# Patient Record
Sex: FEMALE | Race: OTHER | Employment: UNEMPLOYED | URBAN - METROPOLITAN AREA
[De-identification: names, ages, dates, MRNs, and addresses within clinical notes are randomized per-mention and may not be internally consistent; named-entity substitution may affect disease eponyms.]

---

## 2017-05-13 ENCOUNTER — HOSPITAL ENCOUNTER (EMERGENCY)
Age: 57
Discharge: HOME | End: 2017-05-13
Attending: STUDENT IN AN ORGANIZED HEALTH CARE EDUCATION/TRAINING PROGRAM
Payer: COMMERCIAL

## 2017-05-13 ENCOUNTER — APPOINTMENT (OUTPATIENT)
Dept: GENERAL RADIOLOGY | Age: 57
End: 2017-05-13
Payer: COMMERCIAL

## 2017-05-13 VITALS
RESPIRATION RATE: 20 BRPM | DIASTOLIC BLOOD PRESSURE: 75 MMHG | HEIGHT: 64 IN | WEIGHT: 125 LBS | TEMPERATURE: 98.4 F | HEART RATE: 87 BPM | BODY MASS INDEX: 21.34 KG/M2 | OXYGEN SATURATION: 98 % | SYSTOLIC BLOOD PRESSURE: 141 MMHG

## 2017-05-13 DIAGNOSIS — L50.8 CHRONIC URTICARIA: Primary | ICD-10-CM

## 2017-05-13 DIAGNOSIS — M25.461 KNEE EFFUSION, RIGHT: ICD-10-CM

## 2017-05-13 LAB
ALBUMIN SERPL-MCNC: 4.2 G/DL (ref 3.9–4.9)
ALP BLD-CCNC: 108 U/L (ref 40–130)
ALT SERPL-CCNC: 10 U/L (ref 0–33)
ANION GAP SERPL CALCULATED.3IONS-SCNC: 11 MEQ/L (ref 7–13)
AST SERPL-CCNC: 12 U/L (ref 0–35)
BASOPHILS ABSOLUTE: 0.1 K/UL (ref 0–0.2)
BASOPHILS RELATIVE PERCENT: 0.9 %
BILIRUB SERPL-MCNC: 0.1 MG/DL (ref 0–1.2)
BILIRUBIN URINE: NEGATIVE
BLOOD, URINE: NEGATIVE
BUN BLDV-MCNC: 14 MG/DL (ref 6–20)
C-REACTIVE PROTEIN, HIGH SENSITIVITY: 5.1 MG/L (ref 0–5)
CALCIUM SERPL-MCNC: 9.1 MG/DL (ref 8.6–10.2)
CHLORIDE BLD-SCNC: 102 MEQ/L (ref 98–107)
CLARITY: CLEAR
CO2: 25 MEQ/L (ref 22–29)
COLOR: YELLOW
CREAT SERPL-MCNC: 0.57 MG/DL (ref 0.5–0.9)
EOSINOPHILS ABSOLUTE: 0.1 K/UL (ref 0–0.7)
EOSINOPHILS RELATIVE PERCENT: 1.5 %
GFR AFRICAN AMERICAN: >60
GFR NON-AFRICAN AMERICAN: >60
GLOBULIN: 2.4 G/DL (ref 2.3–3.5)
GLUCOSE BLD-MCNC: 98 MG/DL (ref 74–109)
GLUCOSE URINE: NEGATIVE MG/DL
HCT VFR BLD CALC: 41.7 % (ref 37–47)
HEMOGLOBIN: 14.1 G/DL (ref 12–16)
KETONES, URINE: NEGATIVE MG/DL
LACTIC ACID: 0.6 MMOL/L (ref 0.5–2.2)
LEUKOCYTE ESTERASE, URINE: NEGATIVE
LYMPHOCYTES ABSOLUTE: 2.3 K/UL (ref 1–4.8)
LYMPHOCYTES RELATIVE PERCENT: 26.7 %
MCH RBC QN AUTO: 30.2 PG (ref 27–31.3)
MCHC RBC AUTO-ENTMCNC: 33.9 % (ref 33–37)
MCV RBC AUTO: 89.2 FL (ref 82–100)
MONOCYTES ABSOLUTE: 0.4 K/UL (ref 0.2–0.8)
MONOCYTES RELATIVE PERCENT: 4 %
NEUTROPHILS ABSOLUTE: 5.8 K/UL (ref 1.4–6.5)
NEUTROPHILS RELATIVE PERCENT: 66.9 %
NITRITE, URINE: NEGATIVE
PDW BLD-RTO: 13.2 % (ref 11.5–14.5)
PH UA: 7 (ref 5–9)
PLATELET # BLD: 303 K/UL (ref 130–400)
POTASSIUM SERPL-SCNC: 3.7 MEQ/L (ref 3.5–5.1)
PROTEIN UA: NEGATIVE MG/DL
RBC # BLD: 4.67 M/UL (ref 4.2–5.4)
SEDIMENTATION RATE, ERYTHROCYTE: 12 MM (ref 0–30)
SODIUM BLD-SCNC: 138 MEQ/L (ref 132–144)
SPECIFIC GRAVITY UA: 1.01 (ref 1–1.03)
TOTAL PROTEIN: 6.6 G/DL (ref 6.4–8.1)
URINE REFLEX TO CULTURE: NORMAL
UROBILINOGEN, URINE: 0.2 E.U./DL
WBC # BLD: 8.7 K/UL (ref 4.8–10.8)

## 2017-05-13 PROCEDURE — 81003 URINALYSIS AUTO W/O SCOPE: CPT

## 2017-05-13 PROCEDURE — 96375 TX/PRO/DX INJ NEW DRUG ADDON: CPT

## 2017-05-13 PROCEDURE — 83605 ASSAY OF LACTIC ACID: CPT

## 2017-05-13 PROCEDURE — 71020 XR CHEST STANDARD TWO VW: CPT

## 2017-05-13 PROCEDURE — 2500000003 HC RX 250 WO HCPCS: Performed by: STUDENT IN AN ORGANIZED HEALTH CARE EDUCATION/TRAINING PROGRAM

## 2017-05-13 PROCEDURE — 6370000000 HC RX 637 (ALT 250 FOR IP): Performed by: STUDENT IN AN ORGANIZED HEALTH CARE EDUCATION/TRAINING PROGRAM

## 2017-05-13 PROCEDURE — 73562 X-RAY EXAM OF KNEE 3: CPT

## 2017-05-13 PROCEDURE — 6360000002 HC RX W HCPCS: Performed by: STUDENT IN AN ORGANIZED HEALTH CARE EDUCATION/TRAINING PROGRAM

## 2017-05-13 PROCEDURE — 86141 C-REACTIVE PROTEIN HS: CPT

## 2017-05-13 PROCEDURE — S0028 INJECTION, FAMOTIDINE, 20 MG: HCPCS | Performed by: STUDENT IN AN ORGANIZED HEALTH CARE EDUCATION/TRAINING PROGRAM

## 2017-05-13 PROCEDURE — 80053 COMPREHEN METABOLIC PANEL: CPT

## 2017-05-13 PROCEDURE — 85652 RBC SED RATE AUTOMATED: CPT

## 2017-05-13 PROCEDURE — 99283 EMERGENCY DEPT VISIT LOW MDM: CPT

## 2017-05-13 PROCEDURE — 96374 THER/PROPH/DIAG INJ IV PUSH: CPT

## 2017-05-13 PROCEDURE — 36415 COLL VENOUS BLD VENIPUNCTURE: CPT

## 2017-05-13 PROCEDURE — 85025 COMPLETE CBC W/AUTO DIFF WBC: CPT

## 2017-05-13 RX ORDER — PREDNISONE 10 MG/1
50 TABLET ORAL DAILY
Qty: 25 TABLET | Refills: 0 | Status: SHIPPED | OUTPATIENT
Start: 2017-05-13 | End: 2017-05-18

## 2017-05-13 RX ORDER — METHYLPREDNISOLONE SODIUM SUCCINATE 125 MG/2ML
125 INJECTION, POWDER, LYOPHILIZED, FOR SOLUTION INTRAMUSCULAR; INTRAVENOUS ONCE
Status: COMPLETED | OUTPATIENT
Start: 2017-05-13 | End: 2017-05-13

## 2017-05-13 RX ORDER — OXYCODONE HYDROCHLORIDE AND ACETAMINOPHEN 5; 325 MG/1; MG/1
1 TABLET ORAL EVERY 6 HOURS PRN
Qty: 20 TABLET | Refills: 0 | Status: SHIPPED | OUTPATIENT
Start: 2017-05-13 | End: 2017-12-07 | Stop reason: ALTCHOICE

## 2017-05-13 RX ORDER — HYDROCODONE BITARTRATE AND ACETAMINOPHEN 5; 325 MG/1; MG/1
1 TABLET ORAL ONCE
Status: COMPLETED | OUTPATIENT
Start: 2017-05-13 | End: 2017-05-13

## 2017-05-13 RX ORDER — FAMOTIDINE 20 MG/1
20 TABLET, FILM COATED ORAL 2 TIMES DAILY
Qty: 20 TABLET | Refills: 0 | Status: SHIPPED | OUTPATIENT
Start: 2017-05-13 | End: 2021-03-02 | Stop reason: ALTCHOICE

## 2017-05-13 RX ADMIN — HYDROCODONE BITARTRATE AND ACETAMINOPHEN 1 TABLET: 5; 325 TABLET ORAL at 19:49

## 2017-05-13 RX ADMIN — METHYLPREDNISOLONE SODIUM SUCCINATE 125 MG: 125 INJECTION, POWDER, FOR SOLUTION INTRAMUSCULAR; INTRAVENOUS at 19:49

## 2017-05-13 RX ADMIN — FAMOTIDINE 20 MG: 10 INJECTION INTRAVENOUS at 19:49

## 2017-05-13 ASSESSMENT — PAIN SCALES - GENERAL
PAINLEVEL_OUTOF10: 8

## 2017-05-13 ASSESSMENT — ENCOUNTER SYMPTOMS
COUGH: 0
SINUS PRESSURE: 0
CHEST TIGHTNESS: 0
TROUBLE SWALLOWING: 0
DIARRHEA: 0
VOMITING: 0
BACK PAIN: 0
SHORTNESS OF BREATH: 0
ABDOMINAL PAIN: 0

## 2017-05-13 ASSESSMENT — PAIN DESCRIPTION - DESCRIPTORS: DESCRIPTORS: ACHING

## 2017-05-13 ASSESSMENT — PAIN DESCRIPTION - ORIENTATION
ORIENTATION: RIGHT
ORIENTATION: RIGHT;LEFT

## 2017-05-13 ASSESSMENT — PAIN DESCRIPTION - LOCATION
LOCATION: KNEE
LOCATION: KNEE

## 2017-05-13 ASSESSMENT — PAIN DESCRIPTION - PAIN TYPE: TYPE: ACUTE PAIN

## 2017-06-08 ENCOUNTER — HOSPITAL ENCOUNTER (EMERGENCY)
Age: 57
Discharge: HOME | End: 2017-06-08
Payer: COMMERCIAL

## 2017-06-08 VITALS
HEIGHT: 63 IN | TEMPERATURE: 98.3 F | BODY MASS INDEX: 22.32 KG/M2 | RESPIRATION RATE: 20 BRPM | DIASTOLIC BLOOD PRESSURE: 78 MMHG | WEIGHT: 126 LBS | HEART RATE: 98 BPM | OXYGEN SATURATION: 96 % | SYSTOLIC BLOOD PRESSURE: 152 MMHG

## 2017-06-08 DIAGNOSIS — L50.9 FULL BODY HIVES: Primary | ICD-10-CM

## 2017-06-08 PROCEDURE — 99282 EMERGENCY DEPT VISIT SF MDM: CPT

## 2017-06-08 RX ORDER — HYDROCODONE BITARTRATE AND ACETAMINOPHEN 5; 325 MG/1; MG/1
1-2 TABLET ORAL EVERY 6 HOURS PRN
Qty: 14 TABLET | Refills: 0 | Status: SHIPPED | OUTPATIENT
Start: 2017-06-08 | End: 2017-06-15

## 2017-06-08 RX ORDER — METHYLPREDNISOLONE 4 MG/1
TABLET ORAL
Qty: 1 KIT | Refills: 0 | Status: SHIPPED | OUTPATIENT
Start: 2017-06-08 | End: 2017-06-14

## 2017-06-08 RX ORDER — LORAZEPAM 0.5 MG/1
0.5 TABLET ORAL EVERY 6 HOURS PRN
Qty: 16 TABLET | Refills: 0 | Status: SHIPPED | OUTPATIENT
Start: 2017-06-08 | End: 2021-03-02 | Stop reason: ALTCHOICE

## 2017-06-08 ASSESSMENT — ENCOUNTER SYMPTOMS
VOMITING: 0
COUGH: 0
DIARRHEA: 0
ABDOMINAL PAIN: 0
NAUSEA: 0
SHORTNESS OF BREATH: 0

## 2017-06-08 ASSESSMENT — PAIN SCALES - GENERAL: PAINLEVEL_OUTOF10: 6

## 2017-06-08 ASSESSMENT — PAIN DESCRIPTION - LOCATION: LOCATION: GENERALIZED

## 2017-12-07 ENCOUNTER — APPOINTMENT (OUTPATIENT)
Dept: CT IMAGING | Age: 57
End: 2017-12-07
Payer: COMMERCIAL

## 2017-12-07 ENCOUNTER — HOSPITAL ENCOUNTER (EMERGENCY)
Age: 57
Discharge: HOME | End: 2017-12-07
Attending: EMERGENCY MEDICINE
Payer: COMMERCIAL

## 2017-12-07 VITALS
HEIGHT: 63 IN | DIASTOLIC BLOOD PRESSURE: 77 MMHG | OXYGEN SATURATION: 95 % | TEMPERATURE: 97.5 F | RESPIRATION RATE: 20 BRPM | BODY MASS INDEX: 22.5 KG/M2 | WEIGHT: 127 LBS | HEART RATE: 72 BPM | SYSTOLIC BLOOD PRESSURE: 124 MMHG

## 2017-12-07 DIAGNOSIS — M54.31 SCIATICA OF RIGHT SIDE: Primary | ICD-10-CM

## 2017-12-07 PROCEDURE — 6360000002 HC RX W HCPCS: Performed by: EMERGENCY MEDICINE

## 2017-12-07 PROCEDURE — 6370000000 HC RX 637 (ALT 250 FOR IP): Performed by: EMERGENCY MEDICINE

## 2017-12-07 PROCEDURE — 96374 THER/PROPH/DIAG INJ IV PUSH: CPT

## 2017-12-07 PROCEDURE — 99283 EMERGENCY DEPT VISIT LOW MDM: CPT

## 2017-12-07 PROCEDURE — 72131 CT LUMBAR SPINE W/O DYE: CPT

## 2017-12-07 RX ORDER — ACETAMINOPHEN 500 MG
1000 TABLET ORAL ONCE
Status: COMPLETED | OUTPATIENT
Start: 2017-12-07 | End: 2017-12-07

## 2017-12-07 RX ORDER — DIAZEPAM 5 MG/1
5 TABLET ORAL ONCE
Status: DISCONTINUED | OUTPATIENT
Start: 2017-12-07 | End: 2017-12-07

## 2017-12-07 RX ORDER — ONDANSETRON 4 MG/1
4 TABLET, ORALLY DISINTEGRATING ORAL ONCE
Status: DISCONTINUED | OUTPATIENT
Start: 2017-12-07 | End: 2017-12-07

## 2017-12-07 RX ORDER — KETOROLAC TROMETHAMINE 30 MG/ML
30 INJECTION, SOLUTION INTRAMUSCULAR; INTRAVENOUS ONCE
Status: COMPLETED | OUTPATIENT
Start: 2017-12-07 | End: 2017-12-07

## 2017-12-07 RX ORDER — OXYCODONE HYDROCHLORIDE AND ACETAMINOPHEN 5; 325 MG/1; MG/1
1 TABLET ORAL EVERY 4 HOURS PRN
Qty: 15 TABLET | Refills: 0 | Status: SHIPPED | OUTPATIENT
Start: 2017-12-07 | End: 2017-12-10

## 2017-12-07 RX ORDER — HYDROMORPHONE HCL 110MG/55ML
1 PATIENT CONTROLLED ANALGESIA SYRINGE INTRAVENOUS ONCE
Status: DISCONTINUED | OUTPATIENT
Start: 2017-12-07 | End: 2017-12-07

## 2017-12-07 RX ORDER — CYCLOBENZAPRINE HCL 10 MG
10 TABLET ORAL 3 TIMES DAILY PRN
Qty: 15 TABLET | Refills: 0 | Status: SHIPPED | OUTPATIENT
Start: 2017-12-07 | End: 2017-12-10

## 2017-12-07 RX ADMIN — KETOROLAC TROMETHAMINE 30 MG: 30 INJECTION, SOLUTION INTRAMUSCULAR at 15:22

## 2017-12-07 RX ADMIN — ACETAMINOPHEN 1000 MG: 500 TABLET ORAL at 15:23

## 2017-12-07 ASSESSMENT — ENCOUNTER SYMPTOMS
NAUSEA: 0
VOMITING: 0
SHORTNESS OF BREATH: 0
DIARRHEA: 0
COUGH: 0
SORE THROAT: 0
ABDOMINAL PAIN: 0
BACK PAIN: 1

## 2017-12-07 ASSESSMENT — PAIN SCALES - GENERAL
PAINLEVEL_OUTOF10: 6
PAINLEVEL_OUTOF10: 10
PAINLEVEL_OUTOF10: 7

## 2017-12-07 ASSESSMENT — PAIN DESCRIPTION - LOCATION: LOCATION: BACK;LEG

## 2017-12-07 ASSESSMENT — PAIN DESCRIPTION - ORIENTATION: ORIENTATION: RIGHT

## 2017-12-07 NOTE — ED PROVIDER NOTES
[address] [facility] ED  eMERGENCY dEPARTMENT eNCOUnter      Pt Name: Pérez Campa  MRN: [id number]  [place] 1960  Date of evaluation: 12/7/2017  Provider: Constance Vega MD    CHIEF COMPLAINT           HPI  Pérez Campa is a 61 y.o. female per chart review has a h/o chronic low back pain, hpl presents to the ED with back pain. Pt notes severe, constant, throbbing right lower back pain radiating down to right knee x 2 days. No incontinence, IV drug abuse. Pt has had sciatica on the L side and had spine surgery 2 years ago. ROS  Review of Systems   Constitutional: Negative for activity change, chills and fever. HENT: Negative for ear pain and sore throat. Eyes: Negative for visual disturbance. Respiratory: Negative for cough and shortness of breath. Cardiovascular: Negative for chest pain, palpitations and leg swelling. Gastrointestinal: Negative for abdominal pain, diarrhea, nausea and vomiting. Genitourinary: Negative for dysuria. Musculoskeletal: Positive for back pain. Skin: Negative for rash. Neurological: Negative for dizziness and weakness. Except as noted above the remainder of the review of systems was reviewed and negative. PAST MEDICAL HISTORY     Past Medical History:   Diagnosis Date    Cystocele     Diverticulosis     Hypercholesteremia     Rotator cuff tear     Urinary incontinence     Uterine prolapse          SURGICAL HISTORY       Past Surgical History:   Procedure Laterality Date    DILATION AND CURETTAGE OF UTERUS      HYSTEROSCOPY           CURRENT MEDICATIONS       Previous Medications    CETIRIZINE (ZYRTEC) 10 MG TABLET    Take 10 mg by mouth daily. CETIRIZINE (ZYRTEC) 10 MG TABLET    Take 1 tablet by mouth    DARIFENACIN (ENABLEX) 15 MG SR TABLET    Take 15 mg by mouth daily.       FAMOTIDINE (PEPCID) 20 MG TABLET    Take 1 tablet by mouth 2 times daily    HYDROXYZINE (ATARAX) 25 MG TABLET    Take 25 mg by mouth    HYDROXYZINE (ATARAX) 25 MG TABLET        IBUPROFEN (ADVIL;MOTRIN) 600 MG TABLET        LORAZEPAM (ATIVAN) 0.5 MG TABLET    Take 1 tablet by mouth every 6 hours as needed for Anxiety    PREGABALIN (LYRICA) 75 MG CAPSULE    Take 1 capsule by mouth 3 times daily    SIMVASTATIN (ZOCOR) 10 MG TABLET    Take 10 mg by mouth nightly. ALLERGIES     Ciprofloxacin; Lipitor; and Penicillins    FAMILY HISTORY       Family History   Problem Relation Age of Onset    Stroke Mother           SOCIAL HISTORY       Social History     Social History    Marital status: Single     Spouse name: N/A    Number of children: N/A    Years of education: N/A     Social History Main Topics    Smoking status: Current Every Day Smoker     Packs/day: 0.50     Types: Cigarettes    Smokeless tobacco: Never Used    Alcohol use No    Drug use: Unknown    Sexual activity: Not Currently     Other Topics Concern    None     Social History Narrative    None         PHYSICAL EXAM       ED Triage Vitals [12/07/17 1446]   BP Temp Temp Source Pulse Resp SpO2 Height Weight   124/77 97.5 °F (36.4 °C) Oral 72 20 95 % 5' 3\" (1.6 m) 127 lb (57.6 kg)       Physical Exam   Constitutional: She is oriented to person, place, and time. She appears well-developed. HENT:   Head: Normocephalic. Right Ear: External ear normal.   Left Ear: External ear normal.   Mouth/Throat: Oropharynx is clear and moist.   Eyes: Conjunctivae are normal. Pupils are equal, round, and reactive to light. Neck: Normal range of motion. Neck supple. Cardiovascular: Normal rate, regular rhythm and normal heart sounds. Pulmonary/Chest: Effort normal and breath sounds normal.   Abdominal: Soft. Bowel sounds are normal. She exhibits no distension. There is no tenderness. Musculoskeletal: Normal range of motion. Neurological: She is alert and oriented to person, place, and time.   + R Straight leg raise   Skin: Skin is warm and dry. Psychiatric: She has a normal mood and affect. Nursing note and vitals reviewed. MDM  60 yo female presents to the ED with acute on chronic back pain. Pt given IM toradol, PO tylenol in the ED. Pt requesting CT scan of the back. CT lumbar shows DJD. Pt reassessed and feels better. No signs of cord compression. Pt given prescription for percocet, flexeril and will f/u with pcp on Monday. Pt is scheduled for an MRI. Pt understands plan. FINAL IMPRESSION      1. Sciatica of right side          DISPOSITION/PLAN   DISPOSITION Decision to Discharge      DISCHARGE MEDICATIONS:  New Prescriptions    CYCLOBENZAPRINE (FLEXERIL) 10 MG TABLET    Take 1 tablet by mouth 3 times daily as needed for Muscle spasms    OXYCODONE-ACETAMINOPHEN (PERCOCET) 5-325 MG PER TABLET    Take 1 tablet by mouth every 4 hours as needed for Pain .             Esther Macdnoald MD (electronically signed)  Attending Emergency Physician           Esther Macdonald MD  12/07/17 7879

## 2017-12-07 NOTE — ED TRIAGE NOTES
Pt c/o chronic back pain that has increased since the end of last month denies any new trauma, pt is a&ox3, calm, ambulatory. Skin wnl, sensation and movement intact, pulses palpable.

## 2018-07-30 ENCOUNTER — APPOINTMENT (OUTPATIENT)
Dept: CT IMAGING | Age: 58
End: 2018-07-30
Payer: COMMERCIAL

## 2018-07-30 ENCOUNTER — HOSPITAL ENCOUNTER (EMERGENCY)
Age: 58
Discharge: HOME | End: 2018-07-30
Payer: COMMERCIAL

## 2018-07-30 VITALS
WEIGHT: 130 LBS | BODY MASS INDEX: 23.04 KG/M2 | OXYGEN SATURATION: 97 % | RESPIRATION RATE: 18 BRPM | SYSTOLIC BLOOD PRESSURE: 100 MMHG | HEART RATE: 74 BPM | TEMPERATURE: 97.9 F | HEIGHT: 63 IN | DIASTOLIC BLOOD PRESSURE: 61 MMHG

## 2018-07-30 DIAGNOSIS — K57.32 DIVERTICULITIS OF COLON: Primary | ICD-10-CM

## 2018-07-30 LAB
ALBUMIN SERPL-MCNC: 4.2 G/DL (ref 3.9–4.9)
ALP BLD-CCNC: 96 U/L (ref 40–130)
ALT SERPL-CCNC: 14 U/L (ref 0–33)
ANION GAP SERPL CALCULATED.3IONS-SCNC: 12 MEQ/L (ref 7–13)
AST SERPL-CCNC: 15 U/L (ref 0–35)
BASOPHILS ABSOLUTE: 0.1 K/UL (ref 0–0.2)
BASOPHILS RELATIVE PERCENT: 1 %
BILIRUB SERPL-MCNC: 0.4 MG/DL (ref 0–1.2)
BILIRUBIN URINE: NEGATIVE
BLOOD, URINE: NEGATIVE
BUN BLDV-MCNC: 6 MG/DL (ref 6–20)
CALCIUM SERPL-MCNC: 9.2 MG/DL (ref 8.6–10.2)
CHLORIDE BLD-SCNC: 107 MEQ/L (ref 98–107)
CLARITY: CLEAR
CO2: 22 MEQ/L (ref 22–29)
COLOR: YELLOW
CREAT SERPL-MCNC: 0.36 MG/DL (ref 0.5–0.9)
EOSINOPHILS ABSOLUTE: 0.1 K/UL (ref 0–0.7)
EOSINOPHILS RELATIVE PERCENT: 1 %
GFR AFRICAN AMERICAN: >60
GFR NON-AFRICAN AMERICAN: >60
GLOBULIN: 3 G/DL (ref 2.3–3.5)
GLUCOSE BLD-MCNC: 83 MG/DL (ref 74–109)
GLUCOSE URINE: NEGATIVE MG/DL
HCT VFR BLD CALC: 40.2 % (ref 37–47)
HEMOGLOBIN: 13.9 G/DL (ref 12–16)
KETONES, URINE: NEGATIVE MG/DL
LEUKOCYTE ESTERASE, URINE: NEGATIVE
LYMPHOCYTES ABSOLUTE: 1.6 K/UL (ref 1–4.8)
LYMPHOCYTES RELATIVE PERCENT: 22.6 %
MCH RBC QN AUTO: 31.7 PG (ref 27–31.3)
MCHC RBC AUTO-ENTMCNC: 34.7 % (ref 33–37)
MCV RBC AUTO: 91.5 FL (ref 82–100)
MONOCYTES ABSOLUTE: 0.5 K/UL (ref 0.2–0.8)
MONOCYTES RELATIVE PERCENT: 7.5 %
NEUTROPHILS ABSOLUTE: 4.8 K/UL (ref 1.4–6.5)
NEUTROPHILS RELATIVE PERCENT: 67.9 %
NITRITE, URINE: NEGATIVE
PDW BLD-RTO: 13.2 % (ref 11.5–14.5)
PH UA: 6.5 (ref 5–9)
PLATELET # BLD: 208 K/UL (ref 130–400)
POTASSIUM SERPL-SCNC: 3.6 MEQ/L (ref 3.5–5.1)
PROTEIN UA: NEGATIVE MG/DL
RBC # BLD: 4.4 M/UL (ref 4.2–5.4)
SODIUM BLD-SCNC: 141 MEQ/L (ref 132–144)
SPECIFIC GRAVITY UA: 1 (ref 1–1.03)
TOTAL PROTEIN: 7.2 G/DL (ref 6.4–8.1)
URINE REFLEX TO CULTURE: NORMAL
UROBILINOGEN, URINE: 0.2 E.U./DL
WBC # BLD: 7.1 K/UL (ref 4.8–10.8)

## 2018-07-30 PROCEDURE — 74177 CT ABD & PELVIS W/CONTRAST: CPT

## 2018-07-30 PROCEDURE — 6370000000 HC RX 637 (ALT 250 FOR IP): Performed by: NURSE PRACTITIONER

## 2018-07-30 PROCEDURE — 2580000003 HC RX 258: Performed by: NURSE PRACTITIONER

## 2018-07-30 PROCEDURE — 36415 COLL VENOUS BLD VENIPUNCTURE: CPT

## 2018-07-30 PROCEDURE — 80053 COMPREHEN METABOLIC PANEL: CPT

## 2018-07-30 PROCEDURE — 6360000002 HC RX W HCPCS: Performed by: NURSE PRACTITIONER

## 2018-07-30 PROCEDURE — 81003 URINALYSIS AUTO W/O SCOPE: CPT

## 2018-07-30 PROCEDURE — 6360000004 HC RX CONTRAST MEDICATION: Performed by: NURSE PRACTITIONER

## 2018-07-30 PROCEDURE — 96374 THER/PROPH/DIAG INJ IV PUSH: CPT

## 2018-07-30 PROCEDURE — 85025 COMPLETE CBC W/AUTO DIFF WBC: CPT

## 2018-07-30 PROCEDURE — 99284 EMERGENCY DEPT VISIT MOD MDM: CPT

## 2018-07-30 RX ORDER — KETOROLAC TROMETHAMINE 30 MG/ML
30 INJECTION, SOLUTION INTRAMUSCULAR; INTRAVENOUS ONCE
Status: DISCONTINUED | OUTPATIENT
Start: 2018-07-30 | End: 2018-07-30

## 2018-07-30 RX ORDER — SULFAMETHOXAZOLE AND TRIMETHOPRIM 800; 160 MG/1; MG/1
1 TABLET ORAL 2 TIMES DAILY
Qty: 28 TABLET | Refills: 0 | Status: SHIPPED | OUTPATIENT
Start: 2018-07-30 | End: 2018-08-13

## 2018-07-30 RX ORDER — SULFAMETHOXAZOLE AND TRIMETHOPRIM 800; 160 MG/1; MG/1
1 TABLET ORAL ONCE
Status: COMPLETED | OUTPATIENT
Start: 2018-07-30 | End: 2018-07-30

## 2018-07-30 RX ORDER — METRONIDAZOLE 250 MG/1
500 TABLET ORAL ONCE
Status: COMPLETED | OUTPATIENT
Start: 2018-07-30 | End: 2018-07-30

## 2018-07-30 RX ORDER — ONDANSETRON 2 MG/ML
4 INJECTION INTRAMUSCULAR; INTRAVENOUS ONCE
Status: COMPLETED | OUTPATIENT
Start: 2018-07-30 | End: 2018-07-30

## 2018-07-30 RX ORDER — SODIUM CHLORIDE 0.9 % (FLUSH) 0.9 %
10 SYRINGE (ML) INJECTION ONCE
Status: COMPLETED | OUTPATIENT
Start: 2018-07-30 | End: 2018-07-30

## 2018-07-30 RX ORDER — 0.9 % SODIUM CHLORIDE 0.9 %
1000 INTRAVENOUS SOLUTION INTRAVENOUS ONCE
Status: COMPLETED | OUTPATIENT
Start: 2018-07-30 | End: 2018-07-30

## 2018-07-30 RX ORDER — DICYCLOMINE HYDROCHLORIDE 10 MG/1
10 CAPSULE ORAL
Qty: 30 CAPSULE | Refills: 0 | Status: SHIPPED | OUTPATIENT
Start: 2018-07-30 | End: 2021-03-02 | Stop reason: ALTCHOICE

## 2018-07-30 RX ORDER — DIAZEPAM 5 MG/1
5 TABLET ORAL EVERY 6 HOURS PRN
COMMUNITY
End: 2021-03-02 | Stop reason: ALTCHOICE

## 2018-07-30 RX ORDER — DICYCLOMINE HCL 20 MG
20 TABLET ORAL ONCE
Status: COMPLETED | OUTPATIENT
Start: 2018-07-30 | End: 2018-07-30

## 2018-07-30 RX ORDER — CYCLOBENZAPRINE HCL 10 MG
10 TABLET ORAL 3 TIMES DAILY PRN
COMMUNITY
End: 2021-04-07

## 2018-07-30 RX ORDER — METRONIDAZOLE 500 MG/1
500 TABLET ORAL 3 TIMES DAILY
Qty: 42 TABLET | Refills: 0 | Status: SHIPPED | OUTPATIENT
Start: 2018-07-30 | End: 2018-08-13

## 2018-07-30 RX ADMIN — ONDANSETRON HYDROCHLORIDE 4 MG: 2 INJECTION, SOLUTION INTRAMUSCULAR; INTRAVENOUS at 15:49

## 2018-07-30 RX ADMIN — SULFAMETHOXAZOLE AND TRIMETHOPRIM 1 TABLET: 800; 160 TABLET ORAL at 18:13

## 2018-07-30 RX ADMIN — SODIUM CHLORIDE 1000 ML: 9 INJECTION, SOLUTION INTRAVENOUS at 15:49

## 2018-07-30 RX ADMIN — IOPAMIDOL 100 ML: 755 INJECTION, SOLUTION INTRAVENOUS at 16:36

## 2018-07-30 RX ADMIN — Medication 10 ML: at 16:38

## 2018-07-30 RX ADMIN — METRONIDAZOLE 500 MG: 250 TABLET, FILM COATED ORAL at 18:13

## 2018-07-30 RX ADMIN — DICYCLOMINE HYDROCHLORIDE 20 MG: 20 TABLET ORAL at 18:18

## 2018-07-30 ASSESSMENT — PAIN DESCRIPTION - LOCATION: LOCATION: ABDOMEN

## 2018-07-30 ASSESSMENT — PAIN SCALES - GENERAL
PAINLEVEL_OUTOF10: 9
PAINLEVEL_OUTOF10: 9

## 2018-07-30 ASSESSMENT — ENCOUNTER SYMPTOMS
ABDOMINAL DISTENTION: 0
BLOOD IN STOOL: 0
ABDOMINAL PAIN: 1
EYE PAIN: 0
EYE ITCHING: 0
CONSTIPATION: 1
DIARRHEA: 1
VOMITING: 0
SHORTNESS OF BREATH: 0
WHEEZING: 0
VOICE CHANGE: 0
COUGH: 0
COLOR CHANGE: 0
EYE REDNESS: 0
NAUSEA: 1
TROUBLE SWALLOWING: 0
BACK PAIN: 0
SORE THROAT: 0
RHINORRHEA: 0

## 2018-07-30 ASSESSMENT — PAIN DESCRIPTION - ORIENTATION: ORIENTATION: MID;LOWER

## 2018-07-30 ASSESSMENT — PAIN DESCRIPTION - FREQUENCY: FREQUENCY: CONTINUOUS

## 2018-07-30 ASSESSMENT — PAIN DESCRIPTION - ONSET: ONSET: ON-GOING

## 2018-07-30 ASSESSMENT — PAIN DESCRIPTION - DESCRIPTORS: DESCRIPTORS: CRAMPING;DULL

## 2018-07-30 ASSESSMENT — PAIN DESCRIPTION - PROGRESSION: CLINICAL_PROGRESSION: GRADUALLY IMPROVING

## 2018-07-30 ASSESSMENT — PAIN DESCRIPTION - PAIN TYPE: TYPE: ACUTE PAIN

## 2018-07-30 NOTE — ED PROVIDER NOTES
and vaginal discharge. Musculoskeletal: Negative for back pain. Skin: Negative for color change and rash. Neurological: Negative for dizziness, seizures, light-headedness, numbness and headaches. All other systems reviewed and are negative. Except as noted above the remainder of the review of systems was reviewed and negative. PAST MEDICAL HISTORY     Past Medical History:   Diagnosis Date    Cystocele     Diverticulosis     Hypercholesteremia     Rotator cuff tear     Urinary incontinence     Uterine prolapse      Past Surgical History:   Procedure Laterality Date    DILATION AND CURETTAGE OF UTERUS      HYSTERECTOMY      HYSTEROSCOPY       Social History     Social History    Marital status: Single     Spouse name: N/A    Number of children: N/A    Years of education: N/A     Social History Main Topics    Smoking status: Current Every Day Smoker     Packs/day: 0.50     Types: Cigarettes    Smokeless tobacco: Never Used    Alcohol use No    Drug use: No    Sexual activity: Not Currently     Other Topics Concern    None     Social History Narrative    None       SCREENINGS             PHYSICAL EXAM    (up to 7 for level 4, 8 or more for level 5)     ED Triage Vitals [07/30/18 1430]   BP Temp Temp Source Pulse Resp SpO2 Height Weight   131/73 97.9 °F (36.6 °C) Oral 72 16 99 % 5' 3\" (1.6 m) 130 lb (59 kg)       Physical Exam   Constitutional: She is oriented to person, place, and time. She appears well-developed and well-nourished. No distress. HENT:   Head: Normocephalic and atraumatic. Eyes: Conjunctivae and EOM are normal. Pupils are equal, round, and reactive to light. Neck: Normal range of motion. Neck supple. Cardiovascular: Normal rate, regular rhythm, normal heart sounds and intact distal pulses. Pulmonary/Chest: Effort normal and breath sounds normal. No respiratory distress. Abdominal: Soft. Bowel sounds are normal. She exhibits no distension.  There is no hepatosplenomegaly. There is tenderness. There is no rebound, no guarding and no CVA tenderness. LLQ>RLQ   Musculoskeletal: Normal range of motion. Neurological: She is alert and oriented to person, place, and time. Skin: Skin is warm and dry. She is not diaphoretic. No pallor. Psychiatric: She has a normal mood and affect. Her behavior is normal.   Nursing note and vitals reviewed. DIAGNOSTIC RESULTS     EKG: All EKG's are interpreted by the Emergency Department Physician who either signs or Co-signs this chart in the absence of a cardiologist.      RADIOLOGY:   Non-plain film images such as CT, Ultrasound and MRI are read by the radiologist. Plain radiographic images are visualized and preliminarily interpreted by the emergency physician with the below findings:        Interpretation per the Radiologist below, if available at the time of this note:    CT ABDOMEN PELVIS W IV CONTRAST Additional Contrast? None   Final Result      Acute sigmoid diverticulitis without evidence of colonic perforation or abscess formation. All CT scans at this facility use dose modulation, iterative reconstruction, and/or weight based dosing when appropriate to reduce radiation dose to as low as reasonably achievable. ED BEDSIDE ULTRASOUND:   Performed by ED Physician - none    LABS:  Labs Reviewed   COMPREHENSIVE METABOLIC PANEL - Abnormal; Notable for the following:        Result Value    CREATININE 0.36 (*)     All other components within normal limits   CBC WITH AUTO DIFFERENTIAL - Abnormal; Notable for the following:     MCH 31.7 (*)     All other components within normal limits   URINE RT REFLEX TO CULTURE   LACTIC ACID, PLASMA   LACTIC ACID, PLASMA       All other labs were within normal range or not returned as of this dictation.     EMERGENCY DEPARTMENT COURSE and DIFFERENTIAL DIAGNOSIS/MDM:   Vitals:    Vitals:    07/30/18 1430 07/30/18 1553 07/30/18 1758   BP: 131/73 94/66 100/61   Pulse: 72 64 edit the dictations but occasionally words are mis-transcribed.)    MARKO Palomo CNP (electronically signed)  Attending Emergency Physician        MARKO Palomo CNP  07/30/18 1800

## 2018-07-30 NOTE — ED NOTES
PA at bedside pt is becoming upset ,she states she may have allergy to Flagyl although it is not listed as a allergy. Patient has taken Cipro and Flagyl together and had a reaction,but has taken Flagyl alone in the past with no reaction.      Dalton Ohara, MARLINEN  23/13/70 5135

## 2018-07-30 NOTE — ED NOTES
No allergic reaction noted patient is getting dressed to go home.      Kendra Sepulveda LPN  25/58/75 0972

## 2018-12-21 ENCOUNTER — APPOINTMENT (OUTPATIENT)
Dept: GENERAL RADIOLOGY | Age: 58
End: 2018-12-21
Payer: COMMERCIAL

## 2018-12-21 ENCOUNTER — HOSPITAL ENCOUNTER (EMERGENCY)
Age: 58
Discharge: HOME | End: 2018-12-21
Payer: COMMERCIAL

## 2018-12-21 VITALS
OXYGEN SATURATION: 99 % | BODY MASS INDEX: 21.26 KG/M2 | RESPIRATION RATE: 19 BRPM | TEMPERATURE: 97.5 F | DIASTOLIC BLOOD PRESSURE: 72 MMHG | HEART RATE: 56 BPM | SYSTOLIC BLOOD PRESSURE: 100 MMHG | HEIGHT: 63 IN | WEIGHT: 120 LBS

## 2018-12-21 DIAGNOSIS — J40 BRONCHITIS: Primary | ICD-10-CM

## 2018-12-21 LAB
ALBUMIN SERPL-MCNC: 4.6 G/DL (ref 3.9–4.9)
ALP BLD-CCNC: 87 U/L (ref 40–130)
ALT SERPL-CCNC: 20 U/L (ref 0–33)
ANION GAP SERPL CALCULATED.3IONS-SCNC: 12 MEQ/L (ref 7–13)
APTT: 23.4 SEC (ref 21.6–35.4)
AST SERPL-CCNC: 18 U/L (ref 0–35)
BASOPHILS ABSOLUTE: 0.1 K/UL (ref 0–0.2)
BASOPHILS RELATIVE PERCENT: 0.9 %
BILIRUB SERPL-MCNC: 0.4 MG/DL (ref 0–1.2)
BUN BLDV-MCNC: 12 MG/DL (ref 6–20)
CALCIUM SERPL-MCNC: 9.3 MG/DL (ref 8.6–10.2)
CHLORIDE BLD-SCNC: 103 MEQ/L (ref 98–107)
CK MB: 9.5 NG/ML (ref 0–3.8)
CO2: 25 MEQ/L (ref 22–29)
CREAT SERPL-MCNC: 0.4 MG/DL (ref 0.5–0.9)
CREATINE KINASE-MB INDEX: 2.6 % (ref 0–3.5)
D DIMER: 0.33 MG/L FEU (ref 0–0.5)
EKG ATRIAL RATE: 51 BPM
EKG P AXIS: 59 DEGREES
EKG P-R INTERVAL: 142 MS
EKG Q-T INTERVAL: 436 MS
EKG QRS DURATION: 68 MS
EKG QTC CALCULATION (BAZETT): 401 MS
EKG R AXIS: 18 DEGREES
EKG T AXIS: 29 DEGREES
EKG VENTRICULAR RATE: 51 BPM
EOSINOPHILS ABSOLUTE: 0.1 K/UL (ref 0–0.7)
EOSINOPHILS RELATIVE PERCENT: 2 %
GFR AFRICAN AMERICAN: >60
GFR NON-AFRICAN AMERICAN: >60
GLOBULIN: 2.9 G/DL (ref 2.3–3.5)
GLUCOSE BLD-MCNC: 92 MG/DL (ref 74–109)
HCT VFR BLD CALC: 39.2 % (ref 37–47)
HEMOGLOBIN: 13.4 G/DL (ref 12–16)
INR BLD: 1.1
LYMPHOCYTES ABSOLUTE: 1.5 K/UL (ref 1–4.8)
LYMPHOCYTES RELATIVE PERCENT: 21.8 %
MCH RBC QN AUTO: 30.9 PG (ref 27–31.3)
MCHC RBC AUTO-ENTMCNC: 34.2 % (ref 33–37)
MCV RBC AUTO: 90.4 FL (ref 82–100)
MONOCYTES ABSOLUTE: 0.6 K/UL (ref 0.2–0.8)
MONOCYTES RELATIVE PERCENT: 8.8 %
NEUTROPHILS ABSOLUTE: 4.7 K/UL (ref 1.4–6.5)
NEUTROPHILS RELATIVE PERCENT: 66.5 %
PDW BLD-RTO: 13.4 % (ref 11.5–14.5)
PLATELET # BLD: 247 K/UL (ref 130–400)
POTASSIUM SERPL-SCNC: 3.8 MEQ/L (ref 3.5–5.1)
PROTHROMBIN TIME: 10.8 SEC (ref 9–11.5)
RBC # BLD: 4.33 M/UL (ref 4.2–5.4)
SODIUM BLD-SCNC: 140 MEQ/L (ref 132–144)
TOTAL CK: 361 U/L (ref 0–170)
TOTAL PROTEIN: 7.5 G/DL (ref 6.4–8.1)
TROPONIN: <0.01 NG/ML (ref 0–0.01)
WBC # BLD: 7 K/UL (ref 4.8–10.8)

## 2018-12-21 PROCEDURE — 96374 THER/PROPH/DIAG INJ IV PUSH: CPT

## 2018-12-21 PROCEDURE — 36415 COLL VENOUS BLD VENIPUNCTURE: CPT

## 2018-12-21 PROCEDURE — 93005 ELECTROCARDIOGRAM TRACING: CPT

## 2018-12-21 PROCEDURE — 85610 PROTHROMBIN TIME: CPT

## 2018-12-21 PROCEDURE — 85379 FIBRIN DEGRADATION QUANT: CPT

## 2018-12-21 PROCEDURE — 71046 X-RAY EXAM CHEST 2 VIEWS: CPT

## 2018-12-21 PROCEDURE — 84484 ASSAY OF TROPONIN QUANT: CPT

## 2018-12-21 PROCEDURE — 80053 COMPREHEN METABOLIC PANEL: CPT

## 2018-12-21 PROCEDURE — 99285 EMERGENCY DEPT VISIT HI MDM: CPT

## 2018-12-21 PROCEDURE — 85025 COMPLETE CBC W/AUTO DIFF WBC: CPT

## 2018-12-21 PROCEDURE — 85730 THROMBOPLASTIN TIME PARTIAL: CPT

## 2018-12-21 PROCEDURE — 6360000002 HC RX W HCPCS: Performed by: PHYSICIAN ASSISTANT

## 2018-12-21 PROCEDURE — 82553 CREATINE MB FRACTION: CPT

## 2018-12-21 PROCEDURE — 82550 ASSAY OF CK (CPK): CPT

## 2018-12-21 RX ORDER — AZITHROMYCIN 250 MG/1
TABLET, FILM COATED ORAL
Qty: 1 PACKET | Refills: 0 | Status: SHIPPED | OUTPATIENT
Start: 2018-12-21 | End: 2018-12-22

## 2018-12-21 RX ORDER — TRAMADOL HYDROCHLORIDE 50 MG/1
50 TABLET ORAL EVERY 6 HOURS PRN
Qty: 8 TABLET | Refills: 0 | Status: SHIPPED | OUTPATIENT
Start: 2018-12-21 | End: 2018-12-23

## 2018-12-21 RX ORDER — KETOROLAC TROMETHAMINE 15 MG/ML
15 INJECTION, SOLUTION INTRAMUSCULAR; INTRAVENOUS ONCE
Status: COMPLETED | OUTPATIENT
Start: 2018-12-21 | End: 2018-12-21

## 2018-12-21 RX ORDER — BENZONATATE 100 MG/1
100 CAPSULE ORAL 3 TIMES DAILY PRN
Qty: 21 CAPSULE | Refills: 0 | Status: SHIPPED | OUTPATIENT
Start: 2018-12-21 | End: 2018-12-28

## 2018-12-21 RX ADMIN — KETOROLAC TROMETHAMINE 15 MG: 15 INJECTION INTRAMUSCULAR; INTRAVENOUS at 14:25

## 2018-12-21 ASSESSMENT — ENCOUNTER SYMPTOMS
RHINORRHEA: 0
COLOR CHANGE: 0
SHORTNESS OF BREATH: 0
SORE THROAT: 0
COUGH: 1
CONSTIPATION: 0
ABDOMINAL DISTENTION: 0
ABDOMINAL PAIN: 0
EYE DISCHARGE: 0

## 2018-12-21 ASSESSMENT — PAIN DESCRIPTION - FREQUENCY: FREQUENCY: INTERMITTENT

## 2018-12-21 ASSESSMENT — PAIN DESCRIPTION - PAIN TYPE: TYPE: ACUTE PAIN

## 2018-12-21 ASSESSMENT — PAIN DESCRIPTION - ONSET: ONSET: ON-GOING

## 2018-12-21 ASSESSMENT — PAIN DESCRIPTION - LOCATION: LOCATION: CHEST

## 2018-12-21 ASSESSMENT — PAIN DESCRIPTION - PROGRESSION: CLINICAL_PROGRESSION: NOT CHANGED

## 2018-12-21 ASSESSMENT — PAIN SCALES - GENERAL
PAINLEVEL_OUTOF10: 10
PAINLEVEL_OUTOF10: 10

## 2018-12-21 ASSESSMENT — PAIN DESCRIPTION - ORIENTATION: ORIENTATION: RIGHT

## 2018-12-21 ASSESSMENT — PAIN DESCRIPTION - DESCRIPTORS: DESCRIPTORS: SHOOTING;SHARP

## 2018-12-21 ASSESSMENT — PAIN SCALES - WONG BAKER: WONGBAKER_NUMERICALRESPONSE: 2

## 2018-12-21 NOTE — ED PROVIDER NOTES
Cystocele     Diverticulosis     Hypercholesteremia     Lumbar radicular pain     Osteoarthritis of lumbosacral spine     Rotator cuff tear     Urinary incontinence     Uterine prolapse          SURGICALHISTORY       Past Surgical History:   Procedure Laterality Date    DILATION AND CURETTAGE OF UTERUS      HYSTERECTOMY      HYSTEROSCOPY           CURRENT MEDICATIONS       Previous Medications    CETIRIZINE (ZYRTEC) 10 MG TABLET    Take 10 mg by mouth daily. CETIRIZINE (ZYRTEC) 10 MG TABLET    Take 1 tablet by mouth    CYCLOBENZAPRINE (FLEXERIL) 10 MG TABLET    Take 10 mg by mouth 3 times daily as needed for Muscle spasms    DARIFENACIN (ENABLEX) 15 MG SR TABLET    Take 15 mg by mouth daily. DIAZEPAM (VALIUM) 5 MG TABLET    Take 5 mg by mouth every 6 hours as needed for Anxiety. [place] DICYCLOMINE (BENTYL) 10 MG CAPSULE    Take 1 capsule by mouth 4 times daily (before meals and nightly)    FAMOTIDINE (PEPCID) 20 MG TABLET    Take 1 tablet by mouth 2 times daily    HYDROXYZINE (ATARAX) 25 MG TABLET    Take 25 mg by mouth    HYDROXYZINE (ATARAX) 25 MG TABLET        IBUPROFEN (ADVIL;MOTRIN) 600 MG TABLET        LORAZEPAM (ATIVAN) 0.5 MG TABLET    Take 1 tablet by mouth every 6 hours as needed for Anxiety    PREGABALIN (LYRICA) 75 MG CAPSULE    Take 1 capsule by mouth 3 times daily    SIMVASTATIN (ZOCOR) 10 MG TABLET    Take 10 mg by mouth nightly.          ALLERGIES     Ciprofloxacin; Lipitor; and Penicillins    FAMILY HISTORY       Family History   Problem Relation Age of Onset    Stroke Mother     Heart Disease Mother     Heart Disease Father     Colon Cancer Father           SOCIAL HISTORY       Social History     Social History    Marital status: Single     Spouse name: N/A    Number of children: N/A    Years of education: N/A     Social History Main Topics    Smoking status: Current Every Day Smoker     Packs/day: 0.50     Types: Cigarettes    Smokeless tobacco: Never Used    Alcohol use No for the following:        Result Value    CREATININE 0.40 (*)     All other components within normal limits   CK - Abnormal; Notable for the following: Total  (*)     All other components within normal limits   CKMB & RELATIVE PERCENT - Abnormal; Notable for the following:     CK-MB 9.5 (*)     All other components within normal limits   CBC WITH AUTO DIFFERENTIAL   PROTIME-INR   APTT   TROPONIN   D-DIMER, QUANTITATIVE       All other labs were within normal range or not returned as of this dictation. EMERGENCY DEPARTMENT COURSE and DIFFERENTIAL DIAGNOSIS/MDM:   Vitals:    Vitals:    12/21/18 1301 12/21/18 1415 12/21/18 1510   BP: 118/65 101/62 102/86   Pulse: 53 (!) 46 54   Resp: 20 16 20   Temp: 97.5 °F (36.4 °C)     TempSrc: Temporal     SpO2: 98% 100% 98%   Weight: 120 lb (54.4 kg)     Height: 5' 3\" (1.6 m)              MDM  Number of Diagnoses or Management Options  Bronchitis:   Diagnosis management comments: Patient with complaint of right-sided chest pain which she states ongoing for last 48 hours she has no shortness of breath no nausea vomiting or dizziness pain is reproducible by deep inspiration or by movement of right upper extremity. Chest x-ray shows no acute pulmonary process cardiac enzymes are negative and white count with normal range patient is discharged with diagnosis of bronchitis sent home with a prescription for Zithromax and Tessalon Perles and Ultram for pain control. Patient blood pressures been on the lower side of normal during her time. Heart rate is been in the 50s she is not symptomatic to this orthostatics are negative at this time. Patient was advised if she has any worsening symptoms chest pain shortness of breath dizziness to return to the ER immediately otherwise recommend patient to follow-up with her family doctor in the next 48 hours for reevaluation.       CRITICAL CARE TIME   Total Critical Care time was 0 minutes, excluding separately

## 2018-12-22 ENCOUNTER — APPOINTMENT (OUTPATIENT)
Dept: GENERAL RADIOLOGY | Age: 58
End: 2018-12-22
Payer: COMMERCIAL

## 2018-12-22 ENCOUNTER — HOSPITAL ENCOUNTER (EMERGENCY)
Age: 58
Discharge: HOME | End: 2018-12-22
Attending: STUDENT IN AN ORGANIZED HEALTH CARE EDUCATION/TRAINING PROGRAM
Payer: COMMERCIAL

## 2018-12-22 VITALS
TEMPERATURE: 97.5 F | HEIGHT: 63 IN | RESPIRATION RATE: 16 BRPM | BODY MASS INDEX: 22.68 KG/M2 | HEART RATE: 54 BPM | WEIGHT: 128 LBS | DIASTOLIC BLOOD PRESSURE: 68 MMHG | OXYGEN SATURATION: 99 % | SYSTOLIC BLOOD PRESSURE: 111 MMHG

## 2018-12-22 DIAGNOSIS — H81.13 BENIGN PAROXYSMAL POSITIONAL VERTIGO DUE TO BILATERAL VESTIBULAR DISORDER: Primary | ICD-10-CM

## 2018-12-22 DIAGNOSIS — R11.2 NAUSEA AND VOMITING, INTRACTABILITY OF VOMITING NOT SPECIFIED, UNSPECIFIED VOMITING TYPE: ICD-10-CM

## 2018-12-22 LAB
BILIRUBIN URINE: NEGATIVE
BLOOD, URINE: NEGATIVE
CLARITY: CLEAR
COLOR: YELLOW
EKG ATRIAL RATE: 53 BPM
EKG P AXIS: 68 DEGREES
EKG P-R INTERVAL: 144 MS
EKG Q-T INTERVAL: 438 MS
EKG QRS DURATION: 68 MS
EKG QTC CALCULATION (BAZETT): 410 MS
EKG R AXIS: 46 DEGREES
EKG T AXIS: 55 DEGREES
EKG VENTRICULAR RATE: 53 BPM
GLUCOSE URINE: NEGATIVE MG/DL
KETONES, URINE: NEGATIVE MG/DL
LEUKOCYTE ESTERASE, URINE: NEGATIVE
NITRITE, URINE: NEGATIVE
PH UA: 7.5 (ref 5–9)
PROTEIN UA: NEGATIVE MG/DL
RAPID INFLUENZA  B AGN: NEGATIVE
RAPID INFLUENZA A AGN: NEGATIVE
SPECIFIC GRAVITY UA: 1 (ref 1–1.03)
URINE REFLEX TO CULTURE: NORMAL
UROBILINOGEN, URINE: 0.2 E.U./DL

## 2018-12-22 PROCEDURE — 81003 URINALYSIS AUTO W/O SCOPE: CPT

## 2018-12-22 PROCEDURE — 71046 X-RAY EXAM CHEST 2 VIEWS: CPT

## 2018-12-22 PROCEDURE — 93005 ELECTROCARDIOGRAM TRACING: CPT

## 2018-12-22 PROCEDURE — 99285 EMERGENCY DEPT VISIT HI MDM: CPT

## 2018-12-22 PROCEDURE — 87804 INFLUENZA ASSAY W/OPTIC: CPT

## 2018-12-22 PROCEDURE — 6370000000 HC RX 637 (ALT 250 FOR IP): Performed by: STUDENT IN AN ORGANIZED HEALTH CARE EDUCATION/TRAINING PROGRAM

## 2018-12-22 RX ORDER — MECLIZINE HCL 12.5 MG/1
12.5 TABLET ORAL ONCE
Status: COMPLETED | OUTPATIENT
Start: 2018-12-22 | End: 2018-12-22

## 2018-12-22 RX ORDER — MECLIZINE HYDROCHLORIDE 25 MG/1
25 TABLET ORAL 3 TIMES DAILY PRN
Qty: 15 TABLET | Refills: 0 | Status: SHIPPED | OUTPATIENT
Start: 2018-12-22 | End: 2021-03-02 | Stop reason: ALTCHOICE

## 2018-12-22 RX ADMIN — MECLIZINE 12.5 MG: 12.5 TABLET ORAL at 17:44

## 2018-12-22 ASSESSMENT — ENCOUNTER SYMPTOMS
DIARRHEA: 0
TROUBLE SWALLOWING: 0
NAUSEA: 1
COUGH: 0
CHEST TIGHTNESS: 0
VOMITING: 1
SINUS PRESSURE: 0
BACK PAIN: 0
SHORTNESS OF BREATH: 0
ABDOMINAL PAIN: 0

## 2018-12-22 ASSESSMENT — PAIN SCALES - GENERAL: PAINLEVEL_OUTOF10: 6

## 2018-12-22 ASSESSMENT — PAIN DESCRIPTION - FREQUENCY: FREQUENCY: CONTINUOUS

## 2018-12-22 ASSESSMENT — PAIN DESCRIPTION - ORIENTATION: ORIENTATION: RIGHT

## 2018-12-22 ASSESSMENT — PAIN DESCRIPTION - DESCRIPTORS: DESCRIPTORS: SHARP

## 2018-12-22 ASSESSMENT — PAIN DESCRIPTION - LOCATION: LOCATION: CHEST

## 2018-12-22 ASSESSMENT — PAIN DESCRIPTION - PAIN TYPE: TYPE: ACUTE PAIN

## 2018-12-22 NOTE — ED PROVIDER NOTES
[address] [facility] ED  eMERGENCY dEPARTMENT eNCOUnter      Pt Name: Ray Way  MRN: [id number]  [place] 1960  Date of evaluation: 12/22/2018  Provider: Yaakov An, [address] [place] [address]       Chief Complaint   Patient presents with    Dizziness     pt c/o nausea, vomiting, and dizziness that started today, Pt states she was seen here yesterday and Dx with bronchitis         HISTORY OF PRESENT ILLNESS   (Location/Symptom, Timing/Onset,Context/Setting, Quality, Duration, Modifying Factors, Severity)  Note limiting factors. Ray Way is a 60 y.o. female who presents to the emergency department with compliant of dizziness that is worse with head movement. Patient states she feels nauseated when moving her eyes. Patient says she was seen yesterday and has pain to the right chest with touch and arm movment. Patient had cardiac workup including d-dimer and was d/c'd with Rx of zithromax for bronchitis and chest wall pain. Dose of dizziness started today. Patient admits to bending over and cleaning often recently. She does daughter is present. I the patient's permission to interview, examine her, discussed her care with her daughter present. The history is provided by the patient and a relative. NursingNotes were reviewed. REVIEW OF SYSTEMS    (2-9 systems for level 4, 10 or more for level 5)     Review of Systems   Constitutional: Negative for activity change, appetite change, chills, fever and unexpected weight change. HENT: Negative for drooling, ear pain, nosebleeds, sinus pressure and trouble swallowing. Respiratory: Negative for cough, chest tightness and shortness of breath. Cardiovascular: Positive for chest pain ( With arm movement and touch to the right chest). Negative for leg swelling. Gastrointestinal: Positive for nausea and vomiting. Negative for abdominal pain and diarrhea. Endocrine: Negative for polydipsia and polyphagia.

## 2018-12-22 NOTE — ED NOTES
Patient informed of need for urine sample. Pt states she just urinated before she came back to her room. Pt provided water to assist with urination.      Jabari Arciniega RN  12/22/18 5733

## 2018-12-24 PROCEDURE — 93010 ELECTROCARDIOGRAM REPORT: CPT | Performed by: INTERNAL MEDICINE

## 2021-03-02 ENCOUNTER — APPOINTMENT (OUTPATIENT)
Dept: GENERAL RADIOLOGY | Age: 61
End: 2021-03-02
Payer: COMMERCIAL

## 2021-03-02 ENCOUNTER — APPOINTMENT (OUTPATIENT)
Dept: CT IMAGING | Age: 61
End: 2021-03-02
Payer: COMMERCIAL

## 2021-03-02 ENCOUNTER — HOSPITAL ENCOUNTER (EMERGENCY)
Age: 61
Discharge: HOME | End: 2021-03-02
Payer: COMMERCIAL

## 2021-03-02 VITALS
HEIGHT: 63 IN | WEIGHT: 127 LBS | RESPIRATION RATE: 16 BRPM | HEART RATE: 67 BPM | BODY MASS INDEX: 22.5 KG/M2 | OXYGEN SATURATION: 99 % | DIASTOLIC BLOOD PRESSURE: 54 MMHG | SYSTOLIC BLOOD PRESSURE: 117 MMHG | TEMPERATURE: 98 F

## 2021-03-02 DIAGNOSIS — K57.32 DIVERTICULITIS OF COLON: Primary | ICD-10-CM

## 2021-03-02 LAB
ALBUMIN SERPL-MCNC: 4.2 G/DL (ref 3.5–4.6)
ALP BLD-CCNC: 87 U/L (ref 40–130)
ALT SERPL-CCNC: 21 U/L (ref 0–33)
ANION GAP SERPL CALCULATED.3IONS-SCNC: 13 MEQ/L (ref 9–15)
AST SERPL-CCNC: 21 U/L (ref 0–35)
BASOPHILS ABSOLUTE: 0.1 K/UL (ref 0–0.2)
BASOPHILS RELATIVE PERCENT: 1 %
BILIRUB SERPL-MCNC: 0.6 MG/DL (ref 0.2–0.7)
BILIRUBIN URINE: NEGATIVE
BLOOD, URINE: NEGATIVE
BUN BLDV-MCNC: 6 MG/DL (ref 8–23)
CALCIUM SERPL-MCNC: 9 MG/DL (ref 8.5–9.9)
CHLORIDE BLD-SCNC: 103 MEQ/L (ref 95–107)
CLARITY: CLEAR
CO2: 23 MEQ/L (ref 20–31)
COLOR: YELLOW
CREAT SERPL-MCNC: 0.39 MG/DL (ref 0.5–0.9)
EOSINOPHILS ABSOLUTE: 0.2 K/UL (ref 0–0.7)
EOSINOPHILS RELATIVE PERCENT: 1.7 %
GFR AFRICAN AMERICAN: >60
GFR NON-AFRICAN AMERICAN: >60
GLOBULIN: 3 G/DL (ref 2.3–3.5)
GLUCOSE BLD-MCNC: 94 MG/DL (ref 70–99)
GLUCOSE URINE: NEGATIVE MG/DL
HCT VFR BLD CALC: 38.7 % (ref 37–47)
HEMOGLOBIN: 13.5 G/DL (ref 12–16)
KETONES, URINE: 15 MG/DL
LEUKOCYTE ESTERASE, URINE: NEGATIVE
LIPASE: 16 U/L (ref 12–95)
LYMPHOCYTES ABSOLUTE: 1.9 K/UL (ref 1–4.8)
LYMPHOCYTES RELATIVE PERCENT: 20.8 %
MCH RBC QN AUTO: 31.9 PG (ref 27–31.3)
MCHC RBC AUTO-ENTMCNC: 34.8 % (ref 33–37)
MCV RBC AUTO: 91.6 FL (ref 82–100)
MONOCYTES ABSOLUTE: 0.9 K/UL (ref 0.2–0.8)
MONOCYTES RELATIVE PERCENT: 10.4 %
NEUTROPHILS ABSOLUTE: 5.9 K/UL (ref 1.4–6.5)
NEUTROPHILS RELATIVE PERCENT: 66.1 %
NITRITE, URINE: NEGATIVE
PDW BLD-RTO: 13.3 % (ref 11.5–14.5)
PH UA: 6 (ref 5–9)
PLATELET # BLD: 265 K/UL (ref 130–400)
POTASSIUM SERPL-SCNC: 3.4 MEQ/L (ref 3.4–4.9)
PROTEIN UA: NEGATIVE MG/DL
RBC # BLD: 4.22 M/UL (ref 4.2–5.4)
SODIUM BLD-SCNC: 139 MEQ/L (ref 135–144)
SPECIFIC GRAVITY UA: 1 (ref 1–1.03)
TOTAL PROTEIN: 7.2 G/DL (ref 6.3–8)
URINE REFLEX TO CULTURE: ABNORMAL
UROBILINOGEN, URINE: 0.2 E.U./DL
WBC # BLD: 9 K/UL (ref 4.8–10.8)

## 2021-03-02 PROCEDURE — 80053 COMPREHEN METABOLIC PANEL: CPT

## 2021-03-02 PROCEDURE — 36415 COLL VENOUS BLD VENIPUNCTURE: CPT

## 2021-03-02 PROCEDURE — 6360000004 HC RX CONTRAST MEDICATION: Performed by: PHYSICIAN ASSISTANT

## 2021-03-02 PROCEDURE — 85025 COMPLETE CBC W/AUTO DIFF WBC: CPT

## 2021-03-02 PROCEDURE — 99285 EMERGENCY DEPT VISIT HI MDM: CPT

## 2021-03-02 PROCEDURE — 83690 ASSAY OF LIPASE: CPT

## 2021-03-02 PROCEDURE — 71045 X-RAY EXAM CHEST 1 VIEW: CPT

## 2021-03-02 PROCEDURE — 6360000002 HC RX W HCPCS: Performed by: PHYSICIAN ASSISTANT

## 2021-03-02 PROCEDURE — 96374 THER/PROPH/DIAG INJ IV PUSH: CPT

## 2021-03-02 PROCEDURE — 74177 CT ABD & PELVIS W/CONTRAST: CPT

## 2021-03-02 PROCEDURE — 81003 URINALYSIS AUTO W/O SCOPE: CPT

## 2021-03-02 RX ORDER — SULFAMETHOXAZOLE AND TRIMETHOPRIM 800; 160 MG/1; MG/1
1 TABLET ORAL 2 TIMES DAILY
Qty: 20 TABLET | Refills: 0 | Status: SHIPPED | OUTPATIENT
Start: 2021-03-02 | End: 2021-03-12

## 2021-03-02 RX ORDER — SULFAMETHOXAZOLE AND TRIMETHOPRIM 800; 160 MG/1; MG/1
1 TABLET ORAL ONCE
Status: DISCONTINUED | OUTPATIENT
Start: 2021-03-02 | End: 2021-03-03 | Stop reason: HOSPADM

## 2021-03-02 RX ORDER — NAPROXEN 500 MG/1
500 TABLET ORAL 2 TIMES DAILY
Qty: 20 TABLET | Refills: 0 | Status: SHIPPED | OUTPATIENT
Start: 2021-03-02 | End: 2021-04-07

## 2021-03-02 RX ORDER — KETOROLAC TROMETHAMINE 30 MG/ML
30 INJECTION, SOLUTION INTRAMUSCULAR; INTRAVENOUS ONCE
Status: COMPLETED | OUTPATIENT
Start: 2021-03-02 | End: 2021-03-02

## 2021-03-02 RX ADMIN — IOPAMIDOL 100 ML: 612 INJECTION, SOLUTION INTRAVENOUS at 22:10

## 2021-03-02 RX ADMIN — KETOROLAC TROMETHAMINE 30 MG: 30 INJECTION, SOLUTION INTRAMUSCULAR; INTRAVENOUS at 21:05

## 2021-03-02 ASSESSMENT — ENCOUNTER SYMPTOMS
ABDOMINAL PAIN: 1
COUGH: 1
EYE DISCHARGE: 0
BACK PAIN: 0
ABDOMINAL DISTENTION: 0
ANAL BLEEDING: 0
NAUSEA: 0
VOMITING: 0
APNEA: 0
PHOTOPHOBIA: 0
VOICE CHANGE: 0

## 2021-03-02 ASSESSMENT — PAIN DESCRIPTION - PAIN TYPE: TYPE: ACUTE PAIN

## 2021-03-02 ASSESSMENT — PAIN DESCRIPTION - LOCATION: LOCATION: ABDOMEN

## 2021-03-02 ASSESSMENT — PAIN DESCRIPTION - FREQUENCY: FREQUENCY: CONTINUOUS

## 2021-03-02 ASSESSMENT — PAIN DESCRIPTION - ONSET: ONSET: ON-GOING

## 2021-03-02 ASSESSMENT — PAIN DESCRIPTION - DESCRIPTORS: DESCRIPTORS: DULL;ACHING

## 2021-03-02 ASSESSMENT — PAIN SCALES - GENERAL: PAINLEVEL_OUTOF10: 7

## 2021-03-03 NOTE — ED TRIAGE NOTES
Patient came to the ED for generalized abdominal pain that began sunday. Pt describes the pain as aching. Last BM was this morning and \"soft\". Respirations even and unlabored. A&Ox4.

## 2021-03-03 NOTE — ED PROVIDER NOTES
[address] [facility] ED  eMERGENCY dEPARTMENT eNCOUnter      Pt Name: Dee Dee Almonte  MRN: [id number]  Armsbilliegfmarleen 1960  Date of evaluation: 3/2/2021  Provider: Ha Rodriguez PA-C    CHIEF COMPLAINT       Chief Complaint   Patient presents with    Abdominal Pain     that began Sunday         HISTORY OF PRESENT ILLNESS   (Location/Symptom, Timing/Onset,Context/Setting, Quality, Duration, Modifying Factors, Severity)  Note limiting factors. Dee Dee Almonte is a 57 y.o. female who presents to the emergency department nominal pain that began Sunday she notes that his right upper quadrant which also has left lower quadrant tenderness and decreased appetite. Without vomiting. Is moderate severity worse with touch or motion nothing improves symptoms. HPI    NursingNotes were reviewed. REVIEW OF SYSTEMS    (2-9 systems for level 4, 10 or more for level 5)     Review of Systems   Constitutional: Positive for appetite change and chills. Negative for activity change, fever and unexpected weight change. HENT: Negative for ear discharge, nosebleeds and voice change. Eyes: Negative for photophobia and discharge. Respiratory: Positive for cough. Negative for apnea. Cardiovascular: Negative for chest pain. Gastrointestinal: Positive for abdominal pain. Negative for abdominal distention, anal bleeding, nausea and vomiting. Endocrine: Negative for cold intolerance, heat intolerance and polyphagia. Genitourinary: Negative for genital sores. Musculoskeletal: Negative for back pain and joint swelling. Skin: Negative for pallor. Allergic/Immunologic: Negative for immunocompromised state. Neurological: Negative for seizures and facial asymmetry. Hematological: Does not bruise/bleed easily. Psychiatric/Behavioral: Negative for behavioral problems, self-injury and sleep disturbance. All other systems reviewed and are negative. Except as noted above the remainder of the review of systems was reviewed and negative. PAST MEDICAL HISTORY     Past Medical History:   Diagnosis Date    Cystocele     Diverticulosis     Hypercholesteremia     Lumbar radicular pain     Osteoarthritis of lumbosacral spine     Rotator cuff tear     Urinary incontinence     Uterine prolapse          SURGICALHISTORY       Past Surgical History:   Procedure Laterality Date    DILATION AND CURETTAGE OF UTERUS      HYSTERECTOMY      HYSTEROSCOPY           CURRENT MEDICATIONS       Previous Medications    CETIRIZINE (ZYRTEC) 10 MG TABLET    Take 10 mg by mouth daily. CYCLOBENZAPRINE (FLEXERIL) 10 MG TABLET    Take 10 mg by mouth 3 times daily as needed for Muscle spasms    HYDROXYZINE (ATARAX) 25 MG TABLET    Take 25 mg by mouth    HYDROXYZINE (ATARAX) 25 MG TABLET        SIMVASTATIN (ZOCOR) 10 MG TABLET    Take 10 mg by mouth nightly.       VITAMIN D (CHOLECALCIFEROL) 1000 UNITS TABS TABLET    Take 2,000 Units by mouth daily       ALLERGIES     Ciprofloxacin, Lipitor, Metronidazole, Penicillins, and Atorvastatin calcium    FAMILY HISTORY       Family History   Problem Relation Age of Onset    Stroke Mother     Heart Disease Mother     Heart Disease Father     Colon Cancer Father           SOCIAL HISTORY       Social History     Socioeconomic History    Marital status: Single     Spouse name: None    Number of children: None    Years of education: None    Highest education level: None   Occupational History    None   Social Needs    Financial resource strain: None    Food insecurity     Worry: None     Inability: None    Transportation needs     Medical: None     Non-medical: None   Tobacco Use    Smoking status: Current Every Day Smoker     Packs/day: 0.50     Types: Cigarettes    Smokeless tobacco: Never Used   Substance and Sexual Activity    Alcohol use: No    Drug use: No    Sexual activity: Not Currently   Lifestyle  Physical activity     Days per week: None     Minutes per session: None    Stress: None   Relationships    Social connections     Talks on phone: None     Gets together: None     Attends Jainism service: None     Active member of club or organization: None     Attends meetings of clubs or organizations: None     Relationship status: None    Intimate partner violence     Fear of current or ex partner: None     Emotionally abused: None     Physically abused: None     Forced sexual activity: None   Other Topics Concern    None   Social History Narrative    None       SCREENINGS    Mariusz Coma Scale  Eye Opening: Spontaneous  Best Verbal Response: Oriented  Best Motor Response: Obeys commands  [place] Coma Scale Score: 15 @FLOW([id number])@      PHYSICAL EXAM    (up to 7 for level 4, 8 or more for level 5)     ED Triage Vitals [03/02/21 2018]   BP Temp Temp Source Pulse Resp SpO2 Height Weight   113/76 98 °F (36.7 °C) Oral 79 16 96 % 5' 3\" (1.6 m) 127 lb (57.6 kg)       Physical Exam  Vitals signs and nursing note reviewed. Constitutional:       General: She is not in acute distress. Appearance: She is well-developed. HENT:      Head: Normocephalic and atraumatic. Right Ear: External ear normal.      Left Ear: External ear normal.      Nose: Nose normal.      Mouth/Throat:      Mouth: Mucous membranes are moist.   Eyes:      General:         Right eye: No discharge. Left eye: No discharge. Extraocular Movements: Extraocular movements intact. Pupils: Pupils are equal, round, and reactive to light. Neck:      Musculoskeletal: Normal range of motion and neck supple. Cardiovascular:      Rate and Rhythm: Normal rate and regular rhythm. Pulses: Normal pulses. Heart sounds: Normal heart sounds. Pulmonary:      Effort: Pulmonary effort is normal. No respiratory distress. Breath sounds: Normal breath sounds. No stridor.    Abdominal: General: Bowel sounds are normal. There is no distension. Palpations: Abdomen is soft. Tenderness: There is abdominal tenderness in the right upper quadrant and left lower quadrant. There is no right CVA tenderness or left CVA tenderness. Musculoskeletal: Normal range of motion. Skin:     General: Skin is warm. Findings: No erythema. Neurological:      Mental Status: She is alert and oriented to person, place, and time. Gait: Gait normal.   Psychiatric:         Mood and Affect: Mood normal.         DIAGNOSTIC RESULTS     EKG: All EKG's are interpreted by the Emergency Department Physician who either signs or Co-signsthis chart in the absence of a cardiologist.         RADIOLOGY:   Tyler Shoe such as CT, Ultrasound and MRI are read by the radiologist. Plain radiographic images are visualized and preliminarily interpreted by the emergency physician with the below findings:    Preliminary CT report/ cxr neg    Interpretation per the Radiologist below, if available at the time ofthis note:    CT ABDOMEN PELVIS W IV CONTRAST Additional Contrast? None    (Results Pending)   XR CHEST PORTABLE    (Results Pending)         ED BEDSIDE ULTRASOUND:   Performed by ED Physician - none    LABS:  Labs Reviewed   COMPREHENSIVE METABOLIC PANEL - Abnormal; Notable for the following components:       Result Value    BUN 6 (*)     CREATININE 0.39 (*)     All other components within normal limits   CBC WITH AUTO DIFFERENTIAL - Abnormal; Notable for the following components:    MCH 31.9 (*)     Monocytes Absolute 0.9 (*)     All other components within normal limits   URINE RT REFLEX TO CULTURE - Abnormal; Notable for the following components:    Ketones, Urine 15 (*)     All other components within normal limits   LIPASE       All other labs were within normal range or not returned as of this dictation.     EMERGENCY DEPARTMENT COURSE and DIFFERENTIAL DIAGNOSIS/MDM:   Vitals:    Vitals: 03/02/21 2018 03/02/21 2107 03/02/21 2137 03/02/21 2219   BP: 113/76 96/62 (!) 99/54 (!) 117/54   Pulse: 79  63 67   Resp: 16  16 16   Temp: 98 °F (36.7 °C)      TempSrc: Oral      SpO2: 96% 99% 97% 99%   Weight: 127 lb (57.6 kg)      Height: 5' 3\" (1.6 m)               MDM  Number of Diagnoses or Management Options  Diverticulitis of colon  Diagnosis management comments: Has possible mild case of diverticulitis per CT report. Discussed with Marivel Peraza official pharmacy consult patient has allergies to Flagyl Cipro and penicillin all of which cause large hives. He recommends Bactrim as monotherapy patient follow-up with primary care physician return to if any symptoms worsen new symptoms well. States she had a reaction when she took Bactrim and Flagyl together but has an allergy to Flagyl. We discussed she will take the prescription for Bactrim follow-up with primary care physician and GI. Return to if any symptoms worsen understands well. Amount and/or Complexity of Data Reviewed  Clinical lab tests: reviewed and ordered  Tests in the radiology section of CPT®: reviewed and ordered          CONSULTS:  None    PROCEDURES:  Unless otherwise noted below, none     Procedures    FINAL IMPRESSION      1.  Diverticulitis of colon          DISPOSITION/PLAN   DISPOSITION Decision To Discharge 03/02/2021 10:52:02 PM      PATIENT REFERRED TO:  Fish Willett DO  [address]  [place] [address] [address]  [phone]    Call in 1 day      [facility]) ED  [address]  [address] [address]  [phone]    If symptoms worsen    Lul Carreon MD  [address] [facility]  [address] [facility] [address] [phone]    Call in 1 day        DISCHARGE MEDICATIONS:  New Prescriptions    NAPROXEN (NAPROSYN) 500 MG TABLET    Take 1 tablet by mouth 2 times daily for 20 doses    SULFAMETHOXAZOLE-TRIMETHOPRIM (BACTRIM DS) 800-160 MG PER TABLET    Take 1 tablet by mouth 2 times daily for 10 days (Please note that portions of this note were completed with a voice recognition program.  Efforts were made to edit the dictations but occasionally words are mis-transcribed.)    Martha Kelley PA-C (electronically signed)  Attending Emergency Physician       Martha Kelley PA-C  03/02/21 SALLIE Weston  03/02/21 [id number]

## 2021-04-07 ENCOUNTER — OFFICE VISIT (OUTPATIENT)
Dept: GASTROENTEROLOGY | Age: 61
End: 2021-04-07
Payer: COMMERCIAL

## 2021-04-07 VITALS — BODY MASS INDEX: 22.32 KG/M2 | WEIGHT: 126 LBS | HEART RATE: 65 BPM | OXYGEN SATURATION: 100 % | HEIGHT: 63 IN

## 2021-04-07 DIAGNOSIS — Z87.19 HISTORY OF DIVERTICULOSIS: ICD-10-CM

## 2021-04-07 DIAGNOSIS — R10.32 LLQ PAIN: Primary | ICD-10-CM

## 2021-04-07 PROCEDURE — 4004F PT TOBACCO SCREEN RCVD TLK: CPT | Performed by: INTERNAL MEDICINE

## 2021-04-07 PROCEDURE — G8427 DOCREV CUR MEDS BY ELIG CLIN: HCPCS | Performed by: INTERNAL MEDICINE

## 2021-04-07 PROCEDURE — 3017F COLORECTAL CA SCREEN DOC REV: CPT | Performed by: INTERNAL MEDICINE

## 2021-04-07 PROCEDURE — 99204 OFFICE O/P NEW MOD 45 MIN: CPT | Performed by: INTERNAL MEDICINE

## 2021-04-07 PROCEDURE — G8420 CALC BMI NORM PARAMETERS: HCPCS | Performed by: INTERNAL MEDICINE

## 2021-04-07 RX ORDER — ALUMINUM ZIRCONIUM OCTACHLOROHYDREX GLY 16 G/100G
GEL TOPICAL
Qty: 425 G | Refills: 2 | Status: SHIPPED | OUTPATIENT
Start: 2021-04-07

## 2021-04-07 RX ORDER — DICYCLOMINE HYDROCHLORIDE 10 MG/1
10 CAPSULE ORAL
Qty: 90 CAPSULE | Refills: 3 | Status: SHIPPED | OUTPATIENT
Start: 2021-04-07

## 2021-04-07 NOTE — PROGRESS NOTES
Gastroenterology Clinic Visit    Kavita Gill  [id number]  Chief Complaint   Patient presents with    New Patient     HPI: 57 y.o. female presents to the clinic with history of diverticulosis, presented to the emergency room in early March with severe abdominal pain. Patient was treated as diverticulitis despite CT scan showing no evidence for diverticulitis. Patient has allergies to multiple antibiotics and hence did not take the Bactrim as advised by the ER. Patient continues to talk about diverticulitis and severe pain despite informing her that she had no evidence for diverticulitis during the presentation to the ER. Patient is a poor historian  Review of previous charting appears to show episodes of diverticulitis in 2014 and 2018. Patient underwent a surveillance/screening colonoscopy at [facility] in October 2020. Report not available. Patient reports no findings of polyp. Patient reports having a good diet with fiber rich foods. Patient not on any fiber supplements or any antispasmodics. Patient reports daily bowel movements 2-3 times a day, anywhere from soft to hard. Denies any blood in the stool. Weight stable, appetite low    Previous GI work up/Endoscopic investigations:   Colonoscopy: October 2020 at [facility]: Report awaited    Review of Systems   All other systems reviewed and are negative.      Past Medical History:   Diagnosis Date    Cystocele     Diverticulosis     Hypercholesteremia     Lumbar radicular pain     Osteoarthritis of lumbosacral spine     Rotator cuff tear     Urinary incontinence     Uterine prolapse      Past Surgical History:   Procedure Laterality Date    DILATION AND CURETTAGE OF UTERUS      HYSTERECTOMY      HYSTEROSCOPY       Current Outpatient Medications on File Prior to Visit   Medication Sig Dispense Refill    BIOTIN PO Take 5,000 mcg by mouth daily      vitamin D (CHOLECALCIFEROL) 1000 units TABS tablet Take 2,000 Units by mouth daily      hydrOXYzine (ATARAX) 25 MG tablet Take 25 mg by mouth      cetirizine (ZYRTEC) 10 MG tablet Take 10 mg by mouth daily.  simvastatin (ZOCOR) 10 MG tablet Take 10 mg by mouth nightly. No current facility-administered medications on file prior to visit. Family History   Problem Relation Age of Onset    Stroke Mother     Heart Disease Mother     Heart Disease Father     Colon Cancer Father      Social History     Socioeconomic History    Marital status: Single     Spouse name: None    Number of children: None    Years of education: None    Highest education level: None   Occupational History    None   Social Needs    Financial resource strain: None    Food insecurity     Worry: None     Inability: None    Transportation needs     Medical: None     Non-medical: None   Tobacco Use    Smoking status: Current Every Day Smoker     Packs/day: 0.50     Types: Cigarettes    Smokeless tobacco: Never Used   Substance and Sexual Activity    Alcohol use: No    Drug use: No    Sexual activity: Not Currently   Lifestyle    Physical activity     Days per week: None     Minutes per session: None    Stress: None   Relationships    Social connections     Talks on phone: None     Gets together: None     Attends Jehovah's witness service: None     Active member of club or organization: None     Attends meetings of clubs or organizations: None     Relationship status: None    Intimate partner violence     Fear of current or ex partner: None     Emotionally abused: None     Physically abused: None     Forced sexual activity: None   Other Topics Concern    None   Social History Narrative    None     Pulse 65, height 5' 3\" (1.6 m), weight 126 lb (57.2 kg), SpO2 100 %. Physical Exam  Constitutional:       General: She is not in acute distress. Appearance: Normal appearance. She is well-developed. Eyes:      General: No scleral icterus.   Cardiovascular:      Rate and Rhythm: Normal rate and regular rhythm. Pulmonary:      Effort: Pulmonary effort is normal.      Breath sounds: Normal breath sounds. Abdominal:      General: Bowel sounds are normal. There is no distension. Palpations: Abdomen is soft. There is no mass. Tenderness: There is no abdominal tenderness. There is no guarding or rebound. Musculoskeletal: Normal range of motion. Lymphadenopathy:      Cervical: No cervical adenopathy. Neurological:      Mental Status: She is alert and oriented to person, place, and time. Psychiatric:         Behavior: Behavior normal.         Thought Content: Thought content normal.         Judgment: Judgment normal.       Laboratory, Pathology, Radiology reviewed indetail with relevant important investigations summarized below:  Lab Results   Component Value Date    WBC 9.0 03/02/2021    HGB 13.5 03/02/2021    HCT 38.7 03/02/2021    MCV 91.6 03/02/2021     03/02/2021     Lab Results   Component Value Date    ALT 21 03/02/2021    AST 21 03/02/2021    ALKPHOS 87 03/02/2021    BILITOT 0.6 03/02/2021     CT scan from 3/2/2021 reviewed: Diverticulosis    Assessment and Plan:  57 y.o. female with presents to the GI clinic with history of suspected diverticulitis in early March 2021. Patient presented to the emergency room with severe left lower quadrant abdominal pain. CT scan does not show any evidence for diverticulitis. Patient with previous episodes of diverticulitis in 2018 2014. Patient appears stable have limited understanding of diverticulosis/diverticulitis and her abdominal symptoms. Detailed discussion undertaken regarding etiology, presentation, prognosis and management plan for diverticulosis and diverticulitis.   -Recommend fiber supplementation with Metamucil 2 to 3 teaspoons with 8 to 10 ounces of water daily  -Recommend dicyclomine 10 mg twice daily/3 times daily on a regular basis to control symptoms  -Detailed discussion also undertaken regarding surgical options.  -We will obtain old colonoscopy records to evaluate for extent of- Recommend High-fiber diet, fiber supplementation with OTC fiber. Keep stools soft and regular, avoid constipation and straining, use Anusol/preparation H ointment/cream for hemorrhoids if/when symptomatic.  - Follow up with primary care physician    > 50% of 40 minutes was spent spent on counseling, coordinating care based on my plan and assessment as noted above. Return in about 6 weeks (around 5/19/2021). Rebel Lopez MD   Staff Gastroenterologist  [facility]    Please note this report has been partially produced using speech recognition software and may cause contain errors related to thatsystem including grammar, punctuation and spelling as well as words and phrases that may seem inappropriate. If there are questions or concerns please feel free to contact me to clarify.

## 2021-10-14 ENCOUNTER — HOSPITAL ENCOUNTER (EMERGENCY)
Age: 61
Discharge: LEFT BEFORE DISCHARGE | End: 2021-10-14
Payer: COMMERCIAL

## 2021-10-14 ENCOUNTER — APPOINTMENT (OUTPATIENT)
Dept: GENERAL RADIOLOGY | Age: 61
End: 2021-10-14
Payer: COMMERCIAL

## 2021-10-14 VITALS
TEMPERATURE: 97.9 F | BODY MASS INDEX: 22.32 KG/M2 | SYSTOLIC BLOOD PRESSURE: 116 MMHG | OXYGEN SATURATION: 97 % | RESPIRATION RATE: 18 BRPM | HEART RATE: 78 BPM | HEIGHT: 63 IN | WEIGHT: 126 LBS | DIASTOLIC BLOOD PRESSURE: 71 MMHG

## 2021-10-14 PROCEDURE — 73630 X-RAY EXAM OF FOOT: CPT

## 2021-10-14 PROCEDURE — 4500000002 HC ER NO CHARGE

## 2021-10-14 PROCEDURE — 73610 X-RAY EXAM OF ANKLE: CPT

## 2021-10-14 ASSESSMENT — PAIN DESCRIPTION - ORIENTATION: ORIENTATION: RIGHT

## 2021-10-14 ASSESSMENT — PAIN DESCRIPTION - LOCATION: LOCATION: ANKLE

## 2021-10-14 ASSESSMENT — PAIN SCALES - GENERAL: PAINLEVEL_OUTOF10: 8

## 2021-10-14 ASSESSMENT — PAIN DESCRIPTION - PAIN TYPE: TYPE: ACUTE PAIN

## 2021-10-15 ENCOUNTER — HOSPITAL ENCOUNTER (EMERGENCY)
Age: 61
Discharge: HOME | End: 2021-10-15
Payer: COMMERCIAL

## 2021-10-15 VITALS
RESPIRATION RATE: 20 BRPM | TEMPERATURE: 97.8 F | OXYGEN SATURATION: 98 % | BODY MASS INDEX: 22.5 KG/M2 | HEIGHT: 63 IN | DIASTOLIC BLOOD PRESSURE: 79 MMHG | HEART RATE: 72 BPM | WEIGHT: 127 LBS | SYSTOLIC BLOOD PRESSURE: 118 MMHG

## 2021-10-15 DIAGNOSIS — S93.401A MODERATE RIGHT ANKLE SPRAIN, INITIAL ENCOUNTER: Primary | ICD-10-CM

## 2021-10-15 PROCEDURE — 96372 THER/PROPH/DIAG INJ SC/IM: CPT

## 2021-10-15 PROCEDURE — 99283 EMERGENCY DEPT VISIT LOW MDM: CPT

## 2021-10-15 PROCEDURE — 6360000002 HC RX W HCPCS: Performed by: NURSE PRACTITIONER

## 2021-10-15 RX ORDER — IBUPROFEN 600 MG/1
600 TABLET ORAL EVERY 6 HOURS PRN
Qty: 28 TABLET | Refills: 0 | Status: SHIPPED | OUTPATIENT
Start: 2021-10-15 | End: 2022-04-18

## 2021-10-15 RX ORDER — KETOROLAC TROMETHAMINE 30 MG/ML
30 INJECTION, SOLUTION INTRAMUSCULAR; INTRAVENOUS ONCE
Status: COMPLETED | OUTPATIENT
Start: 2021-10-15 | End: 2021-10-15

## 2021-10-15 RX ORDER — HYDROCODONE BITARTRATE AND ACETAMINOPHEN 5; 325 MG/1; MG/1
1 TABLET ORAL EVERY 6 HOURS PRN
Qty: 12 TABLET | Refills: 0 | Status: SHIPPED | OUTPATIENT
Start: 2021-10-15 | End: 2021-10-18

## 2021-10-15 RX ADMIN — KETOROLAC TROMETHAMINE 30 MG: 30 INJECTION, SOLUTION INTRAMUSCULAR at 16:53

## 2021-10-15 ASSESSMENT — ENCOUNTER SYMPTOMS
COLOR CHANGE: 0
CHEST TIGHTNESS: 0
VOMITING: 0
SHORTNESS OF BREATH: 0
RHINORRHEA: 0
TROUBLE SWALLOWING: 0
ABDOMINAL PAIN: 0
BACK PAIN: 0
NAUSEA: 0
SINUS PAIN: 0
COUGH: 0
WHEEZING: 0
SORE THROAT: 0
DIARRHEA: 0
SINUS PRESSURE: 0

## 2021-10-15 ASSESSMENT — PAIN SCALES - GENERAL
PAINLEVEL_OUTOF10: 10
PAINLEVEL_OUTOF10: 10

## 2021-10-15 ASSESSMENT — PAIN DESCRIPTION - PAIN TYPE: TYPE: ACUTE PAIN

## 2021-10-15 ASSESSMENT — PAIN DESCRIPTION - LOCATION: LOCATION: ANKLE

## 2021-10-15 ASSESSMENT — PAIN DESCRIPTION - ORIENTATION: ORIENTATION: RIGHT

## 2021-10-15 NOTE — ED PROVIDER NOTES
[address] [facility] ED  EMERGENCY DEPARTMENT ENCOUNTER      Pt Name: Dean Price  MRN: [id number]  Destingfmarleen 1960  Date of evaluation: 10/15/2021  Provider: MARKO Paz [id number]       Chief Complaint   Patient presents with    Ankle Pain     and swelling, twisted 2 days ago         HISTORY OF PRESENT ILLNESS   (Location/Symptom, Timing/Onset,Context/Setting, Quality, Duration, Modifying Factors, Severity)  Note limiting factors. Dean Price is a 57 y.o. female who presents to the emergency department for complaint of ankle pain redness swelling. Patient states that 2 days ago she excellently twisted her right ankle walking denies injury to others her body. She did come to the ER yesterday but there was a long wait she did get the x-rays done but left afterward due to the prolonged delay. She states she is been resting and icing and elevating at home which was helping some but when she walks she has pain of 10 out of 10 sharp aching throbbing pain. She did have a previous fracture to that ankle without surgical repair and 93 and since then #1 small has some pain and discomfort of the ankle. She states that she is been try to keep off of it as much as possible today which did help but the pain and swelling returned as soon as she attempted to stand this afternoon. Denies any other open wounds or injuries. Nursing Notes were reviewed. REVIEW OF SYSTEMS    (2-9 systems for level 4, 10 or more for level 5)     Review of Systems   Constitutional: Negative for activity change, appetite change, chills, diaphoresis, fatigue and fever. HENT: Negative for congestion, ear pain, postnasal drip, rhinorrhea, sinus pressure, sinus pain, sore throat and trouble swallowing. Eyes: Negative for visual disturbance. Respiratory: Negative for cough, chest tightness, shortness of breath and wheezing. Cardiovascular: Negative for chest pain and palpitations. Gastrointestinal: Negative for abdominal pain, diarrhea, nausea and vomiting. Genitourinary: Negative for difficulty urinating, dysuria, flank pain and frequency. Musculoskeletal: Positive for arthralgias, joint swelling and myalgias. Negative for back pain, gait problem, neck pain and neck stiffness. Skin: Negative for color change, rash and wound. Neurological: Negative for dizziness, tremors, seizures, syncope, speech difficulty, weakness, light-headedness, numbness and headaches. Except as noted above the remainder of the review of systems was reviewed and negative. PAST MEDICAL HISTORY     Past Medical History:   Diagnosis Date    Cystocele     Diverticulosis     Hypercholesteremia     Lumbar radicular pain     Osteoarthritis of lumbosacral spine     Rotator cuff tear     Urinary incontinence     Uterine prolapse      Past Surgical History:   Procedure Laterality Date    DILATION AND CURETTAGE OF UTERUS      HYSTERECTOMY      HYSTEROSCOPY       Social History     Socioeconomic History    Marital status: Single     Spouse name: None    Number of children: None    Years of education: None    Highest education level: None   Occupational History    None   Tobacco Use    Smoking status: Current Every Day Smoker     Packs/day: 0.50     Types: Cigarettes    Smokeless tobacco: Never Used   Vaping Use    Vaping Use: Never used   Substance and Sexual Activity    Alcohol use: No    Drug use: No    Sexual activity: Not Currently   Other Topics Concern    None   Social History Narrative    None     Social Determinants of Health     Financial Resource Strain:     Difficulty of Paying Living Expenses:    Food Insecurity:     Worried About Running Out of Food in the Last Year:     Ran Out of Food in the Last Year:    Transportation Needs:     Lack of Transportation (Medical):      Lack of Transportation (Non-Medical):    Physical Activity:     Days of Exercise per Week:     Minutes of Exercise per Session:    Stress:     Feeling of Stress :    Social Connections:     Frequency of Communication with Friends and Family:     Frequency of Social Gatherings with Friends and Family:     Attends Adventist Services:     Active Member of Clubs or Organizations:     Attends Club or Organization Meetings:     Marital Status:    Intimate Partner Violence:     Fear of Current or Ex-Partner:     Emotionally Abused:     Physically Abused:     Sexually Abused:        SCREENINGS             PHYSICAL EXAM    (up to 7 for level 4, 8 or more for level 5)     ED Triage Vitals [10/15/21 1555]   BP Temp Temp Source Pulse Resp SpO2 Height Weight   118/79 97.8 °F (36.6 °C) Temporal 72 20 98 % 5' 3\" (1.6 m) 127 lb (57.6 kg)       Physical Exam  Constitutional:       General: She is not in acute distress. Appearance: Normal appearance. She is normal weight. She is not ill-appearing, toxic-appearing or diaphoretic. HENT:      Head: Normocephalic and atraumatic. Right Ear: External ear normal.      Left Ear: External ear normal.   Eyes:      General:         Right eye: No discharge. Left eye: No discharge. Conjunctiva/sclera: Conjunctivae normal.      Pupils: Pupils are equal, round, and reactive to light. Cardiovascular:      Rate and Rhythm: Normal rate and regular rhythm. Pulses: Normal pulses. Pulmonary:      Effort: Pulmonary effort is normal.   Musculoskeletal:         General: Swelling, tenderness and signs of injury present. No deformity. Cervical back: Normal range of motion and neck supple. No tenderness. Right knee: Normal.      Right ankle: Swelling present. No ecchymosis or lacerations. Tenderness present over the lateral malleolus and medial malleolus. Decreased range of motion. Right Achilles Tendon: Normal.      Right foot: Normal.        Feet:    Skin:     General: Skin is warm and dry.       Capillary Refill: Capillary refill takes less than 2 seconds. Neurological:      General: No focal deficit present. Mental Status: She is alert and oriented to person, place, and time. Mental status is at baseline. Cranial Nerves: No cranial nerve deficit. Sensory: No sensory deficit. Motor: No weakness. Coordination: Coordination normal.         RESULTS     EKG: All EKG's are interpreted by the Emergency Department Physician who either signs or Co-signsthis chart in the absence of a cardiologist.        RADIOLOGY:   Brittney Lust such as CT, Ultrasound and MRI are read by the radiologist. Plain radiographic images are visualized and preliminarily interpreted by the emergency physician with the below findings:        Interpretation per the Radiologist below, if available at the time ofthis note:    No orders to display         ED BEDSIDE ULTRASOUND:   Performed by ED Physician - none    LABS:  Labs Reviewed - No data to display    All other labs were within normal range or not returned as of this dictation. EMERGENCY DEPARTMENT COURSE and DIFFERENTIAL DIAGNOSIS/MDM:   Vitals:    Vitals:    10/15/21 1555   BP: 118/79   Pulse: 72   Resp: 20   Temp: 97.8 °F (36.6 °C)   TempSrc: Temporal   SpO2: 98%   Weight: 127 lb (57.6 kg)   Height: 5' 3\" (1.6 m)            MDM patient is afebrile nontoxic no acute distress hemodynamically stable. She does have notable swelling and tenderness of the right ankle. She had x-rays performed yesterday which were reviewed show no acute fracture with notable soft tissue swelling suggestive of moderate sprain of the right ankle. She provided with a walking boot and crutches the ER given IM Toradol. Discharged home with additional pain medication and the supportive devices. Provide kind information follow-up for podiatry for additional evaluation. Return to the ER if any onset of new concerns worsen condition. Patient verbalized understandable given instruction education.     CRITICAL CARE TIME CONSULTS:  None    PROCEDURES:  Unless otherwise noted below, none     Procedures    FINAL IMPRESSION      1. Moderate right ankle sprain, initial encounter          DISPOSITION/PLAN   DISPOSITION Discharge - Pending Orders Complete 10/15/2021 04:22:46 PM      PATIENT REFERRED TO:  Greg Mccall 121  [phone]    Call in 1 day      Telma Craig   [address] [facility]  [address]  [phone]    Call in 1 day        DISCHARGE MEDICATIONS:  New Prescriptions    HYDROCODONE-ACETAMINOPHEN (NORCO) 5-325 MG PER TABLET    Take 1 tablet by mouth every 6 hours as needed for Pain for up to 3 days. Intended supply: 3 days.  Take lowest dose possible to manage pain    IBUPROFEN (ADVIL;MOTRIN) 600 MG TABLET    Take 1 tablet by mouth every 6 hours as needed for Pain          (Please notethat portions of this note were completed with a voice recognition program.  Efforts were made to edit the dictations but occasionally words are mis-transcribed.)    MARKO Delaney CNP (electronically signed)  Attending Emergency Physician         MARKO Delaney CNP  10/15/21 [address], [address] [place] [address]  10/15/21 6711

## 2021-10-15 NOTE — ED TRIAGE NOTES
To ED with c/o right ankle pain and swelling after twisting it 2 days ago. Ambulatory with limp. Skin warm, dry, intact. MSPs intact.

## 2022-04-18 ENCOUNTER — HOSPITAL ENCOUNTER (EMERGENCY)
Age: 62
Discharge: HOME | End: 2022-04-18
Payer: COMMERCIAL

## 2022-04-18 VITALS
DIASTOLIC BLOOD PRESSURE: 71 MMHG | OXYGEN SATURATION: 97 % | RESPIRATION RATE: 16 BRPM | BODY MASS INDEX: 20.4 KG/M2 | WEIGHT: 130 LBS | SYSTOLIC BLOOD PRESSURE: 127 MMHG | HEART RATE: 73 BPM | TEMPERATURE: 95.1 F | HEIGHT: 67 IN

## 2022-04-18 DIAGNOSIS — M54.30 SCIATICA, UNSPECIFIED LATERALITY: Primary | ICD-10-CM

## 2022-04-18 PROCEDURE — 99283 EMERGENCY DEPT VISIT LOW MDM: CPT

## 2022-04-18 PROCEDURE — 6360000002 HC RX W HCPCS: Performed by: STUDENT IN AN ORGANIZED HEALTH CARE EDUCATION/TRAINING PROGRAM

## 2022-04-18 PROCEDURE — 96372 THER/PROPH/DIAG INJ SC/IM: CPT

## 2022-04-18 PROCEDURE — 6370000000 HC RX 637 (ALT 250 FOR IP): Performed by: STUDENT IN AN ORGANIZED HEALTH CARE EDUCATION/TRAINING PROGRAM

## 2022-04-18 RX ORDER — IBUPROFEN 800 MG/1
800 TABLET ORAL 2 TIMES DAILY PRN
Qty: 30 TABLET | Refills: 0 | Status: SHIPPED | OUTPATIENT
Start: 2022-04-18

## 2022-04-18 RX ORDER — CYCLOBENZAPRINE HCL 10 MG
10 TABLET ORAL ONCE
Status: COMPLETED | OUTPATIENT
Start: 2022-04-18 | End: 2022-04-18

## 2022-04-18 RX ORDER — KETOROLAC TROMETHAMINE 30 MG/ML
30 INJECTION, SOLUTION INTRAMUSCULAR; INTRAVENOUS ONCE
Status: COMPLETED | OUTPATIENT
Start: 2022-04-18 | End: 2022-04-18

## 2022-04-18 RX ORDER — OXYCODONE HYDROCHLORIDE AND ACETAMINOPHEN 5; 325 MG/1; MG/1
1 TABLET ORAL ONCE
Status: COMPLETED | OUTPATIENT
Start: 2022-04-18 | End: 2022-04-18

## 2022-04-18 RX ORDER — HYDROCODONE BITARTRATE AND ACETAMINOPHEN 5; 325 MG/1; MG/1
1 TABLET ORAL EVERY 6 HOURS PRN
Qty: 10 TABLET | Refills: 0 | Status: SHIPPED | OUTPATIENT
Start: 2022-04-18 | End: 2022-04-21

## 2022-04-18 RX ORDER — METHYLPREDNISOLONE ACETATE 80 MG/ML
80 INJECTION, SUSPENSION INTRA-ARTICULAR; INTRALESIONAL; INTRAMUSCULAR; SOFT TISSUE ONCE
Status: COMPLETED | OUTPATIENT
Start: 2022-04-18 | End: 2022-04-18

## 2022-04-18 RX ADMIN — METHYLPREDNISOLONE ACETATE 80 MG: 80 INJECTION, SUSPENSION INTRA-ARTICULAR; INTRALESIONAL; INTRAMUSCULAR; SOFT TISSUE at 13:33

## 2022-04-18 RX ADMIN — KETOROLAC TROMETHAMINE 30 MG: 30 INJECTION, SOLUTION INTRAMUSCULAR at 13:33

## 2022-04-18 RX ADMIN — CYCLOBENZAPRINE 10 MG: 10 TABLET, FILM COATED ORAL at 13:32

## 2022-04-18 RX ADMIN — OXYCODONE AND ACETAMINOPHEN 1 TABLET: 5; 325 TABLET ORAL at 14:15

## 2022-04-18 ASSESSMENT — PAIN SCALES - GENERAL
PAINLEVEL_OUTOF10: 8
PAINLEVEL_OUTOF10: 10
PAINLEVEL_OUTOF10: 8
PAINLEVEL_OUTOF10: 10

## 2022-04-18 ASSESSMENT — PAIN DESCRIPTION - PAIN TYPE
TYPE: ACUTE PAIN
TYPE: ACUTE PAIN

## 2022-04-18 ASSESSMENT — ENCOUNTER SYMPTOMS
DIARRHEA: 0
SORE THROAT: 0
EYE PAIN: 0
NAUSEA: 0
BACK PAIN: 1
CHEST TIGHTNESS: 0
SHORTNESS OF BREATH: 0

## 2022-04-18 ASSESSMENT — PAIN DESCRIPTION - ORIENTATION
ORIENTATION: LEFT;LOWER
ORIENTATION: LOWER

## 2022-04-18 ASSESSMENT — PAIN - FUNCTIONAL ASSESSMENT
PAIN_FUNCTIONAL_ASSESSMENT: PREVENTS OR INTERFERES WITH MANY ACTIVE NOT PASSIVE ACTIVITIES
PAIN_FUNCTIONAL_ASSESSMENT: 0-10
PAIN_FUNCTIONAL_ASSESSMENT: 0-10

## 2022-04-18 ASSESSMENT — PAIN DESCRIPTION - FREQUENCY: FREQUENCY: CONTINUOUS

## 2022-04-18 ASSESSMENT — PAIN DESCRIPTION - ONSET: ONSET: ON-GOING

## 2022-04-18 ASSESSMENT — PAIN DESCRIPTION - DESCRIPTORS: DESCRIPTORS: ACHING

## 2022-04-18 ASSESSMENT — PAIN DESCRIPTION - LOCATION
LOCATION: BACK;LEG
LOCATION: BACK

## 2022-04-18 NOTE — ED PROVIDER NOTES
[address] [facility] ED  eMERGENCYdEPARTMENT eNCOUnter      Pt Name: Reese Allred  MRN: [id number]  Armsbilliegfmarleen 1960  Date of evaluation: 4/18/2022  Provider:Carrillo Lepe PA-C    CHIEF COMPLAINT           HPI  Reese Allred is a 60 y.o. female per chart review has a h/o prolapsed bladder, lumbar radiculopathy, spinal stenosis of lumbar region, osteoarthritis of lumbosacral spine presents with back pain. Patient reports gradual onset, acute on chronic, severe, sharp, radiating down the left leg back pain that is been ongoing for the past 16 days. Patient states she was seen at [facility] 2 weeks ago and given muscle relaxers and naproxen which provided no relief. She states she has a follow-up appointment with her primary doctor but it is not for another 2 weeks and she cannot take the pain. She denies bowel or bladder incontinence, fever, saddle anesthesia, IV drug use, foot drop, abdominal pain. ROS  Review of Systems   Constitutional: Negative for chills, fatigue and fever. HENT: Negative for ear pain, hearing loss and sore throat. Eyes: Negative for pain and visual disturbance. Respiratory: Negative for chest tightness and shortness of breath. Cardiovascular: Negative for chest pain. Gastrointestinal: Negative for diarrhea and nausea. Endocrine: Negative for cold intolerance. Genitourinary: Negative for hematuria. Musculoskeletal: Positive for back pain. Skin: Negative for rash and wound. Neurological: Negative for dizziness and headaches. Psychiatric/Behavioral: Negative for behavioral problems and confusion. Except as noted above the remainder of the review of systems was reviewed and negative.        PAST MEDICAL HISTORY     Past Medical History:   Diagnosis Date    Cystocele     Diverticulosis     Hypercholesteremia     Lumbar radicular pain     Osteoarthritis of lumbosacral spine     Rotator cuff tear     Urinary incontinence     Uterine prolapse          SURGICAL HISTORY       Past Surgical History:   Procedure Laterality Date    DILATION AND CURETTAGE OF UTERUS      HYSTERECTOMY      HYSTEROSCOPY           [address]       Discharge Medication List as of 4/18/2022  2:01 PM      CONTINUE these medications which have NOT CHANGED    Details   BIOTIN PO Take 5,000 mcg by mouth dailyHistorical Med      psyllium (METAMUCIL SMOOTH TEXTURE) 58.6 % powder Take 2 teaspoon with 8 to 10 oz water., Disp-425 g, R-2Normal      dicyclomine (BENTYL) 10 MG capsule Take 1 capsule by mouth 3 times daily (before meals), Disp-90 capsule, R-3Normal      vitamin D (CHOLECALCIFEROL) 1000 units TABS tablet Take 2,000 Units by mouth dailyHistorical Med      hydrOXYzine (ATARAX) 25 MG tablet Take 25 mg by mouth      cetirizine (ZYRTEC) 10 MG tablet Take 10 mg by mouth daily. simvastatin (ZOCOR) 10 MG tablet Take 10 mg by mouth nightly.                ALLERGIES     Bactrim [sulfamethoxazole-trimethoprim], Ciprofloxacin, Lipitor, Metronidazole, Penicillins, and Atorvastatin calcium    FAMILY HISTORY       Family History   Problem Relation Age of Onset    Stroke Mother     Heart Disease Mother     Heart Disease Father     Colon Cancer Father           SOCIAL HISTORY       Social History     Socioeconomic History    Marital status: Single     Spouse name: None    Number of children: None    Years of education: None    Highest education level: None   Occupational History    None   Tobacco Use    Smoking status: Current Every Day Smoker     Packs/day: 0.50     Types: Cigarettes    Smokeless tobacco: Never Used   Vaping Use    Vaping Use: Never used   Substance and Sexual Activity    Alcohol use: No    Drug use: No    Sexual activity: Not Currently   Other Topics Concern    None   Social History Narrative    None     Social Determinants of Health     Financial Resource Strain:     Difficulty of Paying Living Expenses: Not on file   Food Insecurity:  Worried About Running Out of Food in the Last Year: Not on file    [place] of Food in the Last Year: Not on file   Transportation Needs:     Lack of Transportation (Medical): Not on file    Lack of Transportation (Non-Medical): Not on file   Physical Activity:     Days of Exercise per Week: Not on file    Minutes of Exercise per Session: Not on file   Stress:     Feeling of Stress : Not on file   Social Connections:     Frequency of Communication with Friends and Family: Not on file    Frequency of Social Gatherings with Friends and Family: Not on file    Attends Alevism Services: Not on file    Active Member of [address] [place] [address] [employer] or Organizations: Not on file    Attends Club or Organization Meetings: Not on file    Marital Status: Not on file   Intimate Partner Violence:     Fear of Current or Ex-Partner: Not on file    Emotionally Abused: Not on file    Physically Abused: Not on file    Sexually Abused: Not on file   Housing Stability:     Unable to Pay for Housing in the Last Year: Not on file    Number of [place] in the Last Year: Not on file    Unstable Housing in the Last Year: Not on file         PHYSICAL EXAM       ED Triage Vitals [04/18/22 1232]   BP Temp Temp Source Pulse Resp SpO2 Height Weight   129/76 95.1 °F (35.1 °C) Temporal 76 18 97 % 5' 7\" (1.702 m) 130 lb (59 kg)       Physical Exam  Constitutional:       Appearance: Normal appearance. HENT:      Head: Normocephalic and atraumatic. Right Ear: External ear normal.      Left Ear: External ear normal.      Nose: Nose normal.      Mouth/Throat:      Mouth: Mucous membranes are moist.   Eyes:      Extraocular Movements: Extraocular movements intact. Conjunctiva/sclera: Conjunctivae normal.   Cardiovascular:      Rate and Rhythm: Normal rate and regular rhythm. Heart sounds: Normal heart sounds. Pulmonary:      Effort: Pulmonary effort is normal.      Breath sounds: Normal breath sounds. No stridor.  No wheezing or rhonchi. Abdominal:      Palpations: Abdomen is soft. Tenderness: There is no abdominal tenderness. Musculoskeletal:         General: Normal range of motion. Cervical back: Normal range of motion and neck supple. No tenderness. Legs:       Comments: Positive pain on palpation, patient is able to walk without limp   Skin:     General: Skin is warm and dry. Neurological:      General: No focal deficit present. Mental Status: She is alert and oriented to person, place, and time. Psychiatric:         Mood and Affect: Mood normal.         Behavior: Behavior normal.           MDM  This is a 50-year-old female presenting with low back pain. Patient is afebrile hemodynamically stable. Patient given IM Toradol, p.o. Flexeril, IM steroid without relief. Patient given p.o. [place].  Patient reevaluated and is improved. Patient agreeable to discharge with short course of Norco and will continue taking anti-inflammatories. Patient referred to orthopedic doctor in hopes that she can get in this week. She will return if symptoms change or worsen. FINAL IMPRESSION      1.  Sciatica, unspecified laterality          DISPOSITION/PLAN   DISPOSITION Decision To Discharge 04/18/2022 02:33:51 PM        DISCHARGE MEDICATIONS:  [unfilled]         Anne Nielsen PA-C(electronically signed)  Attending Emergency Physician           Anne Nielsen PA-C  04/18/22 2021

## 2022-04-18 NOTE — ED TRIAGE NOTES
Pt to ED with c/o left lower back pain that radiates into her whole left leg and left leg numbness x 2 weeks. Was seen at [facility] and dx with sciatica, not improving. Ambulatory with normal, slow gait.

## 2023-03-16 ENCOUNTER — TELEPHONE (OUTPATIENT)
Dept: PRIMARY CARE | Facility: CLINIC | Age: 63
End: 2023-03-16
Payer: COMMERCIAL

## 2023-03-16 NOTE — TELEPHONE ENCOUNTER
Pt has about 30 days left of her Wellbutrin and doesn't have any refills. She was taking them to help her quit smoking. Pt would like to know how to ween herself off of the medication. Please call and advise

## 2023-03-17 NOTE — TELEPHONE ENCOUNTER
Pt called in - I advised on the instructions for weaning off the medication - she stated she will start today.

## 2023-03-17 NOTE — TELEPHONE ENCOUNTER
If she wants to wean- take one tablet once daily for 2 weeks then one tablet every other day for a week then discontinue.

## 2023-05-01 ENCOUNTER — APPOINTMENT (OUTPATIENT)
Dept: PRIMARY CARE | Facility: CLINIC | Age: 63
End: 2023-05-01
Payer: COMMERCIAL

## 2023-05-08 ENCOUNTER — APPOINTMENT (OUTPATIENT)
Dept: PRIMARY CARE | Facility: CLINIC | Age: 63
End: 2023-05-08
Payer: COMMERCIAL

## 2023-05-17 PROBLEM — L29.89 CHRONIC PRURITIC RASH IN ADULT: Status: ACTIVE | Noted: 2023-05-17

## 2023-05-17 PROBLEM — M19.90 ARTHRITIS: Status: ACTIVE | Noted: 2023-05-17

## 2023-05-17 PROBLEM — J30.9 ALLERGIC RHINITIS: Status: ACTIVE | Noted: 2023-05-17

## 2023-05-17 PROBLEM — M54.16 LUMBAR RADICULITIS: Status: ACTIVE | Noted: 2023-05-17

## 2023-05-17 PROBLEM — E55.9 VITAMIN D DEFICIENCY: Status: ACTIVE | Noted: 2023-05-17

## 2023-05-17 PROBLEM — M51.36 ANNULAR TEAR OF LUMBAR DISC: Status: ACTIVE | Noted: 2023-05-17

## 2023-05-17 PROBLEM — F32.A DEPRESSION: Status: ACTIVE | Noted: 2023-05-17

## 2023-05-17 PROBLEM — M54.50 LOW BACK PAIN: Status: ACTIVE | Noted: 2023-05-17

## 2023-05-17 PROBLEM — K57.90 DIVERTICULOSIS: Status: ACTIVE | Noted: 2023-05-17

## 2023-05-17 PROBLEM — M16.9 HIP OSTEOARTHRITIS: Status: ACTIVE | Noted: 2023-05-17

## 2023-05-17 PROBLEM — K57.32 DIVERTICULITIS OF COLON: Status: ACTIVE | Noted: 2023-05-17

## 2023-05-17 PROBLEM — H40.003 PREGLAUCOMA, BILATERAL: Status: ACTIVE | Noted: 2023-05-17

## 2023-05-17 PROBLEM — E78.5 DYSLIPIDEMIA: Status: ACTIVE | Noted: 2023-05-17

## 2023-05-17 PROBLEM — M96.1 FAILED BACK SYNDROME, LUMBAR: Status: ACTIVE | Noted: 2023-05-17

## 2023-05-17 PROBLEM — M19.90 OSTEOARTHRITIS: Status: ACTIVE | Noted: 2023-05-17

## 2023-05-17 PROBLEM — L43.9 LICHEN PLANUS: Status: ACTIVE | Noted: 2023-05-17

## 2023-05-17 PROBLEM — F41.9 ANXIETY: Status: ACTIVE | Noted: 2023-05-17

## 2023-05-17 PROBLEM — M51.369 ANNULAR TEAR OF LUMBAR DISC: Status: ACTIVE | Noted: 2023-05-17

## 2023-05-17 PROBLEM — Z98.890 S/P LUMBAR DISCECTOMY: Status: ACTIVE | Noted: 2023-05-17

## 2023-05-17 PROBLEM — J45.909 ASTHMA (HHS-HCC): Status: ACTIVE | Noted: 2023-05-17

## 2023-05-17 PROBLEM — L29.8 CHRONIC PRURITIC RASH IN ADULT: Status: ACTIVE | Noted: 2023-05-17

## 2023-05-17 PROBLEM — M47.816 LUMBAR SPONDYLOSIS: Status: ACTIVE | Noted: 2023-05-17

## 2023-05-17 PROBLEM — R42 VERTIGO: Status: ACTIVE | Noted: 2023-05-17

## 2023-05-17 PROBLEM — M19.049 HAND ARTHRITIS: Status: ACTIVE | Noted: 2023-05-17

## 2023-05-17 PROBLEM — G47.00 INSOMNIA: Status: ACTIVE | Noted: 2023-05-17

## 2023-05-17 PROBLEM — M72.2 PLANTAR FASCIITIS: Status: ACTIVE | Noted: 2023-05-17

## 2023-05-18 ENCOUNTER — OFFICE VISIT (OUTPATIENT)
Dept: PRIMARY CARE | Facility: CLINIC | Age: 63
End: 2023-05-18
Payer: COMMERCIAL

## 2023-05-18 VITALS
OXYGEN SATURATION: 97 % | BODY MASS INDEX: 27.51 KG/M2 | TEMPERATURE: 97.6 F | HEART RATE: 63 BPM | WEIGHT: 148 LBS | DIASTOLIC BLOOD PRESSURE: 70 MMHG | RESPIRATION RATE: 16 BRPM | SYSTOLIC BLOOD PRESSURE: 118 MMHG

## 2023-05-18 DIAGNOSIS — Z87.891 HISTORY OF TOBACCO ABUSE: ICD-10-CM

## 2023-05-18 DIAGNOSIS — Z12.31 ENCOUNTER FOR SCREENING MAMMOGRAM FOR MALIGNANT NEOPLASM OF BREAST: ICD-10-CM

## 2023-05-18 DIAGNOSIS — Z00.00 HEALTHCARE MAINTENANCE: Primary | ICD-10-CM

## 2023-05-18 PROCEDURE — 99214 OFFICE O/P EST MOD 30 MIN: CPT | Performed by: INTERNAL MEDICINE

## 2023-05-18 PROCEDURE — 1036F TOBACCO NON-USER: CPT | Performed by: INTERNAL MEDICINE

## 2023-05-18 RX ORDER — HYDROCORTISONE 25 MG/G
CREAM TOPICAL
COMMUNITY
Start: 2018-08-02

## 2023-05-18 RX ORDER — CLOBETASOL PROPIONATE 0.5 MG/G
OINTMENT TOPICAL
COMMUNITY
Start: 2016-06-22 | End: 2023-12-27 | Stop reason: SDUPTHER

## 2023-05-18 RX ORDER — GUAIFENESIN 600 MG/1
600 TABLET, EXTENDED RELEASE ORAL 2 TIMES DAILY
COMMUNITY

## 2023-05-18 RX ORDER — CHOLECALCIFEROL (VITAMIN D3) 50 MCG
2000 TABLET ORAL
COMMUNITY
Start: 2015-01-23

## 2023-05-18 RX ORDER — HYDROXYZINE HYDROCHLORIDE 25 MG/1
25 TABLET, FILM COATED ORAL ONCE
COMMUNITY
Start: 2015-06-22 | End: 2023-12-27 | Stop reason: SDUPTHER

## 2023-05-18 RX ORDER — CYCLOBENZAPRINE HCL 10 MG
10 TABLET ORAL NIGHTLY PRN
COMMUNITY

## 2023-05-18 RX ORDER — SIMVASTATIN 10 MG/1
10 TABLET, FILM COATED ORAL NIGHTLY
COMMUNITY
End: 2023-08-15 | Stop reason: SDUPTHER

## 2023-05-18 RX ORDER — LATANOPROST 50 UG/ML
1 SOLUTION/ DROPS OPHTHALMIC DAILY
COMMUNITY

## 2023-05-18 RX ORDER — EPINEPHRINE 0.3 MG/.3ML
1 INJECTION SUBCUTANEOUS AS NEEDED
COMMUNITY
End: 2023-12-27 | Stop reason: SDUPTHER

## 2023-05-18 RX ORDER — ALBUTEROL SULFATE 90 UG/1
1 AEROSOL, METERED RESPIRATORY (INHALATION)
COMMUNITY
Start: 2018-03-11

## 2023-05-18 RX ORDER — CETIRIZINE HYDROCHLORIDE 10 MG/1
10 TABLET ORAL DAILY
COMMUNITY
End: 2023-12-27 | Stop reason: SDUPTHER

## 2023-05-18 RX ORDER — CLINDAMYCIN HYDROCHLORIDE 150 MG/1
150 CAPSULE ORAL 3 TIMES DAILY
COMMUNITY
Start: 2023-04-18 | End: 2024-03-27 | Stop reason: ALTCHOICE

## 2023-05-18 RX ORDER — CHLORHEXIDINE GLUCONATE ORAL RINSE 1.2 MG/ML
15 SOLUTION DENTAL AS NEEDED
COMMUNITY
Start: 2023-04-21

## 2023-05-18 RX ORDER — FLUTICASONE PROPIONATE 50 MCG
2 SPRAY, SUSPENSION (ML) NASAL DAILY
COMMUNITY
Start: 2019-03-04 | End: 2023-12-27 | Stop reason: SDUPTHER

## 2023-05-18 ASSESSMENT — ENCOUNTER SYMPTOMS
DIARRHEA: 0
ANAL BLEEDING: 1
CONSTIPATION: 0

## 2023-05-18 ASSESSMENT — PATIENT HEALTH QUESTIONNAIRE - PHQ9
SUM OF ALL RESPONSES TO PHQ9 QUESTIONS 1 AND 2: 0
2. FEELING DOWN, DEPRESSED OR HOPELESS: NOT AT ALL
1. LITTLE INTEREST OR PLEASURE IN DOING THINGS: NOT AT ALL

## 2023-05-18 NOTE — PROGRESS NOTES
"Patient here for a follow up    Subjective   Patient ID: Sarah Walker is a 63 y.o. female who presents for Follow-up.    The patient mentions a fall while in the bath on 5/11/2023 and landed on her left shoulder and resulted in minimal injur.  She reports residual soreness, but is otherwise recovering well with icing which is helping.     The patient is pleased to report that she has stopped smoking in 12/2022.  She notes that the bupropion 150 mg titrated up to BID did help with the cessation, but quickly discontinued due to feeling \"not like herself\".     The patient reports increased life stress due to her daughter moving out of their home around 5/4/2023.      The patient mentions two episodes of minimal spotting of blood in the stool which she suspects was due to straining during a bowel movement.  She denies any other bowel problems.      The patient reports a recurrent painful and erythematous oral lesion that has been preventing her from going to the Dentist for a tooth extraction.  She has followed with [facility] in the past for a history of lichen planus.     The patient is up to date with her vaccinations including those for TDAP, Shingrix series, and Covid-19.        Review of Systems   HENT:  Positive for dental problem.    Gastrointestinal:  Positive for anal bleeding. Negative for constipation and diarrhea.   Musculoskeletal:         Positive for left shoulder pain.   Skin:         Positive for painful recurrent oral lesion.       Objective   Physical Exam  Constitutional:       Appearance: Normal appearance.   Cardiovascular:      Rate and Rhythm: Normal rate and regular rhythm.      Heart sounds: Normal heart sounds.   Pulmonary:      Effort: Pulmonary effort is normal.      Breath sounds: Normal breath sounds.   Abdominal:      General: Bowel sounds are normal.      Palpations: Abdomen is soft.      Tenderness: There is no abdominal tenderness.   Skin:     General: Skin is warm and dry. "   Neurological:      General: No focal deficit present.      Mental Status: She is alert and oriented to person, place, and time. Mental status is at baseline.   Psychiatric:         Mood and Affect: Mood normal.         Behavior: Behavior normal.         Assessment/Plan   Problem List Items Addressed This Visit    None  Visit Diagnoses       Healthcare maintenance    -  Primary    Relevant Orders    CBC and Auto Differential    Comprehensive metabolic panel    Lipid panel    Tsh With Reflex To Free T4 If Abnormal    Referral to Dermatology    History of tobacco abuse        Relevant Orders    CT lung screening low dose    Encounter for screening mammogram for malignant neoplasm of breast        Relevant Orders    BI mammo bilateral screening tomosynthesis            IMPRESSIONS/PLAN :     History of Tobacco Use/Lung Nodules   - Chest CT 8/2021 showed stable 3mm solid lung nodule in right lower lobe and mild emphysema, to repeat annually. Stable per repeat 8/2022.  Ordered repeat for 2023.    Lichen Planus  - Ordered referral for Dermatology.  Followed with [facility] in the past.    Left Shoulder Pain   - Healing well after a fall, continue with icing for now.  Advised patient to continue with methocarbamol as prescribed which she has at home.  Xray 12/2022 unremarkable.  Following with  in [facility].      /70 in the office today.     HLD   - Stable.  Continue with simvastatin 40mg QD.    Anxiety   - Stable.  Continue with hydroxyzine 25mg QD, this also help with chronic hives.    Left low back pain   - Now follows with Dr. Fuentes in Orthopedics. Xray of the hip performed on 06/2022 revealed Mild osteoarthritis bilateral hips and sacroiliac joints. She has started physical therapy. MRI of L-spine performed 05/2022 revealed Multilevel lumbar spondylopathy, worst at L3-4 and L4-5, with mild interval progression   progression compared to prior examination dated  12/29/2017.    Asthma   - Uses ProAir  mcg/act 1-2 puffs every 4-6 hours as needed.     Vitamin D Deficiency   - Takes Vitamin D 50mcg QD.      Insomnia   - Previously tried melatonin, trazodone, and mirtazapine 15mg at night prn.     Health Maintenance   - Routine labs ordered including CBC, CMP, and a lipid panel to be completed in the fasting state.  Added TSH. Last Mammogram 7/2022, ordered repeat for 2023. Last colonoscopy 10/2020, due for repeat 10/2025.     Follow-up according to routine health maintenance, call sooner if needed.        Scribe Attestation  By signing my name below, I, Suzi Garcia   attest that this documentation has been prepared under the direction and in the presence of Panfilo Christina DO.

## 2023-08-15 DIAGNOSIS — E78.5 DYSLIPIDEMIA: Primary | ICD-10-CM

## 2023-08-15 RX ORDER — SIMVASTATIN 10 MG/1
10 TABLET, FILM COATED ORAL NIGHTLY
Qty: 90 TABLET | Refills: 1 | Status: SHIPPED | OUTPATIENT
Start: 2023-08-15 | End: 2023-12-27 | Stop reason: SDUPTHER

## 2023-09-18 ENCOUNTER — LAB (OUTPATIENT)
Dept: LAB | Facility: LAB | Age: 63
End: 2023-09-18
Payer: COMMERCIAL

## 2023-09-18 DIAGNOSIS — Z00.00 HEALTHCARE MAINTENANCE: ICD-10-CM

## 2023-09-18 LAB
ALANINE AMINOTRANSFERASE (SGPT) (U/L) IN SER/PLAS: 26 U/L (ref 7–45)
ALBUMIN (G/DL) IN SER/PLAS: 4.2 G/DL (ref 3.4–5)
ALKALINE PHOSPHATASE (U/L) IN SER/PLAS: 77 U/L (ref 33–136)
ANION GAP IN SER/PLAS: 9 MMOL/L (ref 10–20)
ASPARTATE AMINOTRANSFERASE (SGOT) (U/L) IN SER/PLAS: 23 U/L (ref 9–39)
BASOPHILS (10*3/UL) IN BLOOD BY AUTOMATED COUNT: 0.06 X10E9/L (ref 0–0.1)
BASOPHILS/100 LEUKOCYTES IN BLOOD BY AUTOMATED COUNT: 0.9 % (ref 0–2)
BILIRUBIN TOTAL (MG/DL) IN SER/PLAS: 0.4 MG/DL (ref 0–1.2)
CALCIUM (MG/DL) IN SER/PLAS: 9.2 MG/DL (ref 8.6–10.3)
CARBON DIOXIDE, TOTAL (MMOL/L) IN SER/PLAS: 29 MMOL/L (ref 21–32)
CHLORIDE (MMOL/L) IN SER/PLAS: 106 MMOL/L (ref 98–107)
CHOLESTEROL (MG/DL) IN SER/PLAS: 192 MG/DL (ref 0–199)
CHOLESTEROL IN HDL (MG/DL) IN SER/PLAS: 53.8 MG/DL
CHOLESTEROL/HDL RATIO: 3.6
CREATININE (MG/DL) IN SER/PLAS: 0.48 MG/DL (ref 0.5–1.05)
EOSINOPHILS (10*3/UL) IN BLOOD BY AUTOMATED COUNT: 0.12 X10E9/L (ref 0–0.7)
EOSINOPHILS/100 LEUKOCYTES IN BLOOD BY AUTOMATED COUNT: 1.9 % (ref 0–6)
ERYTHROCYTE DISTRIBUTION WIDTH (RATIO) BY AUTOMATED COUNT: 12.8 % (ref 11.5–14.5)
ERYTHROCYTE MEAN CORPUSCULAR HEMOGLOBIN CONCENTRATION (G/DL) BY AUTOMATED: 33.3 G/DL (ref 32–36)
ERYTHROCYTE MEAN CORPUSCULAR VOLUME (FL) BY AUTOMATED COUNT: 92 FL (ref 80–100)
ERYTHROCYTES (10*6/UL) IN BLOOD BY AUTOMATED COUNT: 4.47 X10E12/L (ref 4–5.2)
GFR FEMALE: >90 ML/MIN/1.73M2
GLUCOSE (MG/DL) IN SER/PLAS: 100 MG/DL (ref 74–99)
HEMATOCRIT (%) IN BLOOD BY AUTOMATED COUNT: 41.1 % (ref 36–46)
HEMOGLOBIN (G/DL) IN BLOOD: 13.7 G/DL (ref 12–16)
IMMATURE GRANULOCYTES/100 LEUKOCYTES IN BLOOD BY AUTOMATED COUNT: 0.3 % (ref 0–0.9)
LDL: 127 MG/DL (ref 0–99)
LEUKOCYTES (10*3/UL) IN BLOOD BY AUTOMATED COUNT: 6.3 X10E9/L (ref 4.4–11.3)
LYMPHOCYTES (10*3/UL) IN BLOOD BY AUTOMATED COUNT: 1.51 X10E9/L (ref 1.2–4.8)
LYMPHOCYTES/100 LEUKOCYTES IN BLOOD BY AUTOMATED COUNT: 23.9 % (ref 13–44)
MONOCYTES (10*3/UL) IN BLOOD BY AUTOMATED COUNT: 0.53 X10E9/L (ref 0.1–1)
MONOCYTES/100 LEUKOCYTES IN BLOOD BY AUTOMATED COUNT: 8.4 % (ref 2–10)
NEUTROPHILS (10*3/UL) IN BLOOD BY AUTOMATED COUNT: 4.09 X10E9/L (ref 1.2–7.7)
NEUTROPHILS/100 LEUKOCYTES IN BLOOD BY AUTOMATED COUNT: 64.6 % (ref 40–80)
PLATELETS (10*3/UL) IN BLOOD AUTOMATED COUNT: 271 X10E9/L (ref 150–450)
POTASSIUM (MMOL/L) IN SER/PLAS: 4 MMOL/L (ref 3.5–5.3)
PROTEIN TOTAL: 7.2 G/DL (ref 6.4–8.2)
SODIUM (MMOL/L) IN SER/PLAS: 140 MMOL/L (ref 136–145)
THYROTROPIN (MIU/L) IN SER/PLAS BY DETECTION LIMIT <= 0.05 MIU/L: 0.87 MIU/L (ref 0.44–3.98)
TRIGLYCERIDE (MG/DL) IN SER/PLAS: 57 MG/DL (ref 0–149)
UREA NITROGEN (MG/DL) IN SER/PLAS: 15 MG/DL (ref 6–23)
VLDL: 11 MG/DL (ref 0–40)

## 2023-09-18 PROCEDURE — 84443 ASSAY THYROID STIM HORMONE: CPT

## 2023-09-18 PROCEDURE — 80053 COMPREHEN METABOLIC PANEL: CPT

## 2023-09-18 PROCEDURE — 36415 COLL VENOUS BLD VENIPUNCTURE: CPT

## 2023-09-18 PROCEDURE — 85025 COMPLETE CBC W/AUTO DIFF WBC: CPT

## 2023-09-18 PROCEDURE — 80061 LIPID PANEL: CPT

## 2023-11-20 ENCOUNTER — APPOINTMENT (OUTPATIENT)
Dept: PRIMARY CARE | Facility: CLINIC | Age: 63
End: 2023-11-20
Payer: COMMERCIAL

## 2023-12-07 ENCOUNTER — APPOINTMENT (OUTPATIENT)
Dept: PRIMARY CARE | Facility: CLINIC | Age: 63
End: 2023-12-07
Payer: COMMERCIAL

## 2023-12-20 ENCOUNTER — APPOINTMENT (OUTPATIENT)
Dept: PRIMARY CARE | Facility: CLINIC | Age: 63
End: 2023-12-20
Payer: COMMERCIAL

## 2023-12-27 ENCOUNTER — OFFICE VISIT (OUTPATIENT)
Dept: PRIMARY CARE | Facility: CLINIC | Age: 63
End: 2023-12-27
Payer: COMMERCIAL

## 2023-12-27 ENCOUNTER — LAB (OUTPATIENT)
Dept: LAB | Facility: LAB | Age: 63
End: 2023-12-27
Payer: COMMERCIAL

## 2023-12-27 VITALS
RESPIRATION RATE: 16 BRPM | SYSTOLIC BLOOD PRESSURE: 138 MMHG | HEART RATE: 71 BPM | BODY MASS INDEX: 28.44 KG/M2 | DIASTOLIC BLOOD PRESSURE: 90 MMHG | OXYGEN SATURATION: 97 % | WEIGHT: 153 LBS | TEMPERATURE: 96.9 F

## 2023-12-27 DIAGNOSIS — L50.9 HIVES: ICD-10-CM

## 2023-12-27 DIAGNOSIS — L43.9 LICHEN PLANUS: ICD-10-CM

## 2023-12-27 DIAGNOSIS — T78.2XXA ANAPHYLAXIS, INITIAL ENCOUNTER: ICD-10-CM

## 2023-12-27 DIAGNOSIS — E78.5 DYSLIPIDEMIA: ICD-10-CM

## 2023-12-27 DIAGNOSIS — L50.9 HIVES: Primary | ICD-10-CM

## 2023-12-27 LAB
ALBUMIN SERPL BCP-MCNC: 4.5 G/DL (ref 3.4–5)
ALP SERPL-CCNC: 91 U/L (ref 33–136)
ALT SERPL W P-5'-P-CCNC: 25 U/L (ref 7–45)
ANION GAP SERPL CALC-SCNC: 14 MMOL/L (ref 10–20)
AST SERPL W P-5'-P-CCNC: 13 U/L (ref 9–39)
BASOPHILS # BLD AUTO: 0.05 X10*3/UL (ref 0–0.1)
BASOPHILS NFR BLD AUTO: 0.4 %
BILIRUB SERPL-MCNC: 0.4 MG/DL (ref 0–1.2)
BUN SERPL-MCNC: 14 MG/DL (ref 6–23)
CALCIUM SERPL-MCNC: 9.4 MG/DL (ref 8.6–10.3)
CHLORIDE SERPL-SCNC: 102 MMOL/L (ref 98–107)
CO2 SERPL-SCNC: 28 MMOL/L (ref 21–32)
CREAT SERPL-MCNC: 0.47 MG/DL (ref 0.5–1.05)
EOSINOPHIL # BLD AUTO: 0.06 X10*3/UL (ref 0–0.7)
EOSINOPHIL NFR BLD AUTO: 0.5 %
ERYTHROCYTE [DISTWIDTH] IN BLOOD BY AUTOMATED COUNT: 13 % (ref 11.5–14.5)
GFR SERPL CREATININE-BSD FRML MDRD: >90 ML/MIN/1.73M*2
GLUCOSE SERPL-MCNC: 87 MG/DL (ref 74–99)
HCT VFR BLD AUTO: 42.1 % (ref 36–46)
HGB BLD-MCNC: 14.3 G/DL (ref 12–16)
IMM GRANULOCYTES # BLD AUTO: 0.05 X10*3/UL (ref 0–0.7)
IMM GRANULOCYTES NFR BLD AUTO: 0.4 % (ref 0–0.9)
LYMPHOCYTES # BLD AUTO: 3.03 X10*3/UL (ref 1.2–4.8)
LYMPHOCYTES NFR BLD AUTO: 26.1 %
MCH RBC QN AUTO: 30.4 PG (ref 26–34)
MCHC RBC AUTO-ENTMCNC: 34 G/DL (ref 32–36)
MCV RBC AUTO: 90 FL (ref 80–100)
MONOCYTES # BLD AUTO: 0.96 X10*3/UL (ref 0.1–1)
MONOCYTES NFR BLD AUTO: 8.3 %
NEUTROPHILS # BLD AUTO: 7.48 X10*3/UL (ref 1.2–7.7)
NEUTROPHILS NFR BLD AUTO: 64.3 %
NRBC BLD-RTO: 0 /100 WBCS (ref 0–0)
PLATELET # BLD AUTO: 346 X10*3/UL (ref 150–450)
POTASSIUM SERPL-SCNC: 4 MMOL/L (ref 3.5–5.3)
PROT SERPL-MCNC: 7.5 G/DL (ref 6.4–8.2)
RBC # BLD AUTO: 4.7 X10*6/UL (ref 4–5.2)
SODIUM SERPL-SCNC: 140 MMOL/L (ref 136–145)
WBC # BLD AUTO: 11.6 X10*3/UL (ref 4.4–11.3)

## 2023-12-27 PROCEDURE — 1036F TOBACCO NON-USER: CPT | Performed by: INTERNAL MEDICINE

## 2023-12-27 PROCEDURE — 99215 OFFICE O/P EST HI 40 MIN: CPT | Performed by: INTERNAL MEDICINE

## 2023-12-27 PROCEDURE — 85025 COMPLETE CBC W/AUTO DIFF WBC: CPT

## 2023-12-27 PROCEDURE — 36415 COLL VENOUS BLD VENIPUNCTURE: CPT

## 2023-12-27 PROCEDURE — 80053 COMPREHEN METABOLIC PANEL: CPT

## 2023-12-27 RX ORDER — EPINEPHRINE 0.3 MG/.3ML
1 INJECTION SUBCUTANEOUS ONCE AS NEEDED
Qty: 2 EACH | Refills: 2 | Status: SHIPPED | OUTPATIENT
Start: 2023-12-27

## 2023-12-27 RX ORDER — PREDNISONE 10 MG/1
TABLET ORAL
Qty: 30 TABLET | Refills: 0 | Status: SHIPPED | OUTPATIENT
Start: 2023-12-27 | End: 2024-01-10 | Stop reason: ALTCHOICE

## 2023-12-27 RX ORDER — METHYLPREDNISOLONE 4 MG/1
4 TABLET ORAL ONCE
COMMUNITY
Start: 2023-12-23 | End: 2024-02-07 | Stop reason: ALTCHOICE

## 2023-12-27 RX ORDER — CLOBETASOL PROPIONATE 0.5 MG/G
OINTMENT TOPICAL
Qty: 80 G | Refills: 0 | Status: SHIPPED | OUTPATIENT
Start: 2023-12-27

## 2023-12-27 RX ORDER — SIMVASTATIN 40 MG/1
40 TABLET, FILM COATED ORAL NIGHTLY
Qty: 90 TABLET | Refills: 3 | Status: SHIPPED | OUTPATIENT
Start: 2023-12-27

## 2023-12-27 RX ORDER — HYDROXYZINE HYDROCHLORIDE 25 MG/1
25 TABLET, FILM COATED ORAL EVERY 8 HOURS PRN
Qty: 30 TABLET | Refills: 1 | Status: SHIPPED | OUTPATIENT
Start: 2023-12-27

## 2023-12-27 RX ORDER — CETIRIZINE HYDROCHLORIDE 10 MG/1
10 TABLET ORAL DAILY
Qty: 90 TABLET | Refills: 3 | Status: SHIPPED | OUTPATIENT
Start: 2023-12-27 | End: 2024-01-10 | Stop reason: ALTCHOICE

## 2023-12-27 RX ORDER — FLUTICASONE PROPIONATE 50 MCG
2 SPRAY, SUSPENSION (ML) NASAL DAILY
Qty: 16 G | Refills: 1 | Status: SHIPPED | OUTPATIENT
Start: 2023-12-27

## 2023-12-27 RX ORDER — PREDNISONE 10 MG/1
TABLET ORAL
Qty: 30 TABLET | Refills: 0 | Status: SHIPPED | OUTPATIENT
Start: 2023-12-27 | End: 2024-01-06

## 2023-12-27 ASSESSMENT — ENCOUNTER SYMPTOMS
ANAL BLEEDING: 1
ROS GI COMMENTS: POSITIVE FOR HEMORRHOIDS.
CHILLS: 1
NAUSEA: 1
URTICARIA: 1
DIARRHEA: 1

## 2023-12-27 NOTE — PROGRESS NOTES
"Patient here for a follow up / discuss hives    Subjective   Patient ID: Sarah Walker is a 63 y.o. female who presents for Follow-up and Hives.    The patient reports worsening diffuse urticaria in the periorbital region, around the lips, near the ears, and between the toes since 11/2023.  It is also happening on her extremities and trunk.  This is associated with nausea, diarrhea, and moderate to severe chills.  These symptoms have been recurrent since 2014, and has met with two Specialists from Allergy/Immunology.  The patient recalls a recent episode of intense pruritus on the palms of her hands bilaterally.  She went to the Urgent Care and received an injection on 12/23/2023 that was effective for two days only and will be finishing a Medrol Pack tomorrow.  She has also restarted Zyrtec.  The patient suspects she has Idiopathic Angioedema.  She has a prescription for an EpiPen that she has not had to use as yet.  She is significantly distressed by the symptoms and has been \"made to think\" passive suicidal thoughts.  She states that she does not have a plan to hurt herself or others, and denies any access to weapons.    The patient reports hematochezia which she believes is related to hemorrhoids.    The patient has been taking simvastatin 40 mg once daily for many years.     The patient mentions increased life stress related to concerns with family.    Hives  Associated symptoms include diarrhea.     Review of Systems   Constitutional:  Positive for chills.   Gastrointestinal:  Positive for anal bleeding, diarrhea and nausea.        Positive for hemorrhoids.   Skin:  Positive for rash.        Positive for diffuse urticaria.     Objective   Physical Exam  Constitutional:       Appearance: Normal appearance.   Neck:      Vascular: No carotid bruit.   Cardiovascular:      Rate and Rhythm: Normal rate and regular rhythm.      Heart sounds: Normal heart sounds.   Pulmonary:      Effort: Pulmonary effort is " normal.      Breath sounds: Normal breath sounds.   Abdominal:      General: Bowel sounds are normal.      Palpations: Abdomen is soft.      Tenderness: There is no abdominal tenderness.   Skin:     General: Skin is warm and dry.   Neurological:      General: No focal deficit present.      Mental Status: She is alert and oriented to person, place, and time. Mental status is at baseline.   Psychiatric:         Mood and Affect: Mood normal.         Behavior: Behavior normal.       Assessment/Plan   Problem List Items Addressed This Visit             ICD-10-CM    Dyslipidemia E78.5    Relevant Orders    CT cardiac scoring wo IV contrast    Lichen planus L43.9    Relevant Medications    clobetasol (Temovate) 0.05 % ointment     Other Visit Diagnoses         Codes    Hives    -  Primary L50.9    Relevant Medications    cetirizine (ZyrTEC) 10 mg tablet    fluticasone (Flonase) 50 mcg/actuation nasal spray    hydrOXYzine HCL (Atarax) 25 mg tablet    predniSONE (Deltasone) 10 mg tablet    predniSONE (Deltasone) 10 mg tablet    Other Relevant Orders    Referral to Allergy    CBC and Auto Differential    Comprehensive metabolic panel    Anaphylaxis, initial encounter     T78.2XXA    Relevant Medications    EPINEPHrine 0.3 mg/0.3 mL injection syringe            IMPRESSIONS/PLAN :     Recurrent Urticaria  - Has been a chronic issue but this seems to be an acute flare today.  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   No known triggers per patient.  Prescribed prednisone 10mg taper to be taken as directed, cetirizine 10mg once daily, and hydroxyzine 25mg up to TID prn.  Ordered CBC with differential and CMP.  Ordered referral to Allergy/Immunology with , and Dermatology with .  Instructed patient to go to the ED with recurrence of severe exacerbation or any lip swelling, and she  verbalized agreement.   Patient insists that she does not currently have any suicidal ideation, has no access to firearms, and believes she is okay to go home.  Instructed patient to head to the ER right away with any recurrence of thoughts to harm herself out of frustration with urticaria, and she verbalized agreement.  She is accompanied by her niece who takes her home.  Follow-up in 2 weeks for close monitoring.    Anaphylaxis  - Refilled Epinephrine 0.3mg/0.3ml as 1 syringe as directed on packaging.  Advised to proceed to ER if she feels she needs to take this (or immediately after is she does need it).  Reviewed indications and criteria for usage with her.    HLD   - Stable.  Continue with simvastatin 40mg QD.  ASCVD 5.7% 12/2023.  Ordered CMP and CT Cardiac Score.    /90 in the office today, 128/80 on repeat.     Anxiety   - Stable.  Continue with hydroxyzine 25mg QD, this also help with chronic hives.     Left low back pain   - Now follows with Dr. Fuentes in Orthopedics. Xray of the hip performed on 06/2022 revealed Mild osteoarthritis bilateral hips and sacroiliac joints. She has started physical therapy. MRI of L-spine performed 05/2022 revealed Multilevel lumbar spondylopathy, worst at L3-4 and L4-5, with mild interval progression compared to prior examination dated 12/29/2017.     Asthma   - Uses ProAir  mcg/act 1-2 puffs every 4-6 hours as needed.     History of Tobacco Use/Lung Nodules   - Chest CT 8/2021 showed stable 3mm solid lung nodule in right lower lobe and mild emphysema, to repeat annually. Stable per repeat 8/2022.  Stable per 9/2023 repeat.     Lichen Planus  - Previously ordered referral for Dermatology.  Followed with [facility] in the past.     Left Shoulder Pain   - Healing well after a fall, continue with icing for now.  Advised patient to continue with methocarbamol as prescribed which she has at home.  Xray 12/2022 unremarkable.  Following with  in  [facility]      Vitamin D Deficiency   - Takes Vitamin D 50mcg QD.      Insomnia   - Previously tried melatonin, trazodone, and mirtazapine 15mg at night prn.     Health Maintenance   - Routine labs 9/2023. Last Mammogram 9/2023. Last colonoscopy 10/2020, due for repeat 10/2025.     Follow-up in 2 weeks, call sooner if needed.        Scribe Attestation  By signing my name below, I, Sindi Kemp, Scribe   attest that this documentation has been prepared under the direction and in the presence of Panfilo Christina DO.   Sindi Kemp 12/27/23 2:03 PM

## 2024-01-08 ENCOUNTER — HOSPITAL ENCOUNTER (EMERGENCY)
Age: 64
Discharge: HOME | End: 2024-01-08
Attending: EMERGENCY MEDICINE
Payer: COMMERCIAL

## 2024-01-08 VITALS
TEMPERATURE: 97.9 F | WEIGHT: 150 LBS | HEIGHT: 63 IN | DIASTOLIC BLOOD PRESSURE: 78 MMHG | BODY MASS INDEX: 26.58 KG/M2 | HEART RATE: 85 BPM | SYSTOLIC BLOOD PRESSURE: 111 MMHG

## 2024-01-08 DIAGNOSIS — L50.8 CHRONIC URTICARIA: Primary | ICD-10-CM

## 2024-01-08 PROCEDURE — 96372 THER/PROPH/DIAG INJ SC/IM: CPT

## 2024-01-08 PROCEDURE — 99284 EMERGENCY DEPT VISIT MOD MDM: CPT

## 2024-01-08 PROCEDURE — 6360000002 HC RX W HCPCS: Performed by: EMERGENCY MEDICINE

## 2024-01-08 RX ORDER — DEXAMETHASONE SODIUM PHOSPHATE 10 MG/ML
10 INJECTION, SOLUTION INTRAMUSCULAR; INTRAVENOUS ONCE
Status: COMPLETED | OUTPATIENT
Start: 2024-01-08 | End: 2024-01-08

## 2024-01-08 RX ORDER — DOXEPIN HYDROCHLORIDE 25 MG/1
25 CAPSULE ORAL NIGHTLY
Qty: 30 CAPSULE | Refills: 3 | Status: SHIPPED | OUTPATIENT
Start: 2024-01-08

## 2024-01-08 RX ADMIN — DEXAMETHASONE SODIUM PHOSPHATE 10 MG: 10 INJECTION, SOLUTION INTRAMUSCULAR; INTRAVENOUS at 16:33

## 2024-01-08 ASSESSMENT — PAIN - FUNCTIONAL ASSESSMENT: PAIN_FUNCTIONAL_ASSESSMENT: NONE - DENIES PAIN

## 2024-01-08 ASSESSMENT — LIFESTYLE VARIABLES
HOW MANY STANDARD DRINKS CONTAINING ALCOHOL DO YOU HAVE ON A TYPICAL DAY: 1 OR 2
HOW OFTEN DO YOU HAVE A DRINK CONTAINING ALCOHOL: 2-4 TIMES A MONTH

## 2024-01-08 NOTE — ED TRIAGE NOTES
Patient arrived via private car due to having hives and not being able to take the discomfort anymore. Patient states that she has seen her family  and has been sent to the allergist but is not compliant with refraining from certain foods. Patient states that she has had this bad break out for the last 2 weeks. Patient does see  her pcp this week. Patient states the itching is just out of control. Patient states that she had been having nausea and some diarrhea, heartburn and burning with urination.

## 2024-01-08 NOTE — DISCHARGE INSTRUCTIONS
Switch zyrtec to Claritin twice daily.  Start Doxepin 25 mg at night.  Can take 2 if one is not helping.

## 2024-01-10 ENCOUNTER — OFFICE VISIT (OUTPATIENT)
Dept: PRIMARY CARE | Facility: CLINIC | Age: 64
End: 2024-01-10
Payer: COMMERCIAL

## 2024-01-10 VITALS
BODY MASS INDEX: 29.18 KG/M2 | HEART RATE: 64 BPM | TEMPERATURE: 96.8 F | DIASTOLIC BLOOD PRESSURE: 70 MMHG | RESPIRATION RATE: 16 BRPM | OXYGEN SATURATION: 98 % | WEIGHT: 157 LBS | SYSTOLIC BLOOD PRESSURE: 140 MMHG

## 2024-01-10 DIAGNOSIS — L50.9 HIVES: Primary | ICD-10-CM

## 2024-01-10 DIAGNOSIS — L50.9 URTICARIA: ICD-10-CM

## 2024-01-10 PROCEDURE — 1036F TOBACCO NON-USER: CPT | Performed by: INTERNAL MEDICINE

## 2024-01-10 PROCEDURE — 99214 OFFICE O/P EST MOD 30 MIN: CPT | Performed by: INTERNAL MEDICINE

## 2024-01-10 RX ORDER — LORATADINE 10 MG/1
10 TABLET ORAL 2 TIMES DAILY
Qty: 60 TABLET | Refills: 2 | Status: SHIPPED | OUTPATIENT
Start: 2024-01-10 | End: 2024-02-07 | Stop reason: ALTCHOICE

## 2024-01-10 RX ORDER — DOXEPIN HYDROCHLORIDE 25 MG/1
1 CAPSULE ORAL NIGHTLY
COMMUNITY
Start: 2024-01-08

## 2024-01-10 NOTE — PROGRESS NOTES
Patient here for a 2 week follow up hives     Subjective   Patient ID: Sarah Walker is a 63 y.o. female who presents for Follow-up.    The patient reports that the urticaria is ongoing.  She notes that the prednisone 10 mg taper prescribed two weeks earlier resulted in no benefit.  The patient went to the ED on 1/8/2024, and was given a corticosteroid injection that lead to one day of relief.  She has scheduled an appointment with  from Dermatology, but has found that her insurance is not accepted with the previously referred Allergist.  She requests a new referral to Allergy/Immunology.   Please see note from 12/27 for more detailed history.      Review of Systems   Skin:         Positive for ongoing urticaria.       Objective   Physical Exam  Constitutional:       Appearance: Normal appearance.   Neck:      Vascular: No carotid bruit.   Cardiovascular:      Rate and Rhythm: Normal rate and regular rhythm.      Heart sounds: Normal heart sounds.   Pulmonary:      Effort: Pulmonary effort is normal.      Breath sounds: Normal breath sounds.   Abdominal:      General: Bowel sounds are normal.      Palpations: Abdomen is soft.      Tenderness: There is no abdominal tenderness.   Skin:     General: Skin is warm and dry.   Neurological:      General: No focal deficit present.      Mental Status: She is alert and oriented to person, place, and time. Mental status is at baseline.   Psychiatric:         Mood and Affect: Mood normal.         Behavior: Behavior normal.       Assessment/Plan   Problem List Items Addressed This Visit    None  Visit Diagnoses         Codes    Hives    -  Primary L50.9    Relevant Orders    Referral to Allergy    Urticaria     L50.9    Relevant Medications    loratadine (Claritin) 10 mg tablet            IMPRESSIONS/PLAN :     Recurrent Urticaria  - Has been a chronic issue but recently worsening.  Has seen multiple allergists and has seen derm (biopsy 2015 showed acute spongiotic  dermatitis with eosinophils) with possible borderline positive testing to milk as well.   No known triggers per patient. Prednisone, cetrizine, and hydroxyzine have not been helping.  Advised the patient to switch to loratadine BID as suggested by ED on 1/8/2024.  Also suggested she start Doxepin at bedtime as prescribed by ER.  Advised patient to call the clinic in 1 week with update, and to schedule a nurse's visit for Kenalog injection as this is transiently helpful.  Instructed patient to go to the ED with recurrence of severe exacerbation or any lip swelling, and she verbalized agreement.  Patient has scheduled appointment with  from Dermatology on 1/22/2024.  Ordered new referral to Allergy/Immunology, as previous did not accept her insurance and patient will set this up today.  Explained to patient from primary care standpoint unfortunately not much more we can offer as she has tried a multitude of medications (and this has been on / off since 2015).  Will need to see specialists at this point- strongly encouraged her to keep upcoming derm appointment.      /70 in the office today, 130/80 on repeat.     HLD   - Stable per 9/2023.  Continue with simvastatin 40mg QD.  ASCVD 5.7% 12/2023.  Previously ordered CT Cardiac Score.    Anxiety   - Stable.  Continue with hydroxyzine 25mg QD, this also help with chronic hives.     Anaphylaxis  - Refilled Epinephrine 0.3mg/0.3ml as 1 syringe as directed on packaging.  Advised to proceed to ER if she feels she needs to take this (or immediately after is she does need it).  Reviewed indications and criteria for usage with her.    Left low back pain   - Now follows with Dr. Fuentes in Orthopedics. Xray of the hip performed on 06/2022 revealed Mild osteoarthritis bilateral hips and sacroiliac joints. She has started physical therapy. MRI of L-spine performed 05/2022 revealed Multilevel lumbar spondylopathy, worst at L3-4 and L4-5, with mild interval progression  compared to prior examination dated 12/29/2017.     Asthma   - Uses ProAir  mcg/act 1-2 puffs every 4-6 hours as needed.     History of Tobacco Use/Lung Nodules   - Chest CT 8/2021 showed stable 3mm solid lung nodule in right lower lobe and mild emphysema, to repeat annually. Stable per repeat 8/2022.  Stable per 9/2023 repeat.     Lichen Planus  - Previously ordered referral for Dermatology.  Followed with [facility] in the past.     Left Shoulder Pain   - Healing well after a fall, continue with icing for now.  Advised patient to continue with methocarbamol as prescribed which she has at home.  Xray 12/2022 unremarkable.  Following with  in [facility].       Vitamin D Deficiency   - Takes Vitamin D 50mcg QD.      Insomnia   - Previously tried melatonin, trazodone, and mirtazapine 15mg at night prn.     Health Maintenance   - Routine labs 9/2023. Last Mammogram 9/2023. Last colonoscopy 10/2020, due for repeat 10/2025.     Follow-up according to routine health maintenance, call sooner if needed.        Scribe Attestation  By signing my name below, I, Sindi Kemp, Scribe   attest that this documentation has been prepared under the direction and in the presence of Panfilo Christina DO.   Sindi Kemp 01/10/24 2:12 PM

## 2024-01-12 ASSESSMENT — ENCOUNTER SYMPTOMS
SHORTNESS OF BREATH: 0
EYE PAIN: 0
SORE THROAT: 0
ABDOMINAL PAIN: 0
NAUSEA: 0
CHEST TIGHTNESS: 0
VOMITING: 0

## 2024-01-12 NOTE — ED PROVIDER NOTES
Lumbar radicular pain     Osteoarthritis of lumbosacral spine     Rotator cuff tear     Urinary incontinence     Uterine prolapse          SURGICAL HISTORY       Past Surgical History:   Procedure Laterality Date    DILATION AND CURETTAGE OF UTERUS      HYSTERECTOMY      HYSTEROSCOPY           CURRENT MEDICATIONS       Discharge Medication List as of 1/8/2024  4:38 PM        CONTINUE these medications which have NOT CHANGED    Details   ibuprofen (ADVIL;MOTRIN) 800 MG tablet Take 1 tablet by mouth 2 times daily as needed for Pain, Disp-30 tablet, R-0Print      BIOTIN PO Take 5,000 mcg by mouth dailyHistorical Med      psyllium (METAMUCIL SMOOTH TEXTURE) 58.6 % powder Take 2 teaspoon with 8 to 10 oz water., Disp-425 g, R-2Normal      dicyclomine (BENTYL) 10 MG capsule Take 1 capsule by mouth 3 times daily (before meals), Disp-90 capsule, R-3Normal      vitamin D (CHOLECALCIFEROL) 1000 units TABS tablet Take 2,000 Units by mouth dailyHistorical Med      hydrOXYzine (ATARAX) 25 MG tablet Take 25 mg by mouth      cetirizine (ZYRTEC) 10 MG tablet Take 10 mg by mouth daily.      simvastatin (ZOCOR) 10 MG tablet Take 10 mg by mouth nightly.               ALLERGIES     Bactrim [sulfamethoxazole-trimethoprim], Ciprofloxacin, Lipitor, Metronidazole, Penicillins, and Atorvastatin calcium    FAMILY HISTORY       Family History   Problem Relation Age of Onset    Stroke Mother     Heart Disease Mother     Heart Disease Father     Colon Cancer Father           SOCIAL HISTORY       Social History     Socioeconomic History    Marital status: Single   Tobacco Use    Smoking status: Every Day     Current packs/day: 0.50     Types: Cigarettes    Smokeless tobacco: Never   Vaping Use    Vaping Use: Never used   Substance and Sexual Activity    Alcohol use: No    Drug use: No    Sexual activity: Not Currently       SCREENINGS                        PHYSICAL EXAM    (up to 7 for level 4, 8 or more for level 5)     ED Triage Vitals

## 2024-01-17 ENCOUNTER — CLINICAL SUPPORT (OUTPATIENT)
Dept: PRIMARY CARE | Facility: CLINIC | Age: 64
End: 2024-01-17
Payer: COMMERCIAL

## 2024-01-17 ENCOUNTER — ANCILLARY PROCEDURE (OUTPATIENT)
Dept: RADIOLOGY | Facility: CLINIC | Age: 64
End: 2024-01-17
Payer: COMMERCIAL

## 2024-01-17 DIAGNOSIS — L29.8 CHRONIC PRURITIC RASH IN ADULT: Primary | ICD-10-CM

## 2024-01-17 DIAGNOSIS — E78.5 DYSLIPIDEMIA: ICD-10-CM

## 2024-01-17 PROCEDURE — 75571 CT HRT W/O DYE W/CA TEST: CPT

## 2024-01-17 RX ORDER — TRIAMCINOLONE ACETONIDE 40 MG/ML
40 INJECTION, SUSPENSION INTRA-ARTICULAR; INTRAMUSCULAR ONCE
Status: SHIPPED | OUTPATIENT
Start: 2024-01-17

## 2024-01-25 ENCOUNTER — OFFICE VISIT (OUTPATIENT)
Dept: PRIMARY CARE | Facility: CLINIC | Age: 64
End: 2024-01-25
Payer: COMMERCIAL

## 2024-01-25 ENCOUNTER — LAB (OUTPATIENT)
Dept: LAB | Facility: LAB | Age: 64
End: 2024-01-25
Payer: COMMERCIAL

## 2024-01-25 VITALS
WEIGHT: 153 LBS | SYSTOLIC BLOOD PRESSURE: 128 MMHG | BODY MASS INDEX: 28.44 KG/M2 | RESPIRATION RATE: 16 BRPM | HEART RATE: 69 BPM | TEMPERATURE: 97.6 F | OXYGEN SATURATION: 96 % | DIASTOLIC BLOOD PRESSURE: 70 MMHG

## 2024-01-25 DIAGNOSIS — L30.9 DERMATITIS, UNSPECIFIED: Primary | ICD-10-CM

## 2024-01-25 DIAGNOSIS — L30.9 DERMATITIS, UNSPECIFIED: ICD-10-CM

## 2024-01-25 DIAGNOSIS — R93.1 ELEVATED CORONARY ARTERY CALCIUM SCORE: Primary | ICD-10-CM

## 2024-01-25 LAB
ALT SERPL W P-5'-P-CCNC: 19 U/L (ref 7–45)
AST SERPL W P-5'-P-CCNC: 16 U/L (ref 9–39)
BASOPHILS # BLD AUTO: 0.03 X10*3/UL (ref 0–0.1)
BASOPHILS NFR BLD AUTO: 0.4 %
EOSINOPHIL # BLD AUTO: 0.12 X10*3/UL (ref 0–0.7)
EOSINOPHIL NFR BLD AUTO: 1.6 %
ERYTHROCYTE [DISTWIDTH] IN BLOOD BY AUTOMATED COUNT: 13 % (ref 11.5–14.5)
ERYTHROCYTE [SEDIMENTATION RATE] IN BLOOD BY WESTERGREN METHOD: 15 MM/H (ref 0–30)
HCT VFR BLD AUTO: 40.5 % (ref 36–46)
HGB BLD-MCNC: 13.7 G/DL (ref 12–16)
IGE SERPL-ACNC: 932 IU/ML (ref 0–214)
IMM GRANULOCYTES # BLD AUTO: 0.02 X10*3/UL (ref 0–0.7)
IMM GRANULOCYTES NFR BLD AUTO: 0.3 % (ref 0–0.9)
LYMPHOCYTES # BLD AUTO: 1.72 X10*3/UL (ref 1.2–4.8)
LYMPHOCYTES NFR BLD AUTO: 23.3 %
MCH RBC QN AUTO: 30.7 PG (ref 26–34)
MCHC RBC AUTO-ENTMCNC: 33.8 G/DL (ref 32–36)
MCV RBC AUTO: 91 FL (ref 80–100)
MONOCYTES # BLD AUTO: 0.49 X10*3/UL (ref 0.1–1)
MONOCYTES NFR BLD AUTO: 6.6 %
NEUTROPHILS # BLD AUTO: 5 X10*3/UL (ref 1.2–7.7)
NEUTROPHILS NFR BLD AUTO: 67.8 %
NRBC BLD-RTO: 0 /100 WBCS (ref 0–0)
PLATELET # BLD AUTO: 330 X10*3/UL (ref 150–450)
RBC # BLD AUTO: 4.46 X10*6/UL (ref 4–5.2)
WBC # BLD AUTO: 7.4 X10*3/UL (ref 4.4–11.3)

## 2024-01-25 PROCEDURE — 1036F TOBACCO NON-USER: CPT | Performed by: INTERNAL MEDICINE

## 2024-01-25 PROCEDURE — 86038 ANTINUCLEAR ANTIBODIES: CPT

## 2024-01-25 PROCEDURE — 99214 OFFICE O/P EST MOD 30 MIN: CPT | Performed by: INTERNAL MEDICINE

## 2024-01-25 PROCEDURE — 82785 ASSAY OF IGE: CPT

## 2024-01-25 PROCEDURE — 85025 COMPLETE CBC W/AUTO DIFF WBC: CPT

## 2024-01-25 PROCEDURE — 36415 COLL VENOUS BLD VENIPUNCTURE: CPT

## 2024-01-25 PROCEDURE — 84450 TRANSFERASE (AST) (SGOT): CPT

## 2024-01-25 PROCEDURE — 84460 ALANINE AMINO (ALT) (SGPT): CPT

## 2024-01-25 PROCEDURE — 85652 RBC SED RATE AUTOMATED: CPT

## 2024-01-25 NOTE — PROGRESS NOTES
Patient here for a follow up on hives     Subjective   Patient ID: Sarah Walker is a 63 y.o. female who presents for Follow-up.    The patient met with Dermatology at 's office yesterday, and underwent a skin biopsy that was closed with a stitch that needs to be removed.  She notes some bleeding at the site, and is awaiting the results to be discussed in a follow-up next week.  The patient continues to experience unexplained recurrent urticaria, and she suspects she may have an autoimmune condition that is precipitated by stress.  She notes some relief today following a recent corticosteroid injection, and is taking Zyrtec, Claritin, and hydroxyzine. The patient has not been taking doxepin prescribed during a recent ED visit as she is concerned that she is on a number of medications already.  She is scheduled for an appointment with Allergy/Immunology in 6/2024, and is trying to set up a sooner appointment.      The patient has been taking her simvastatin 40mg once daily consistently.  She completed a CT Cardiac Score on 1/17/2024 which resulted in a value of 253.4. She has not been seen by Cardiology in the past, but has recently quit smoking.  The patient states that she is allergic to ASA as it results in angioedema.         Review of Systems   Skin:  Positive for rash.     Objective   Physical Exam  Constitutional:       Appearance: Normal appearance.   Neck:      Vascular: No carotid bruit.   Cardiovascular:      Rate and Rhythm: Normal rate and regular rhythm.      Heart sounds: Normal heart sounds.   Pulmonary:      Effort: Pulmonary effort is normal.      Breath sounds: Normal breath sounds.   Abdominal:      General: Bowel sounds are normal.      Palpations: Abdomen is soft.      Tenderness: There is no abdominal tenderness.   Skin:     General: Skin is warm and dry.   Neurological:      General: No focal deficit present.      Mental Status: She is alert and oriented to person, place, and time.  Mental status is at baseline.   Psychiatric:         Mood and Affect: Mood normal.         Behavior: Behavior normal.         Assessment/Plan       IMPRESSIONS/PLAN:     Recurrent Urticaria  - Seen by Dermatology 1/24/2024, and awaiting biopsy results.  Cleaned and dressed wound with latex-free bandage in the office today.  Has been a chronic issue but recently worsening.  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   No known triggers per patient. Prednisone, cetrizine, and hydroxyzine have not been helping. Advised the patient to switch to loratadine BID as suggested by ED on 1/8/2024.  Also suggested she start Doxepin at bedtime as prescribed by ER.  Advised patient to call the clinic in 1 week with update, and to schedule a nurse's visit for Kenalog injection as this is transiently helpful.  Instructed patient to go to the ED with recurrence of severe exacerbation or any lip swelling, and she verbalized agreement.  Patient has scheduled appointment with  from Dermatology on 1/22/2024.  Previously ordered new referral to Allergy/Immunology, as previous did not accept her insurance and patient will set this up today.  Explained to patient from primary care standpoint unfortunately not much more we can offer as she has tried a multitude of medications (and this has been on / off since 2015).  Will need to see specialists at this point- strongly encouraged her to keep upcoming appointments.  Follow-up in two weeks for close monitoring.      HLD   - Stable per 9/2023.  Continue with simvastatin 40mg QD.  ASCVD 5.7% 12/2023.  CT Cardiac Score 253.4 1/2024.  Ordered referral to  from Cardiology.  The patient states that she is allergic to ASA.    /70 in the office today.     Anxiety   - Stable.  Continue with hydroxyzine 25mg QD, this also help with chronic hives.      Anaphylaxis  - Refilled Epinephrine 0.3mg/0.3ml as 1 syringe as directed on packaging.  Advised to proceed to ER if she feels she needs to take this (or immediately after is she does need it).  Reviewed indications and criteria for usage with her.     Left low back pain   - Now follows with Dr. Fuentes in Orthopedics. Xray of the hip performed on 06/2022 revealed Mild osteoarthritis bilateral hips and sacroiliac joints. She has started physical therapy. MRI of L-spine performed 05/2022 revealed Multilevel lumbar spondylopathy, worst at L3-4 and L4-5, with mild interval progression compared to prior examination dated 12/29/2017.     Asthma   - Uses ProAir  mcg/act 1-2 puffs every 4-6 hours as needed.     History of Tobacco Use/Lung Nodules   - Chest CT 8/2021 showed stable 3mm solid lung nodule in right lower lobe and mild emphysema, to repeat annually. Stable per repeat 8/2022.  Stable per 9/2023 repeat.     Lichen Planus  - Previously ordered referral for Dermatology.  Followed with [facility] in the past.     Left Shoulder Pain   - Healing well after a fall, continue with icing for now.  Advised patient to continue with methocarbamol as prescribed which she has at home.  Xray 12/2022 unremarkable.  Following with  in [facility].       Vitamin D Deficiency   - Takes Vitamin D 50mcg QD.      Insomnia   - Previously tried melatonin, trazodone, and mirtazapine 15mg at night prn.     Health Maintenance   - Routine labs 9/2023. Last Mammogram 9/2023. Last colonoscopy 10/2020, due for repeat 10/2025.     Follow-up according to routine health maintenance, call sooner if needed.        Scribe Attestation  By signing my name below, I, Sindi Kemp, Suzi   attest that this documentation has been prepared under the direction and in the presence of Panfilo Christina DO.   Sindi Kemp 01/25/24 1:09 PM

## 2024-01-26 LAB — ANA SER QL HEP2 SUBST: NEGATIVE

## 2024-02-02 ENCOUNTER — TELEPHONE (OUTPATIENT)
Dept: PRIMARY CARE | Facility: CLINIC | Age: 64
End: 2024-02-02
Payer: COMMERCIAL

## 2024-02-02 DIAGNOSIS — L50.9 HIVES: Primary | ICD-10-CM

## 2024-02-02 DIAGNOSIS — L50.9 URTICARIA: ICD-10-CM

## 2024-02-02 NOTE — TELEPHONE ENCOUNTER
Patient needs a referral to an allergist by the name of Nahum Rogers located in [place].   Patient also is requesting a recommendation for her hives, because the shot really isn't helping. Patient is wondering if there is a higher milligram for Kenalog that she can take.     Please advise at home number.

## 2024-02-02 NOTE — TELEPHONE ENCOUNTER
I entered a referral for her.  Will print to fax over.  Did she discuss with dermatology?  Did they have any recommendations?

## 2024-02-07 ENCOUNTER — LAB (OUTPATIENT)
Dept: LAB | Facility: LAB | Age: 64
End: 2024-02-07
Payer: COMMERCIAL

## 2024-02-07 ENCOUNTER — OFFICE VISIT (OUTPATIENT)
Dept: PRIMARY CARE | Facility: CLINIC | Age: 64
End: 2024-02-07
Payer: COMMERCIAL

## 2024-02-07 VITALS
BODY MASS INDEX: 27.7 KG/M2 | DIASTOLIC BLOOD PRESSURE: 70 MMHG | WEIGHT: 149 LBS | OXYGEN SATURATION: 99 % | RESPIRATION RATE: 16 BRPM | SYSTOLIC BLOOD PRESSURE: 148 MMHG | HEART RATE: 96 BPM | TEMPERATURE: 96.6 F

## 2024-02-07 DIAGNOSIS — Z00.00 HEALTHCARE MAINTENANCE: ICD-10-CM

## 2024-02-07 DIAGNOSIS — J45.909 UNCOMPLICATED ASTHMA, UNSPECIFIED ASTHMA SEVERITY, UNSPECIFIED WHETHER PERSISTENT (HHS-HCC): ICD-10-CM

## 2024-02-07 DIAGNOSIS — L50.1 CHRONIC IDIOPATHIC URTICARIA: Primary | ICD-10-CM

## 2024-02-07 LAB
APPEARANCE UR: CLEAR
BILIRUB UR STRIP.AUTO-MCNC: NEGATIVE MG/DL
COLOR UR: ABNORMAL
GLUCOSE UR STRIP.AUTO-MCNC: NEGATIVE MG/DL
KETONES UR STRIP.AUTO-MCNC: NEGATIVE MG/DL
LEUKOCYTE ESTERASE UR QL STRIP.AUTO: NEGATIVE
NITRITE UR QL STRIP.AUTO: NEGATIVE
PH UR STRIP.AUTO: 7 [PH]
PROT UR STRIP.AUTO-MCNC: NEGATIVE MG/DL
RBC # UR STRIP.AUTO: NEGATIVE /UL
SP GR UR STRIP.AUTO: 1
UROBILINOGEN UR STRIP.AUTO-MCNC: <2 MG/DL

## 2024-02-07 PROCEDURE — 99214 OFFICE O/P EST MOD 30 MIN: CPT | Performed by: INTERNAL MEDICINE

## 2024-02-07 PROCEDURE — 1159F MED LIST DOCD IN RCRD: CPT | Performed by: INTERNAL MEDICINE

## 2024-02-07 PROCEDURE — 1160F RVW MEDS BY RX/DR IN RCRD: CPT | Performed by: INTERNAL MEDICINE

## 2024-02-07 PROCEDURE — 1036F TOBACCO NON-USER: CPT | Performed by: INTERNAL MEDICINE

## 2024-02-07 PROCEDURE — 81003 URINALYSIS AUTO W/O SCOPE: CPT

## 2024-02-07 RX ORDER — MINERAL OIL
ENEMA (ML) RECTAL
Qty: 30 TABLET | Refills: 3 | Status: SHIPPED | OUTPATIENT
Start: 2024-02-07

## 2024-02-07 RX ORDER — LEVOCETIRIZINE DIHYDROCHLORIDE 5 MG/1
TABLET, FILM COATED ORAL
Qty: 30 TABLET | Refills: 3 | Status: SHIPPED | OUTPATIENT
Start: 2024-02-07

## 2024-02-07 ASSESSMENT — ENCOUNTER SYMPTOMS: URTICARIA: 1

## 2024-02-07 NOTE — PROGRESS NOTES
Patient here for a 2 week follow up on hives    Subjective   Patient ID: Sarah Walker is a 64 y.o. female who presents for Hives.    The patient notes no difference in diffuse urticaria since her last clinic visit two weeks prior that has been ongoing since 11/2023.  She met with Dermatology recently, and notes that the results of the biopsy showed urticaria.  A recent blood workup showed elevated IgE of 932 IU/mL on 1/25/2024.  The patient is currently taking Claritin and Zyrtec. She is currently awaiting an appointment with  from Allergy/Immunology on 3/8/2024.  The patient prefers not to take oral prednisone due to poor tolerance.        The patient has a previous history of lichen planus that was treated with methotrexate and subsequently with hydroxychloroquine.      The patient reports left-sided shoulder and chest wall pain with movement.  Worse with moving her shoulder around.    The patient reports hemorrhoids, and inquires about management options.   She has been having some indigestion as well.      Hives      Review of Systems   Gastrointestinal:         Positive for indigestion, and hemorrhoids.   Skin:         Positive for urticaria.       Objective   Physical Exam  Constitutional:       Appearance: Normal appearance.   Neck:      Vascular: No carotid bruit.   Cardiovascular:      Rate and Rhythm: Normal rate and regular rhythm.      Heart sounds: Normal heart sounds.   Pulmonary:      Effort: Pulmonary effort is normal.      Breath sounds: Normal breath sounds.   Abdominal:      General: Bowel sounds are normal.      Palpations: Abdomen is soft.      Tenderness: There is no abdominal tenderness.   Skin:     General: Skin is warm and dry.   Neurological:      General: No focal deficit present.      Mental Status: She is alert and oriented to person, place, and time. Mental status is at baseline.   Psychiatric:         Mood and Affect: Mood normal.         Behavior: Behavior normal.          Assessment/Plan   Problem List Items Addressed This Visit             ICD-10-CM    Asthma J45.909     Other Visit Diagnoses         Codes    Chronic idiopathic urticaria    -  Primary L50.1    Relevant Medications    fexofenadine (Allegra) 180 mg tablet    levocetirizine (Xyzal) 5 mg tablet    Healthcare maintenance     Z00.00    Relevant Orders    Urinalysis with Reflex Microscopic            IMPRESSIONS/PLAN :      Recurrent Urticaria  - Has been a chronic issue but recently worsening.  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  No known triggers per patient. Advised patient to STOP Claritin and Zyrtec.  Prescribed fexofenadine 180mg QAM and Xyzal 5mg once nightly.  Can also increase Vitamin D from 1000 to 4000 units once daily.  Continue hydroxyzine 25 mg up to TID prn.  Previously on Prednisone and cetrizine have not been helping.  Instructed patient to go to the ED with recurrence of severe exacerbation or any lip swelling, and she verbalized agreement.  Now following with  from Dermatology. Awaiting appointment with  from Allergy/Immunology.  Explained to patient from primary care standpoint unfortunately not much more we can offer as she has tried a multitude of medications (and this has been on / off since 2015).  Will need to see specialists at this point- patient agreed to keep upcoming appointments.      Hemorrhoids  - Recommended Tucks medicated pads OTC.  Colonoscopy UTD 10/2020- due 2025.    /70 in the office today.     HLD   - Stable per 9/2023.  Continue with simvastatin 40mg QD.  ASCVD 7.2% 2/2024.  CT Cardiac Score 253.4 per 1/2024.   Previously referred to cardiology.  Encouraged her to set up appointment.      Anxiety   - Stable.  Continue with hydroxyzine 25mg QD, this also help with chronic hives.     Anaphylaxis  - Refilled  Epinephrine 0.3mg/0.3ml as 1 syringe as directed on packaging.  Advised to proceed to ER if she feels she needs to take this (or immediately after is she does need it).  Reviewed indications and criteria for usage with her.     Left low back pain   - Now follows with Dr. Fuentes in Orthopedics. Xray of the hip performed on 06/2022 revealed Mild osteoarthritis bilateral hips and sacroiliac joints. She has started physical therapy. MRI of L-spine performed 05/2022 revealed Multilevel lumbar spondylopathy, worst at L3-4 and L4-5, with mild interval progression compared to prior examination dated 12/29/2017.     Asthma   - Uses ProAir  mcg/act 1-2 puffs every 4-6 hours as needed.     History of Tobacco Use/Lung Nodules   -She quit smoking 2 years ago.   Stable per 9/2023 repeat.     Lichen Planus  - She will discuss when she follows up with derm.     Left Shoulder Pain   - Recurrence.  Noted above.  Likely some arthritis / rotator cuff tendonitis.  Healing well after a fall, continue with icing for now.  Xray 12/2022 unremarkable.      Vitamin D Deficiency   - Takes Vitamin D 50mcg to be increased to 4000 units once daily.      Insomnia   - Previously tried melatonin, trazodone, and mirtazapine 15mg at night prn.     Health Maintenance   - Routine labs 9/2023. Last Mammogram 9/2023. Last colonoscopy 10/2020, due for repeat 10/2025.  Lung screen CT UTD due 9/24     Follow-up according to routine health maintenance, call sooner if needed.        Scribe Attestation  By signing my name below, I, Suzi Garcia   attest that this documentation has been prepared under the direction and in the presence of Panfilo Christina DO.   Sindi Kemp 02/07/24 2:02 PM

## 2024-03-15 ENCOUNTER — APPOINTMENT (OUTPATIENT)
Dept: CARDIOLOGY | Facility: CLINIC | Age: 64
End: 2024-03-15
Payer: COMMERCIAL

## 2024-03-19 ENCOUNTER — TELEPHONE (OUTPATIENT)
Dept: PRIMARY CARE | Facility: CLINIC | Age: 64
End: 2024-03-19
Payer: COMMERCIAL

## 2024-03-19 NOTE — TELEPHONE ENCOUNTER
Pt of Dr Christina's. Called in to make us aware she is stopping her allergy meds since they were costly. She received papers to assist her in signing up for Medicare Part D coverage for her prescription plan. She is asking for some help in clarifying Medicare she has quite a bit of questions as she is confused about it overall. Just an fyi going to send this to Pauline as well. Please advise. Thank you!

## 2024-03-20 ENCOUNTER — APPOINTMENT (OUTPATIENT)
Dept: PRIMARY CARE | Facility: CLINIC | Age: 64
End: 2024-03-20
Payer: COMMERCIAL

## 2024-03-27 ENCOUNTER — OFFICE VISIT (OUTPATIENT)
Dept: CARDIOLOGY | Facility: CLINIC | Age: 64
End: 2024-03-27
Payer: COMMERCIAL

## 2024-03-27 VITALS
OXYGEN SATURATION: 96 % | SYSTOLIC BLOOD PRESSURE: 134 MMHG | WEIGHT: 134.5 LBS | HEIGHT: 61 IN | BODY MASS INDEX: 25.39 KG/M2 | DIASTOLIC BLOOD PRESSURE: 97 MMHG | HEART RATE: 69 BPM

## 2024-03-27 DIAGNOSIS — R93.1 ELEVATED CORONARY ARTERY CALCIUM SCORE: ICD-10-CM

## 2024-03-27 PROBLEM — R11.0 NAUSEA: Status: ACTIVE | Noted: 2024-03-27

## 2024-03-27 PROBLEM — I25.10 CALCIFICATION OF CORONARY ARTERY: Status: ACTIVE | Noted: 2023-09-11

## 2024-03-27 PROBLEM — M75.100 ROTATOR CUFF SYNDROME: Status: ACTIVE | Noted: 2024-03-27

## 2024-03-27 PROBLEM — B02.9 SHINGLES: Status: ACTIVE | Noted: 2024-03-27

## 2024-03-27 PROBLEM — R15.9 URINARY AND FECAL INCONTINENCE: Status: ACTIVE | Noted: 2024-03-27

## 2024-03-27 PROBLEM — J01.90 ACUTE SINUSITIS: Status: ACTIVE | Noted: 2024-03-27

## 2024-03-27 PROBLEM — M54.2 NECK PAIN: Status: ACTIVE | Noted: 2024-03-27

## 2024-03-27 PROBLEM — R10.9 ABDOMINAL PAIN: Status: ACTIVE | Noted: 2024-03-27

## 2024-03-27 PROBLEM — R09.89 PULMONARY CONGESTION: Status: ACTIVE | Noted: 2024-03-27

## 2024-03-27 PROBLEM — R73.03 PREDIABETES: Status: ACTIVE | Noted: 2024-03-27

## 2024-03-27 PROBLEM — Z86.39 HISTORY OF HYPOTHYROIDISM: Status: ACTIVE | Noted: 2024-03-27

## 2024-03-27 PROBLEM — L65.9 LOSS OF HAIR: Status: ACTIVE | Noted: 2024-03-27

## 2024-03-27 PROBLEM — K59.09 CHRONIC CONSTIPATION: Status: ACTIVE | Noted: 2024-03-27

## 2024-03-27 PROBLEM — R32 URINARY AND FECAL INCONTINENCE: Status: ACTIVE | Noted: 2024-03-27

## 2024-03-27 PROBLEM — S50.369S: Status: ACTIVE | Noted: 2018-05-14

## 2024-03-27 PROBLEM — L50.1 IDIOPATHIC URTICARIA: Status: ACTIVE | Noted: 2018-05-14

## 2024-03-27 PROBLEM — F17.200 SMOKER: Status: ACTIVE | Noted: 2024-03-27

## 2024-03-27 PROBLEM — I25.84 CALCIFICATION OF CORONARY ARTERY: Status: ACTIVE | Noted: 2023-09-11

## 2024-03-27 PROBLEM — R30.0 DYSURIA: Status: ACTIVE | Noted: 2024-03-27

## 2024-03-27 PROBLEM — Z86.39 HISTORY OF ELEVATED LIPIDS: Status: ACTIVE | Noted: 2024-03-27

## 2024-03-27 PROBLEM — R74.8 HIGH ALKALINE PHOSPHATASE: Status: ACTIVE | Noted: 2024-03-27

## 2024-03-27 PROBLEM — E78.00 HIGH CHOLESTEROL: Status: ACTIVE | Noted: 2024-03-27

## 2024-03-27 PROCEDURE — 1159F MED LIST DOCD IN RCRD: CPT | Performed by: INTERNAL MEDICINE

## 2024-03-27 PROCEDURE — 99214 OFFICE O/P EST MOD 30 MIN: CPT | Performed by: INTERNAL MEDICINE

## 2024-03-27 PROCEDURE — 93010 ELECTROCARDIOGRAM REPORT: CPT | Performed by: INTERNAL MEDICINE

## 2024-03-27 PROCEDURE — 1036F TOBACCO NON-USER: CPT | Performed by: INTERNAL MEDICINE

## 2024-03-27 PROCEDURE — 1125F AMNT PAIN NOTED PAIN PRSNT: CPT | Performed by: INTERNAL MEDICINE

## 2024-03-27 PROCEDURE — 1160F RVW MEDS BY RX/DR IN RCRD: CPT | Performed by: INTERNAL MEDICINE

## 2024-03-27 PROCEDURE — 93005 ELECTROCARDIOGRAM TRACING: CPT | Performed by: INTERNAL MEDICINE

## 2024-03-27 RX ORDER — MONTELUKAST SODIUM 10 MG/1
10 TABLET ORAL NIGHTLY
COMMUNITY
Start: 2024-03-08 | End: 2025-03-08

## 2024-03-27 RX ORDER — FAMOTIDINE 20 MG/1
20 TABLET, FILM COATED ORAL 2 TIMES DAILY
COMMUNITY
Start: 2024-03-08 | End: 2025-03-08

## 2024-03-27 RX ORDER — TIMOLOL MALEATE 5 MG/ML
1 SOLUTION/ DROPS OPHTHALMIC 2 TIMES DAILY
COMMUNITY
Start: 2024-03-25

## 2024-03-27 RX ORDER — HYDROXYZINE PAMOATE 25 MG/1
25 CAPSULE ORAL DAILY
COMMUNITY
Start: 2024-03-09

## 2024-03-27 RX ORDER — LORATADINE 10 MG/1
10 TABLET ORAL 2 TIMES DAILY
COMMUNITY
Start: 2024-02-08

## 2024-03-27 ASSESSMENT — PAIN SCALES - GENERAL: PAINLEVEL: 10-WORST PAIN EVER

## 2024-03-27 NOTE — PROGRESS NOTES
Name : Sarah Walker    : 1960   MRN : [id number]   ENC Date : 24     Reason for visit: Abnormal coronary calcium score    Assessment and Plan:  Elevated coronary calcium: Patient does not have any cardiac symptoms.  Her chest pain is clearly musculoskeletal in nature.  Her exam is normal.  Her EKG is normal.  I do not see need for stress testing.  She is already been on a statin at a good dose.  I would continue the simvastatin which she seems to be tolerating quite nicely despite her other medication intolerances.  No further testing is needed.  Currently there are no guidelines to recommend repeating coronary calcium scores.  I would not recommend aspirin.  She states that she may be allergic to aspirin.  Regardless aspirin is not indicated for primary prevention for women and her calcium score is so terribly elevated that I would recommend aspirin for primary prevention for this patient.  Hives/stress: Patient asked if these could be related to her heart.  I told her certainly stress increases the risk for myocardial infarction but there are no specific therapies to prevent this other than treating the underlying cause of her stress.  Some of it is metabolic with the hives she has been experiencing.  Some of it is psychosocial with her daughter.  Regardless of the increased risk there is nothing specifically else to do from a cardiac perspective.  Elevated blood pressure without diagnosis of hypertension: Last 2 blood pressure readings are high normal at best.  Typically her blood pressure has been much lower in the past.  I suspect the elevated blood pressures are related to the stress of the hives and her issues with her daughter.  If however it continues to be elevated it would warrant treatment.  She states that she had angioedema in the past but is unclear if it is related to any specific medications.  I do not think I would avoid anything except ACE inhibitors.  I would probably choose  amlodipine as first-line therapy but certainly could go with an angiotensin receptor blocker as well.  She will follow-up with her primary care physician regarding blood pressure.  Disp: RTO as needed      HPI:  Patient is seen today for evaluation of coronary calcium score.  Her coronary calcium score was elevated at 253.  Depending on the scoring system this would place her at the mild to at worst moderate risk for cardiovascular adverse outcome over the next decade.  She is already on a statin at a good dose and is tolerating it well.  Her chest discomfort is clearly musculoskeletal in nature.  She has had discomfort in her left upper chest that is reproducible with extension and elevation of her arm.  She has no substernal chest pressure tightness or squeezing.  When she exercises and does activity she feels no symptoms in her chest.      Problem List:   Patient Active Problem List   Diagnosis    Allergic rhinitis    Annular tear of lumbar disc    Anxiety    Arthritis    Hand arthritis    Hip osteoarthritis    Osteoarthritis    Plantar fasciitis    Asthma    Chronic pruritic rash in adult    Depression    Diverticulitis of colon    Diverticulosis    Dyslipidemia    Failed back syndrome, lumbar    Insomnia    Lichen planus    Low back pain    Lumbar radiculitis    Lumbar spondylosis    Preglaucoma, bilateral    S/P lumbar discectomy    Vertigo    Vitamin D deficiency    Abdominal pain    Acute sinusitis    Calcification of coronary artery    Chronic constipation    Chronic obstructive pulmonary disease (CMS/HCC)    Dysuria    High alkaline phosphatase    Displacement of cervical intervertebral disc without myelopathy    High cholesterol    History of elevated lipids    History of hypothyroidism    Insect bite (nonvenomous) of unspecified elbow, sequela (CODE)    Loss of hair    Lumbar radicular pain    Nausea    Neck pain    Muscle cramps    Idiopathic urticaria    Prediabetes    Pulmonary congestion    Rotator  cuff syndrome    Spinal stenosis of lumbar region with radiculopathy    Shingles    Smoker    Urinary and fecal incontinence        Meds:   Current Outpatient Medications on File Prior to Visit   Medication Sig Dispense Refill    albuterol 90 mcg/actuation inhaler Inhale 1 puff 3 times a day.      chlorhexidine (Peridex) 0.12 % solution Use 15 mL in the mouth or throat if needed.      cholecalciferol (Vitamin D-3) 50 MCG (2000 UT) tablet Take 1 tablet (2,000 Units) by mouth once daily.      clobetasol (Temovate) 0.05 % ointment apply to affected area twice a day if needed 80 g 0    cyclobenzaprine (Flexeril) 10 mg tablet Take 1 tablet (10 mg) by mouth as needed at bedtime.      doxepin (SINEquan) 25 mg capsule Take 1 capsule (25 mg) by mouth once daily at bedtime.      EPINEPHrine 0.3 mg/0.3 mL injection syringe Inject 0.3 mL (0.3 mg) into the muscle 1 time if needed for anaphylaxis for up to 1 dose. 2 each 2    famotidine (Pepcid) 20 mg tablet Take 1 tablet (20 mg) by mouth twice a day.      fexofenadine (Allegra) 180 mg tablet Take one tablet in the AM 30 tablet 3    fluticasone (Flonase) 50 mcg/actuation nasal spray Administer 2 sprays into each nostril once daily. 16 g 1    hydrocortisone 2.5 % cream apply to affected area twice a day if needed for 2 weeks ONE WEEK OFF      hydrOXYzine HCL (Atarax) 25 mg tablet Take 1 tablet (25 mg) by mouth every 8 hours if needed for itching. 30 tablet 1    hydrOXYzine pamoate (Vistaril) 25 mg capsule Take 1 capsule (25 mg) by mouth once daily.      latanoprost (Xalatan) 0.005 % ophthalmic solution Administer 1 drop into affected eye(s) once daily.      levocetirizine (Xyzal) 5 mg tablet Take 1 tablet at bedtime 30 tablet 3    loratadine (Claritin) 10 mg tablet Take 1 tablet (10 mg) by mouth twice a day.      montelukast (Singulair) 10 mg tablet Take 1 tablet (10 mg) by mouth once daily at bedtime.      Mucinex 600 mg 12 hr tablet Take 1 tablet (600 mg) by mouth 2 times a day.       simvastatin (Zocor) 40 mg tablet Take 1 tablet (40 mg) by mouth once daily at bedtime. 90 tablet 3    timolol (Timoptic) 0.5 % ophthalmic solution Administer 1 drop into both eyes 2 times a day.      [DISCONTINUED] clindamycin (Cleocin) 150 mg capsule Take 1 capsule (150 mg) by mouth 3 times a day.       Current Facility-Administered Medications on File Prior to Visit   Medication Dose Route Frequency Provider Last Rate Last Admin    triamcinolone acetonide (Kenalog-40) injection 40 mg  40 mg intramuscular Once Panfilo Christina, DO           All:   Allergies   Allergen Reactions    Ciprofloxacin Hives and Other    Metronidazole Hives and Unknown    Penicillins Hives and Other    Sulfamethoxazole-Trimethoprim Hives and Unknown    Atorvastatin Rash and Other       Fam Hx:   Family History   Problem Relation Name Age of Onset    Liver cancer Father      Depression Sister      Arthritis Sister      Asthma Brother      Depression Daughter         Soc Hx:   Social History     Socioeconomic History    Marital status: Single     Spouse name: Not on file    Number of children: Not on file    Years of education: Not on file    Highest education level: Not on file   Occupational History     Employer: NONE   Tobacco Use    Smoking status: Former     Types: Cigarettes    Smokeless tobacco: Never   Vaping Use    Vaping Use: Never used   Substance and Sexual Activity    Alcohol use: Yes    Drug use: Never    Sexual activity: Not on file   Other Topics Concern    Not on file   Social History Narrative    Not on file     Social Determinants of Health     Financial Resource Strain: Not on file   Food Insecurity: Not on file   Transportation Needs: Not on file   Physical Activity: Not on file   Stress: Not on file   Social Connections: Not on file   Intimate Partner Violence: Not on file   Housing Stability: Not on file       ROS    VS: BP (!) 134/97 (BP Location: Left arm, Patient Position: Sitting, BP Cuff Size: Adult)   Pulse  "69   Ht 1.549 m (5' 1\")   Wt 61 kg (134 lb 8 oz)   SpO2 96%   BMI 25.41 kg/m²      Physical Exam  Vitals reviewed.   Constitutional:       Appearance: Normal appearance.   Eyes:      Pupils: Pupils are equal, round, and reactive to light.   Neck:      Vascular: No JVD.   Cardiovascular:      Rate and Rhythm: Normal rate and regular rhythm.      Pulses: Normal pulses.      Heart sounds: No murmur heard.     No gallop.   Pulmonary:      Effort: No respiratory distress.      Breath sounds: No wheezing or rales.   Abdominal:      General: Abdomen is flat. There is no distension.      Palpations: Abdomen is soft.   Musculoskeletal:         General: No swelling.      Right lower leg: No edema.      Left lower leg: No edema.   Neurological:      General: No focal deficit present.      Mental Status: She is alert.   Psychiatric:         Mood and Affect: Mood normal.          ECG: Normal sinus rhythm.  Borderline sinus arrhythmia.  Otherwise unremarkable ECG      Perry Leo MD   "

## 2024-03-29 LAB
ATRIAL RATE: 64 BPM
P AXIS: 49 DEGREES
P OFFSET: 206 MS
P ONSET: 164 MS
PR INTERVAL: 132 MS
Q ONSET: 230 MS
QRS COUNT: 10 BEATS
QRS DURATION: 64 MS
QT INTERVAL: 392 MS
QTC CALCULATION(BAZETT): 404 MS
QTC FREDERICIA: 400 MS
R AXIS: 25 DEGREES
T AXIS: 44 DEGREES
T OFFSET: 426 MS
VENTRICULAR RATE: 64 BPM

## 2024-04-10 ENCOUNTER — APPOINTMENT (OUTPATIENT)
Dept: PRIMARY CARE | Facility: CLINIC | Age: 64
End: 2024-04-10
Payer: COMMERCIAL

## 2024-05-03 ENCOUNTER — APPOINTMENT (OUTPATIENT)
Dept: RADIOLOGY | Facility: HOSPITAL | Age: 64
End: 2024-05-03
Payer: COMMERCIAL

## 2024-05-03 ENCOUNTER — HOSPITAL ENCOUNTER (EMERGENCY)
Facility: HOSPITAL | Age: 64
Discharge: LEFT BEFORE DISCHARGE | End: 2024-05-03
Attending: INTERNAL MEDICINE
Payer: COMMERCIAL

## 2024-05-03 ENCOUNTER — HOSPITAL ENCOUNTER (INPATIENT)
Facility: HOSPITAL | Age: 64
LOS: 3 days | Discharge: HOME | End: 2024-05-07
Attending: INTERNAL MEDICINE | Admitting: SURGERY
Payer: COMMERCIAL

## 2024-05-03 VITALS
SYSTOLIC BLOOD PRESSURE: 139 MMHG | OXYGEN SATURATION: 98 % | BODY MASS INDEX: 26.58 KG/M2 | DIASTOLIC BLOOD PRESSURE: 83 MMHG | HEART RATE: 76 BPM | WEIGHT: 150 LBS | HEIGHT: 63 IN | RESPIRATION RATE: 18 BRPM | TEMPERATURE: 97.3 F

## 2024-05-03 DIAGNOSIS — K57.92 DIVERTICULITIS: ICD-10-CM

## 2024-05-03 DIAGNOSIS — K57.80 DIVERTICULITIS OF INTESTINE WITH ABSCESS WITHOUT BLEEDING, UNSPECIFIED PART OF INTESTINAL TRACT: Primary | ICD-10-CM

## 2024-05-03 LAB
ALBUMIN SERPL BCP-MCNC: 4.4 G/DL (ref 3.4–5)
ALP SERPL-CCNC: 93 U/L (ref 33–136)
ALT SERPL W P-5'-P-CCNC: 20 U/L (ref 7–45)
ANION GAP SERPL CALC-SCNC: 12 MMOL/L (ref 10–20)
APPEARANCE UR: CLEAR
AST SERPL W P-5'-P-CCNC: 16 U/L (ref 9–39)
BASOPHILS # BLD AUTO: 0.05 X10*3/UL (ref 0–0.1)
BASOPHILS NFR BLD AUTO: 0.4 %
BILIRUB SERPL-MCNC: 0.5 MG/DL (ref 0–1.2)
BILIRUB UR STRIP.AUTO-MCNC: NEGATIVE MG/DL
BUN SERPL-MCNC: 9 MG/DL (ref 6–23)
CALCIUM SERPL-MCNC: 9.5 MG/DL (ref 8.6–10.3)
CHLORIDE SERPL-SCNC: 105 MMOL/L (ref 98–107)
CO2 SERPL-SCNC: 25 MMOL/L (ref 21–32)
COLOR UR: ABNORMAL
CREAT SERPL-MCNC: 0.44 MG/DL (ref 0.5–1.05)
EGFRCR SERPLBLD CKD-EPI 2021: >90 ML/MIN/1.73M*2
EOSINOPHIL # BLD AUTO: 0.03 X10*3/UL (ref 0–0.7)
EOSINOPHIL NFR BLD AUTO: 0.2 %
ERYTHROCYTE [DISTWIDTH] IN BLOOD BY AUTOMATED COUNT: 12.4 % (ref 11.5–14.5)
GLUCOSE SERPL-MCNC: 148 MG/DL (ref 74–99)
GLUCOSE UR STRIP.AUTO-MCNC: NEGATIVE MG/DL
HCT VFR BLD AUTO: 39.4 % (ref 36–46)
HGB BLD-MCNC: 13.6 G/DL (ref 12–16)
HOLD SPECIMEN: NORMAL
IMM GRANULOCYTES # BLD AUTO: 0.04 X10*3/UL (ref 0–0.7)
IMM GRANULOCYTES NFR BLD AUTO: 0.3 % (ref 0–0.9)
KETONES UR STRIP.AUTO-MCNC: NEGATIVE MG/DL
LACTATE SERPL-SCNC: 1.1 MMOL/L (ref 0.4–2)
LEUKOCYTE ESTERASE UR QL STRIP.AUTO: NEGATIVE
LIPASE SERPL-CCNC: 18 U/L (ref 9–82)
LYMPHOCYTES # BLD AUTO: 1.56 X10*3/UL (ref 1.2–4.8)
LYMPHOCYTES NFR BLD AUTO: 12.8 %
MAGNESIUM SERPL-MCNC: 2.09 MG/DL (ref 1.6–2.4)
MCH RBC QN AUTO: 31.1 PG (ref 26–34)
MCHC RBC AUTO-ENTMCNC: 34.5 G/DL (ref 32–36)
MCV RBC AUTO: 90 FL (ref 80–100)
MONOCYTES # BLD AUTO: 1.15 X10*3/UL (ref 0.1–1)
MONOCYTES NFR BLD AUTO: 9.4 %
NEUTROPHILS # BLD AUTO: 9.38 X10*3/UL (ref 1.2–7.7)
NEUTROPHILS NFR BLD AUTO: 76.9 %
NITRITE UR QL STRIP.AUTO: NEGATIVE
NRBC BLD-RTO: 0 /100 WBCS (ref 0–0)
PH UR STRIP.AUTO: 7 [PH]
PLATELET # BLD AUTO: 277 X10*3/UL (ref 150–450)
POTASSIUM SERPL-SCNC: 3.5 MMOL/L (ref 3.5–5.3)
PROT SERPL-MCNC: 7.6 G/DL (ref 6.4–8.2)
PROT UR STRIP.AUTO-MCNC: NEGATIVE MG/DL
RBC # BLD AUTO: 4.37 X10*6/UL (ref 4–5.2)
RBC # UR STRIP.AUTO: NEGATIVE /UL
SODIUM SERPL-SCNC: 138 MMOL/L (ref 136–145)
SP GR UR STRIP.AUTO: 1
UROBILINOGEN UR STRIP.AUTO-MCNC: <2 MG/DL
WBC # BLD AUTO: 12.2 X10*3/UL (ref 4.4–11.3)

## 2024-05-03 PROCEDURE — 74177 CT ABD & PELVIS W/CONTRAST: CPT | Performed by: RADIOLOGY

## 2024-05-03 PROCEDURE — 36415 COLL VENOUS BLD VENIPUNCTURE: CPT | Performed by: PHYSICIAN ASSISTANT

## 2024-05-03 PROCEDURE — 83605 ASSAY OF LACTIC ACID: CPT | Performed by: PHYSICIAN ASSISTANT

## 2024-05-03 PROCEDURE — 99284 EMERGENCY DEPT VISIT MOD MDM: CPT | Mod: 25

## 2024-05-03 PROCEDURE — 96372 THER/PROPH/DIAG INJ SC/IM: CPT

## 2024-05-03 PROCEDURE — G0378 HOSPITAL OBSERVATION PER HR: HCPCS

## 2024-05-03 PROCEDURE — 81003 URINALYSIS AUTO W/O SCOPE: CPT | Performed by: PHYSICIAN ASSISTANT

## 2024-05-03 PROCEDURE — 99281 EMR DPT VST MAYX REQ PHY/QHP: CPT | Mod: 25

## 2024-05-03 PROCEDURE — 96375 TX/PRO/DX INJ NEW DRUG ADDON: CPT

## 2024-05-03 PROCEDURE — 2500000004 HC RX 250 GENERAL PHARMACY W/ HCPCS (ALT 636 FOR OP/ED)

## 2024-05-03 PROCEDURE — 85025 COMPLETE CBC W/AUTO DIFF WBC: CPT | Performed by: PHYSICIAN ASSISTANT

## 2024-05-03 PROCEDURE — 96374 THER/PROPH/DIAG INJ IV PUSH: CPT | Mod: 59

## 2024-05-03 PROCEDURE — 80053 COMPREHEN METABOLIC PANEL: CPT | Performed by: PHYSICIAN ASSISTANT

## 2024-05-03 PROCEDURE — 74177 CT ABD & PELVIS W/CONTRAST: CPT

## 2024-05-03 PROCEDURE — 99204 OFFICE O/P NEW MOD 45 MIN: CPT | Performed by: SURGERY

## 2024-05-03 PROCEDURE — 99285 EMERGENCY DEPT VISIT HI MDM: CPT | Mod: 25

## 2024-05-03 PROCEDURE — 2550000001 HC RX 255 CONTRASTS: Performed by: PHYSICIAN ASSISTANT

## 2024-05-03 PROCEDURE — 2500000004 HC RX 250 GENERAL PHARMACY W/ HCPCS (ALT 636 FOR OP/ED): Performed by: PHYSICIAN ASSISTANT

## 2024-05-03 PROCEDURE — 83735 ASSAY OF MAGNESIUM: CPT | Performed by: PHYSICIAN ASSISTANT

## 2024-05-03 PROCEDURE — 87040 BLOOD CULTURE FOR BACTERIA: CPT | Mod: 59,ELYLAB | Performed by: PHYSICIAN ASSISTANT

## 2024-05-03 PROCEDURE — 83690 ASSAY OF LIPASE: CPT | Performed by: PHYSICIAN ASSISTANT

## 2024-05-03 PROCEDURE — 96361 HYDRATE IV INFUSION ADD-ON: CPT

## 2024-05-03 RX ORDER — CLINDAMYCIN PHOSPHATE 900 MG/50ML
900 INJECTION, SOLUTION INTRAVENOUS EVERY 8 HOURS
Status: DISCONTINUED | OUTPATIENT
Start: 2024-05-03 | End: 2024-05-04

## 2024-05-03 RX ORDER — ENOXAPARIN SODIUM 100 MG/ML
40 INJECTION SUBCUTANEOUS EVERY 24 HOURS
Status: DISCONTINUED | OUTPATIENT
Start: 2024-05-03 | End: 2024-05-05

## 2024-05-03 RX ORDER — ACETAMINOPHEN 160 MG/5ML
650 SOLUTION ORAL EVERY 4 HOURS PRN
Status: DISCONTINUED | OUTPATIENT
Start: 2024-05-03 | End: 2024-05-07 | Stop reason: HOSPADM

## 2024-05-03 RX ORDER — ONDANSETRON 4 MG/1
4 TABLET, ORALLY DISINTEGRATING ORAL EVERY 8 HOURS PRN
Status: DISCONTINUED | OUTPATIENT
Start: 2024-05-03 | End: 2024-05-07 | Stop reason: HOSPADM

## 2024-05-03 RX ORDER — ONDANSETRON HYDROCHLORIDE 2 MG/ML
4 INJECTION, SOLUTION INTRAVENOUS ONCE
Status: COMPLETED | OUTPATIENT
Start: 2024-05-03 | End: 2024-05-03

## 2024-05-03 RX ORDER — ACETAMINOPHEN 325 MG/1
650 TABLET ORAL EVERY 4 HOURS PRN
Status: DISCONTINUED | OUTPATIENT
Start: 2024-05-03 | End: 2024-05-07 | Stop reason: HOSPADM

## 2024-05-03 RX ORDER — MORPHINE SULFATE 2 MG/ML
2 INJECTION, SOLUTION INTRAMUSCULAR; INTRAVENOUS ONCE
Status: COMPLETED | OUTPATIENT
Start: 2024-05-03 | End: 2024-05-03

## 2024-05-03 RX ORDER — ONDANSETRON HYDROCHLORIDE 2 MG/ML
4 INJECTION, SOLUTION INTRAVENOUS EVERY 8 HOURS PRN
Status: DISCONTINUED | OUTPATIENT
Start: 2024-05-03 | End: 2024-05-07 | Stop reason: HOSPADM

## 2024-05-03 RX ORDER — AZTREONAM 1 G/1
1 INJECTION, POWDER, LYOPHILIZED, FOR SOLUTION INTRAMUSCULAR; INTRAVENOUS EVERY 8 HOURS
Status: DISCONTINUED | OUTPATIENT
Start: 2024-05-03 | End: 2024-05-04

## 2024-05-03 RX ORDER — MORPHINE SULFATE 4 MG/ML
4 INJECTION, SOLUTION INTRAMUSCULAR; INTRAVENOUS ONCE
Status: COMPLETED | OUTPATIENT
Start: 2024-05-03 | End: 2024-05-03

## 2024-05-03 RX ORDER — SODIUM CHLORIDE, SODIUM LACTATE, POTASSIUM CHLORIDE, CALCIUM CHLORIDE 600; 310; 30; 20 MG/100ML; MG/100ML; MG/100ML; MG/100ML
100 INJECTION, SOLUTION INTRAVENOUS CONTINUOUS
Status: DISCONTINUED | OUTPATIENT
Start: 2024-05-03 | End: 2024-05-07 | Stop reason: HOSPADM

## 2024-05-03 RX ORDER — OXYCODONE AND ACETAMINOPHEN 5; 325 MG/1; MG/1
1 TABLET ORAL EVERY 4 HOURS PRN
Status: DISCONTINUED | OUTPATIENT
Start: 2024-05-03 | End: 2024-05-05

## 2024-05-03 RX ADMIN — ENOXAPARIN SODIUM 40 MG: 40 INJECTION SUBCUTANEOUS at 20:57

## 2024-05-03 RX ADMIN — ONDANSETRON 4 MG: 2 INJECTION INTRAMUSCULAR; INTRAVENOUS at 19:25

## 2024-05-03 RX ADMIN — CLINDAMYCIN PHOSPHATE 900 MG: 900 INJECTION, SOLUTION INTRAVENOUS at 22:16

## 2024-05-03 RX ADMIN — SODIUM CHLORIDE 1000 ML: 9 INJECTION, SOLUTION INTRAVENOUS at 10:38

## 2024-05-03 RX ADMIN — ONDANSETRON 4 MG: 2 INJECTION INTRAMUSCULAR; INTRAVENOUS at 10:39

## 2024-05-03 RX ADMIN — MORPHINE SULFATE 2 MG: 2 INJECTION, SOLUTION INTRAMUSCULAR; INTRAVENOUS at 10:39

## 2024-05-03 RX ADMIN — IOHEXOL 75 ML: 350 INJECTION, SOLUTION INTRAVENOUS at 11:15

## 2024-05-03 RX ADMIN — HYDROMORPHONE HYDROCHLORIDE 0.5 MG: 1 INJECTION, SOLUTION INTRAMUSCULAR; INTRAVENOUS; SUBCUTANEOUS at 22:19

## 2024-05-03 RX ADMIN — SODIUM CHLORIDE, SODIUM LACTATE, POTASSIUM CHLORIDE, AND CALCIUM CHLORIDE 100 ML/HR: 600; 310; 30; 20 INJECTION, SOLUTION INTRAVENOUS at 20:58

## 2024-05-03 RX ADMIN — AZTREONAM 1 G: 1 INJECTION, POWDER, LYOPHILIZED, FOR SOLUTION INTRAMUSCULAR; INTRAVENOUS at 23:07

## 2024-05-03 RX ADMIN — CEFEPIME 1 G: 1 INJECTION, POWDER, FOR SOLUTION INTRAMUSCULAR; INTRAVENOUS at 13:13

## 2024-05-03 RX ADMIN — MORPHINE SULFATE 4 MG: 4 INJECTION, SOLUTION INTRAMUSCULAR; INTRAVENOUS at 19:25

## 2024-05-03 SDOH — ECONOMIC STABILITY: INCOME INSECURITY: IN THE LAST 12 MONTHS, WAS THERE A TIME WHEN YOU WERE NOT ABLE TO PAY THE MORTGAGE OR RENT ON TIME?: NO

## 2024-05-03 SDOH — ECONOMIC STABILITY: HOUSING INSECURITY: IN THE LAST 12 MONTHS, HOW MANY PLACES HAVE YOU LIVED?: 1

## 2024-05-03 SDOH — SOCIAL STABILITY: SOCIAL INSECURITY: HAVE YOU HAD THOUGHTS OF HARMING ANYONE ELSE?: NO

## 2024-05-03 SDOH — SOCIAL STABILITY: SOCIAL INSECURITY: ARE THERE ANY APPARENT SIGNS OF INJURIES/BEHAVIORS THAT COULD BE RELATED TO ABUSE/NEGLECT?: NO

## 2024-05-03 SDOH — ECONOMIC STABILITY: INCOME INSECURITY: HOW HARD IS IT FOR YOU TO PAY FOR THE VERY BASICS LIKE FOOD, HOUSING, MEDICAL CARE, AND HEATING?: NOT VERY HARD

## 2024-05-03 SDOH — SOCIAL STABILITY: SOCIAL INSECURITY: DO YOU FEEL UNSAFE GOING BACK TO THE PLACE WHERE YOU ARE LIVING?: NO

## 2024-05-03 SDOH — SOCIAL STABILITY: SOCIAL INSECURITY: HAS ANYONE EVER THREATENED TO HURT YOUR FAMILY OR YOUR PETS?: NO

## 2024-05-03 SDOH — SOCIAL STABILITY: SOCIAL INSECURITY: ABUSE: ADULT

## 2024-05-03 SDOH — SOCIAL STABILITY: SOCIAL INSECURITY: HAVE YOU HAD ANY THOUGHTS OF HARMING ANYONE ELSE?: NO

## 2024-05-03 SDOH — SOCIAL STABILITY: SOCIAL INSECURITY: DOES ANYONE TRY TO KEEP YOU FROM HAVING/CONTACTING OTHER FRIENDS OR DOING THINGS OUTSIDE YOUR HOME?: NO

## 2024-05-03 SDOH — ECONOMIC STABILITY: HOUSING INSECURITY
IN THE LAST 12 MONTHS, WAS THERE A TIME WHEN YOU DID NOT HAVE A STEADY PLACE TO SLEEP OR SLEPT IN A SHELTER (INCLUDING NOW)?: NO

## 2024-05-03 SDOH — ECONOMIC STABILITY: TRANSPORTATION INSECURITY
IN THE PAST 12 MONTHS, HAS THE LACK OF TRANSPORTATION KEPT YOU FROM MEDICAL APPOINTMENTS OR FROM GETTING MEDICATIONS?: NO

## 2024-05-03 SDOH — ECONOMIC STABILITY: TRANSPORTATION INSECURITY
IN THE PAST 12 MONTHS, HAS LACK OF TRANSPORTATION KEPT YOU FROM MEETINGS, WORK, OR FROM GETTING THINGS NEEDED FOR DAILY LIVING?: NO

## 2024-05-03 SDOH — SOCIAL STABILITY: SOCIAL INSECURITY: WERE YOU ABLE TO COMPLETE ALL THE BEHAVIORAL HEALTH SCREENINGS?: YES

## 2024-05-03 SDOH — SOCIAL STABILITY: SOCIAL INSECURITY: DO YOU FEEL ANYONE HAS EXPLOITED OR TAKEN ADVANTAGE OF YOU FINANCIALLY OR OF YOUR PERSONAL PROPERTY?: NO

## 2024-05-03 SDOH — SOCIAL STABILITY: SOCIAL INSECURITY: ARE YOU OR HAVE YOU BEEN THREATENED OR ABUSED PHYSICALLY, EMOTIONALLY, OR SEXUALLY BY ANYONE?: NO

## 2024-05-03 ASSESSMENT — LIFESTYLE VARIABLES
HOW OFTEN DURING THE LAST YEAR HAVE YOU HAD A FEELING OF GUILT OR REMORSE AFTER DRINKING: NEVER
HOW OFTEN DURING THE LAST YEAR HAVE YOU FOUND THAT YOU WERE NOT ABLE TO STOP DRINKING ONCE YOU HAD STARTED: NEVER
EVER HAD A DRINK FIRST THING IN THE MORNING TO STEADY YOUR NERVES TO GET RID OF A HANGOVER: NO
AUDIT TOTAL SCORE: 2
HAVE YOU EVER FELT YOU SHOULD CUT DOWN ON YOUR DRINKING: NO
AUDIT-C TOTAL SCORE: 2
HAVE PEOPLE ANNOYED YOU BY CRITICIZING YOUR DRINKING: NO
EVER HAD A DRINK FIRST THING IN THE MORNING TO STEADY YOUR NERVES TO GET RID OF A HANGOVER: NO
HOW MANY STANDARD DRINKS CONTAINING ALCOHOL DO YOU HAVE ON A TYPICAL DAY: 1 OR 2
HAVE PEOPLE ANNOYED YOU BY CRITICIZING YOUR DRINKING: NO
SKIP TO QUESTIONS 9-10: 1
HOW OFTEN DURING THE LAST YEAR HAVE YOU FAILED TO DO WHAT WAS NORMALLY EXPECTED FROM YOU BECAUSE OF DRINKING: NEVER
EVER FELT BAD OR GUILTY ABOUT YOUR DRINKING: NO
HAS A RELATIVE, FRIEND, DOCTOR, OR ANOTHER HEALTH PROFESSIONAL EXPRESSED CONCERN ABOUT YOUR DRINKING OR SUGGESTED YOU CUT DOWN: NO
TOTAL SCORE: 0
EVER FELT BAD OR GUILTY ABOUT YOUR DRINKING: NO
TOTAL SCORE: 0
AUDIT TOTAL SCORE: 0
HOW OFTEN DURING THE LAST YEAR HAVE YOU BEEN UNABLE TO REMEMBER WHAT HAPPENED THE NIGHT BEFORE BECAUSE YOU HAD BEEN DRINKING: NEVER
HOW OFTEN DURING THE LAST YEAR HAVE YOU NEEDED AN ALCOHOLIC DRINK FIRST THING IN THE MORNING TO GET YOURSELF GOING AFTER A NIGHT OF HEAVY DRINKING: NEVER
HOW OFTEN DO YOU HAVE A DRINK CONTAINING ALCOHOL: 2-4 TIMES A MONTH
HOW OFTEN DO YOU HAVE 6 OR MORE DRINKS ON ONE OCCASION: NEVER
HAVE YOU OR SOMEONE ELSE BEEN INJURED AS A RESULT OF YOUR DRINKING: NO
AUDIT-C TOTAL SCORE: 2
HAVE YOU EVER FELT YOU SHOULD CUT DOWN ON YOUR DRINKING: NO

## 2024-05-03 ASSESSMENT — ENCOUNTER SYMPTOMS
MUSCULOSKELETAL NEGATIVE: 1
RESPIRATORY NEGATIVE: 1
ANAL BLEEDING: 1
NEUROLOGICAL NEGATIVE: 1
CONSTIPATION: 1
CARDIOVASCULAR NEGATIVE: 1
ABDOMINAL PAIN: 1
EYES NEGATIVE: 1
UNEXPECTED WEIGHT CHANGE: 0

## 2024-05-03 ASSESSMENT — ACTIVITIES OF DAILY LIVING (ADL)
ADEQUATE_TO_COMPLETE_ADL: YES
FEEDING YOURSELF: INDEPENDENT
TOILETING: INDEPENDENT
HEARING - RIGHT EAR: FUNCTIONAL
HEARING - LEFT EAR: FUNCTIONAL
DRESSING YOURSELF: INDEPENDENT
GROOMING: INDEPENDENT
BATHING: INDEPENDENT
WALKS IN HOME: INDEPENDENT
PATIENT'S MEMORY ADEQUATE TO SAFELY COMPLETE DAILY ACTIVITIES?: YES
JUDGMENT_ADEQUATE_SAFELY_COMPLETE_DAILY_ACTIVITIES: YES
ASSISTIVE_DEVICE: EYEGLASSES

## 2024-05-03 ASSESSMENT — COGNITIVE AND FUNCTIONAL STATUS - GENERAL
PATIENT BASELINE BEDBOUND: NO
DAILY ACTIVITIY SCORE: 24
MOBILITY SCORE: 24

## 2024-05-03 ASSESSMENT — PAIN SCALES - GENERAL
PAINLEVEL_OUTOF10: 8
PAINLEVEL_OUTOF10: 6
PAINLEVEL_OUTOF10: 3
PAINLEVEL_OUTOF10: 8
PAINLEVEL_OUTOF10: 0 - NO PAIN
PAINLEVEL_OUTOF10: 10 - WORST POSSIBLE PAIN
PAINLEVEL_OUTOF10: 8

## 2024-05-03 ASSESSMENT — PATIENT HEALTH QUESTIONNAIRE - PHQ9
1. LITTLE INTEREST OR PLEASURE IN DOING THINGS: NOT AT ALL
SUM OF ALL RESPONSES TO PHQ9 QUESTIONS 1 & 2: 0
2. FEELING DOWN, DEPRESSED OR HOPELESS: NOT AT ALL

## 2024-05-03 ASSESSMENT — COLUMBIA-SUICIDE SEVERITY RATING SCALE - C-SSRS
6. HAVE YOU EVER DONE ANYTHING, STARTED TO DO ANYTHING, OR PREPARED TO DO ANYTHING TO END YOUR LIFE?: NO
1. IN THE PAST MONTH, HAVE YOU WISHED YOU WERE DEAD OR WISHED YOU COULD GO TO SLEEP AND NOT WAKE UP?: NO
2. HAVE YOU ACTUALLY HAD ANY THOUGHTS OF KILLING YOURSELF?: NO

## 2024-05-03 ASSESSMENT — PAIN DESCRIPTION - DESCRIPTORS: DESCRIPTORS: CRAMPING

## 2024-05-03 ASSESSMENT — PAIN DESCRIPTION - PROGRESSION: CLINICAL_PROGRESSION: NOT CHANGED

## 2024-05-03 ASSESSMENT — PAIN - FUNCTIONAL ASSESSMENT
PAIN_FUNCTIONAL_ASSESSMENT: 0-10

## 2024-05-03 ASSESSMENT — PAIN DESCRIPTION - ORIENTATION: ORIENTATION: RIGHT;LEFT

## 2024-05-03 ASSESSMENT — PAIN DESCRIPTION - LOCATION: LOCATION: ABDOMEN

## 2024-05-03 NOTE — ED PROVIDER NOTES
HPI   Chief Complaint   Patient presents with    Abdominal Pain       This is a 64-year-old female with PMH diverticular disease, seen here in the ER earlier today, had CT scan showing diverticulitis with 16mm intramural abscess, signed out AMA due to personal reasons, re-presenting for hospitalization.  Plan was to hospitalize her earlier today.  She received IV cefepime in the emergency department.  She received laboratory evaluation at that time.  She does report pain similar to her presentation earlier today.  Denies any new symptoms, vomiting, fevers, chills.                           Harsh Coma Scale Score: 15                     Patient History   Past Medical History:   Diagnosis Date    Diverticulitis     Personal history of other diseases of the musculoskeletal system and connective tissue 11/06/2014    History of osteoarthritis    Personal history of other endocrine, nutritional and metabolic disease     History of hyperlipidemia    Personal history of other endocrine, nutritional and metabolic disease 11/06/2014    History of hypothyroidism     Past Surgical History:   Procedure Laterality Date    BACK SURGERY  08/31/2017    Back Surgery    DILATION AND CURETTAGE OF UTERUS  11/06/2014    Dilation And Curettage    HYSTERECTOMY  10/30/2017    Hysterectomy     Family History   Problem Relation Name Age of Onset    Liver cancer Father      Depression Sister      Arthritis Sister      Asthma Brother      Depression Daughter       Social History     Tobacco Use    Smoking status: Former     Types: Cigarettes    Smokeless tobacco: Never   Vaping Use    Vaping status: Never Used   Substance Use Topics    Alcohol use: Yes    Drug use: Never       Physical Exam   ED Triage Vitals   Temperature Heart Rate Respirations BP   05/03/24 1813 05/03/24 1813 05/03/24 1813 05/03/24 1815   36.6 °C (97.9 °F) 84 18 (!) 140/105      Pulse Ox Temp Source Heart Rate Source Patient Position   05/03/24 1813 05/03/24 1813 05/03/24  1813 05/03/24 1813   95 % Temporal Monitor Sitting      BP Location FiO2 (%)     05/03/24 1813 --     Left arm        Physical Exam    General: Vitals noted. Afebrile. No apparent distress  EENT: Sclerae anicteric  Cardiac: Regular rate and rhythm. No murmur  Pulmonary: Lungs clear bilaterally with good aeration  Abdomen: Soft. TTP bilateral lower abdomen. No rebound. No guarding    ED Course & [facility]   ED Course as of 05/03/24 1904   Fri May 03, 2024   1852 HPI      Patient presenting for evaluation of abdominal pain.  Patient was evaluated earlier today in the ED and found to have diverticulitis with intramural abscess.  Patient had to leave AGAINST MEDICAL ADVICE at that time but agreed to return to ED.         Physical Exam     Appearance: Awake and alert, not in distress.  Skin: No rash or lesions, warm and dry  Eyes: Pupils equal and reactive, EOMI  ENT: Mucous membranes moist dentition without lesions. Pharynx clear, uvula midline.  Neck: Supple, no meningeal signs.  Pulmonary: CTA bilaterally with good air movement  Cardiac: Normal rate, regular rhythm.  Radial and DP Pulses equal BL  Abdomen: Soft, left lower quadrant tenderness  Genitourinary: No flank tenderness.  Musculoskeletal: No signs of trauma, strong pulses.  No tenderness along the venous system.  Neurological: Clear speech, NIH 0. CN II-XII grossly intact, no focal neuro deficit  Psychiatric: Appropriate mood and affect.        Medical Decision Making:       Intramural abscess with diverticulitis on CT from earlier today.  Patient agrees with plan of admission.      I personally saw the patient and made/approved the management plan and take responsibility for the patient management.        Disclaimer: This note was dictated by speech recognition. Minor errors in transcription may be present.  Please call if questions.   [JA]      ED Course User Index  [JA] Jordan Troy DO         Diagnoses as of 05/03/24 1904   Diverticulitis of intestine with  abscess without bleeding, unspecified part of intestinal tract       Medical Decision Making  Discussed with the hospitalist Dr. Marinelli, stated already discussed with Dr. Walton who will accept the patient to his service.  This visit was staffed with the attending physician Dr. Troy.      Disclaimer: This note was dictated using speech recognition software. An attempt at proofreading was made to minimize errors. Minor errors in transcription may be present. Please call if questions.        Procedure  Procedures     Luis Eduardo Almanza PA-C  05/03/24 [id number]

## 2024-05-03 NOTE — CONSULTS
"Consults  Consulted by Luis Eduardo Almanza PA-C  Reason: Diverticulitis with abscess  General Surgery Consult Note  Patient: Sarah Walker  Unit/Bed: AC21/AC21  YOB: 1960  MRN: [id number]  Acct: [id number]   Admitting Diagnosis: No admission diagnoses are documented for this encounter.  Date:  5/3/2024  Hospital Day: 0  Attending: No att. providers found    Complaint:  Chief Complaint   Patient presents with    Constipation     \"Here for constipation and stomach pain.\"      History of Present Illness:  64-year-old female with history of diverticulitis presented for evaluation of bilateral lower abdominal pain and constipation for the past 6 days.  She states pain has been severe, worse with bowel movements, and waxing and waning in intensity.  She states she took milk of magnesia on Monday but it did not relieve her pain.  She also has had small specks of blood with bowel movements that her doctor told her are from hemorrhoids.  She denies any unexplained weight loss or any other symptoms.  She does state that she has had issues with hives since November and is seeing an allergist.  She received a steroid shot on April.  She has many allergies to antibiotics and carries an EpiPen.  She quit smoking a couple years ago.  She has history of laparotomy for abdominal pain, hysterectomy, and last colonoscopy in 2020 with no significant findings.  She states she has family history of blood clots.  She is not on anticoagulation.    In the ER, she had a white count of 12.2 and negative lactate.  CT scan showed diverticulitis with 16 mm intramural abscess.  General surgery was consulted for diverticulitis with abscess.  Allergies:  Allergies   Allergen Reactions    Ciprofloxacin Hives and Other    Metronidazole Hives and Unknown    Penicillins Hives and Other    Sulfamethoxazole-Trimethoprim Hives and Unknown    Atorvastatin Rash and Other      PMHx:  Past Medical History:   Diagnosis Date    Diverticulitis     " Personal history of other diseases of the musculoskeletal system and connective tissue 11/06/2014    History of osteoarthritis    Personal history of other endocrine, nutritional and metabolic disease     History of hyperlipidemia    Personal history of other endocrine, nutritional and metabolic disease 11/06/2014    History of hypothyroidism     PSHx:  Past Surgical History:   Procedure Laterality Date    BACK SURGERY  08/31/2017    Back Surgery    DILATION AND CURETTAGE OF UTERUS  11/06/2014    Dilation And Curettage    HYSTERECTOMY  10/30/2017    Hysterectomy     Social Hx:  Social History     Socioeconomic History    Marital status: Single     Spouse name: None    Number of children: None    Years of education: None    Highest education level: None   Occupational History     Employer: NONE   Tobacco Use    Smoking status: Former     Types: Cigarettes    Smokeless tobacco: Never   Vaping Use    Vaping status: Never Used   Substance and Sexual Activity    Alcohol use: Yes    Drug use: Never    Sexual activity: None   Other Topics Concern    None   Social History Narrative    None     Social Determinants of Health     Financial Resource Strain: Not on file   Food Insecurity: Not on file   Transportation Needs: Not on file   Physical Activity: Not on file   Stress: Not on file   Social Connections: Not on file   Intimate Partner Violence: Not on file   Housing Stability: Not on file     Family Hx:  Family History   Problem Relation Name Age of Onset    Liver cancer Father      Depression Sister      Arthritis Sister      Asthma Brother      Depression Daughter       Review of Systems:   Review of Systems   Constitutional:  Negative for unexpected weight change.   HENT: Negative.     Eyes: Negative.    Respiratory: Negative.     Cardiovascular: Negative.    Gastrointestinal:  Positive for abdominal pain, anal bleeding and constipation.   Genitourinary: Negative.    Musculoskeletal: Negative.    Skin: Negative.     Neurological: Negative.      Physical Examination:    Visit Vitals  /83 (BP Location: Left arm, Patient Position: Sitting)   Pulse 76   Temp 36.3 °C (97.3 °F) (Temporal)   Resp 18      Physical Exam  Constitutional:       General: She is not in acute distress.  HENT:      Head: Normocephalic and atraumatic.      Mouth/Throat:      Mouth: Mucous membranes are moist.   Eyes:      Conjunctiva/sclera: Conjunctivae normal.   Pulmonary:      Effort: Pulmonary effort is normal. No respiratory distress.   Abdominal:      General: Abdomen is flat. There is no distension.      Palpations: Abdomen is soft.      Tenderness: There is abdominal tenderness (LLQ).   Musculoskeletal:         General: No swelling.   Skin:     General: Skin is warm and dry.   Neurological:      Mental Status: She is alert.   Psychiatric:         Mood and Affect: Mood normal.         Behavior: Behavior normal.       LABS:  CBC:   Lab Results   Component Value Date    WBC 12.2 (H) 05/03/2024    RBC 4.37 05/03/2024    HGB 13.6 05/03/2024    HCT 39.4 05/03/2024    MCV 90 05/03/2024    MCH 31.1 05/03/2024    MCHC 34.5 05/03/2024    RDW 12.4 05/03/2024     05/03/2024     CBC with Differential:    Lab Results   Component Value Date    WBC 12.2 (H) 05/03/2024    RBC 4.37 05/03/2024    HGB 13.6 05/03/2024    HCT 39.4 05/03/2024     05/03/2024    MCV 90 05/03/2024    MCH 31.1 05/03/2024    MCHC 34.5 05/03/2024    RDW 12.4 05/03/2024    NRBC 0.0 05/03/2024    LYMPHOPCT 12.8 05/03/2024    MONOPCT 9.4 05/03/2024    EOSPCT 0.2 05/03/2024    BASOPCT 0.4 05/03/2024    MONOSABS 1.15 (H) 05/03/2024    LYMPHSABS 1.56 05/03/2024    EOSABS 0.03 05/03/2024    BASOSABS 0.05 05/03/2024     CMP:    Lab Results   Component Value Date     05/03/2024    K 3.5 05/03/2024     05/03/2024    CO2 25 05/03/2024    BUN 9 05/03/2024    CREATININE 0.44 (L) 05/03/2024    GLUCOSE 148 (H) 05/03/2024    PROT 7.6 05/03/2024    CALCIUM 9.5 05/03/2024     "BILITOT 0.5 05/03/2024    ALKPHOS 93 05/03/2024    AST 16 05/03/2024    ALT 20 05/03/2024     BMP:    Lab Results   Component Value Date     05/03/2024    K 3.5 05/03/2024     05/03/2024    CO2 25 05/03/2024    BUN 9 05/03/2024    CREATININE 0.44 (L) 05/03/2024    CALCIUM 9.5 05/03/2024    GLUCOSE 148 (H) 05/03/2024     Magnesium:  Lab Results   Component Value Date    MG 2.09 05/03/2024     Troponin:  No results found for: \"TROPHS\"  Lipid Panel:  No results found for: \"HDL\", \"CHHDL\", \"VLDL\", \"TRIG\", \"NHDL\"   Current Medications:    Current Facility-Administered Medications:     sodium chloride 0.9 % bolus 1,000 mL, 1,000 mL, intravenous, Once, Luis Eduardo Almanza PA-C    triamcinolone acetonide (Kenalog-40) injection 40 mg, 40 mg, intramuscular, Once, Panfilo Christina, DO    Current Outpatient Medications:     albuterol 90 mcg/actuation inhaler, Inhale 1 puff 3 times a day., Disp: , Rfl:     chlorhexidine (Peridex) 0.12 % solution, Use 15 mL in the mouth or throat if needed., Disp: , Rfl:     cholecalciferol (Vitamin D-3) 50 MCG (2000 UT) tablet, Take 1 tablet (2,000 Units) by mouth once daily., Disp: , Rfl:     clobetasol (Temovate) 0.05 % ointment, apply to affected area twice a day if needed, Disp: 80 g, Rfl: 0    cyclobenzaprine (Flexeril) 10 mg tablet, Take 1 tablet (10 mg) by mouth as needed at bedtime., Disp: , Rfl:     doxepin (SINEquan) 25 mg capsule, Take 1 capsule (25 mg) by mouth once daily at bedtime., Disp: , Rfl:     EPINEPHrine 0.3 mg/0.3 mL injection syringe, Inject 0.3 mL (0.3 mg) into the muscle 1 time if needed for anaphylaxis for up to 1 dose., Disp: 2 each, Rfl: 2    famotidine (Pepcid) 20 mg tablet, Take 1 tablet (20 mg) by mouth twice a day., Disp: , Rfl:     fexofenadine (Allegra) 180 mg tablet, Take one tablet in the AM, Disp: 30 tablet, Rfl: 3    fluticasone (Flonase) 50 mcg/actuation nasal spray, Administer 2 sprays into each nostril once daily., Disp: 16 g, Rfl: 1    " hydrocortisone 2.5 % cream, apply to affected area twice a day if needed for 2 weeks ONE WEEK OFF, Disp: , Rfl:     hydrOXYzine HCL (Atarax) 25 mg tablet, Take 1 tablet (25 mg) by mouth every 8 hours if needed for itching., Disp: 30 tablet, Rfl: 1    hydrOXYzine pamoate (Vistaril) 25 mg capsule, Take 1 capsule (25 mg) by mouth once daily., Disp: , Rfl:     latanoprost (Xalatan) 0.005 % ophthalmic solution, Administer 1 drop into affected eye(s) once daily., Disp: , Rfl:     levocetirizine (Xyzal) 5 mg tablet, Take 1 tablet at bedtime, Disp: 30 tablet, Rfl: 3    loratadine (Claritin) 10 mg tablet, Take 1 tablet (10 mg) by mouth twice a day., Disp: , Rfl:     montelukast (Singulair) 10 mg tablet, Take 1 tablet (10 mg) by mouth once daily at bedtime., Disp: , Rfl:     Mucinex 600 mg 12 hr tablet, Take 1 tablet (600 mg) by mouth 2 times a day., Disp: , Rfl:     simvastatin (Zocor) 40 mg tablet, Take 1 tablet (40 mg) by mouth once daily at bedtime., Disp: 90 tablet, Rfl: 3    timolol (Timoptic) 0.5 % ophthalmic solution, Administer 1 drop into both eyes 2 times a day., Disp: , Rfl:   CT abdomen pelvis w IV contrast    Result Date: 5/3/2024  Interpreted By:  Tamika Durham, STUDY: CT ABDOMEN PELVIS W IV CONTRAST;  5/3/2024 11:20 am   INDICATION: Signs/Symptoms:very tender bilateral lower quadrants, constipation, hx diverticular disease.   COMPARISON: 04/04/2022   ACCESSION NUMBER(S): [id number]   ORDERING CLINICIAN: ALESSIO BERG   TECHNIQUE: CT of the abdomen and pelvis was performed following the intravenous administration of 75 mL Omnipaque 350. Sagittal and coronal reconstructions were generated.   FINDINGS: LOWER CHEST: Unremarkable   ABDOMEN:   LIVER: Unremarkable   BILE DUCTS: Unremarkable   GALLBLADDER: There are no obvious gallstones.   PANCREAS: Unremarkable   SPLEEN: Unremarkable   ADRENAL GLANDS: There are no adrenal masses.   KIDNEYS AND URETERS: Kidneys are not obstructed. There are renal cortical cysts.    There are no renal calculi. The ureters are normal in caliber.   PELVIS:   BLADDER: Unremarkable   REPRODUCTIVE ORGANS: Patient is status post hysterectomy.   BOWEL: There is no significant bowel distention.   There are significant sigmoid diverticula. There is thickening of the wall of sigmoid colon. There is stranding of the adjacent fat. There is a question of a small intramural abscess measuring 16 mm.   There is a normal caliber air-containing appendix.   VESSELS: There are atherosclerotic changes of the aorta. The cava is unremarkable   PERITONEUM/RETROPERITONEUM/LYMPH NODES: There is no evidence of free air.   There is no significant free fluid.   There is no significant lymphadenopathy.   BONES AND ABDOMINAL WALL: There are degenerative changes of the spine and hips. Patient is scoliotic.   COMPARISON OF FINDINGS: The diverticulitis is new.       Diverticulosis with sigmoid diverticulitis. Suggestion of a small intramural abscess.   Stranding of the adjacent fat.   MACRO: none   Signed by: Tamika Durham 5/3/2024 11:41 AM Dictation workstation:   [id number]     No results found for this or any previous visit from the past 1095 days.           Assessment:    64-year-old female with history of diverticulitis presented to the ER with abdominal pain and constipation for 6 days.  CT showed diverticulitis with 16 mm intramural abscess.  General surgery was consulted for diverticulitis with abscess.  On exam, the patient has tenderness palpation in the left lower quadrant.  She is allergic to many antibiotics, which makes antibiotic choice for treatment more difficult.  The patient was seen with Dr. Walton, who advised that the patient be admitted for IV antibiotics with infectious disease consultation.  The patient states she has to  her daughter after surgery and take care of her dog at home, so she wants to be discharged and come back later.  She was advised against this, risks were discussed with   Anton.  Physician assistant taking care of the patient in the ER was made aware.      Plan:  -If patient were to be admitted:  -NPO, advance diet as tolerated  -IVF  -Pain control  -Nausea control  -Infectious disease consult for antibiotic regimen  -DVT prophylaxis    Further recommendations per Dr. Walton     Time spent  60  minutes obtaining labs, imaging, recommendations, interview, assessment, examination, medication review/ordering, and EMR review.    Plan of care was discussed extensively with patient. Patient verbalized understanding through teach back method. All questions and concerns addressed upon examination.     Of note, this documentation is completed using the Dragon Dictation system (voice recognition software). There may be spelling and/or grammatical errors that were not corrected prior to final submission.    Electronically signed by Amanda Hagen PA-C on 5/3/2024 at 2:42 PM

## 2024-05-03 NOTE — ED PROVIDER NOTES
"HPI   Chief Complaint   Patient presents with    Constipation     \"Here for constipation and stomach pain.\"       This is a 64-year-old female with PMH diverticular disease presenting for evaluation of bilateral lower abdominal pain and constipation for the past 6 days.  Has been passing soft nonbloody stool with worsening abdominal pain and increasing difficulty tolerating p.o.  Abdominal pain is bilateral lower quadrants no obvious aggravating alleviating factors waxing and waning in intensity and is sharp and intermittently dull. Denies fevers, chills, chest pain, shortness of breath, hematochezia, melena, urinary symptoms, vaginal symptoms.      History provided by:  Patient   used: No                        Hugo Coma Scale Score: 15                     Patient History   Past Medical History:   Diagnosis Date    Diverticulitis     Personal history of other diseases of the musculoskeletal system and connective tissue 11/06/2014    History of osteoarthritis    Personal history of other endocrine, nutritional and metabolic disease     History of hyperlipidemia    Personal history of other endocrine, nutritional and metabolic disease 11/06/2014    History of hypothyroidism     Past Surgical History:   Procedure Laterality Date    BACK SURGERY  08/31/2017    Back Surgery    DILATION AND CURETTAGE OF UTERUS  11/06/2014    Dilation And Curettage    HYSTERECTOMY  10/30/2017    Hysterectomy     Family History   Problem Relation Name Age of Onset    Liver cancer Father      Depression Sister      Arthritis Sister      Asthma Brother      Depression Daughter       Social History     Tobacco Use    Smoking status: Former     Types: Cigarettes    Smokeless tobacco: Never   Vaping Use    Vaping status: Never Used   Substance Use Topics    Alcohol use: Yes    Drug use: Never       Physical Exam   ED Triage Vitals [05/03/24 0946]   Temperature Heart Rate Respirations BP   36.3 °C (97.3 °F) 95 18 157/84    "   Pulse Ox Temp Source Heart Rate Source Patient Position   98 % Temporal Monitor Sitting      BP Location FiO2 (%)     Right arm --       Physical Exam    General: Vitals noted. Afebrile.  Appears uncomfortable.  EENT: Sclerae anicteric  Cardiac: Regular rate and rhythm. No murmur  Pulmonary: Lungs clear bilaterally with good aeration  Abdomen: Soft.  Significant TTP bilateral lower quadrants.  Guarding.  No rebound.  : No CVA tenderness. exam deferred  Extremities: DEY normally  Skin: No rash on abdomen  Neuro: Alert and oriented    ED Course & MDM   ED Course as of 05/03/24 1411   Fri May 03, 2024   1220 Basophils Absolute: 0.05 [JA]   1239 HPI   Patient presented for evaluation of constipation and abdominal pain.  Patient states she has had almost 1 week of constipation.  Patient started to develop lower abdominal pain.  Patient notes she has a history of diverticulitis.         Physical Exam     Appearance: Awake and alert, not in distress.  Skin: No rash or lesions, warm and dry  Eyes: Pupils equal and reactive, EOMI  ENT: Mucous membranes moist dentition without lesions. Pharynx clear, uvula midline.  Neck: Supple, no meningeal signs.  Pulmonary: CTA bilaterally with good air movement  Cardiac: Normal rate, regular rhythm.  Radial and DP Pulses equal BL  Abdomen: Soft, left lower quadrant tenderness  Genitourinary: No flank tenderness.  Musculoskeletal: No signs of trauma, strong pulses.  No tenderness along the venous system.  Neurological: Clear speech, NIH 0. CN II-XII grossly intact, no focal neuro deficit  Psychiatric: Appropriate mood and affect.        Medical Decision Making:       CT scan showing concern for diverticulitis with intramural abscess.  Mild leukocytosis.  Patient notes allergies to penicillin, Bactrim, Cipro and Flagyl.  Patient did take Flagyl at the same time as Bactrim thus it is unclear as to whether the patient truly had a reaction.  Patient is seeing an allergist.  General  surgery at bedside.      I personally saw the patient and made/approved the management plan and take responsibility for the patient management.    I independently interpreted the [study: EKG, X-ray, CT, U/S, etc.], which showed [interpretation].  I personally discussed the patient's management with [name and profession/specialty], who stated [summary].      Disclaimer: This note was dictated by speech recognition. Minor errors in transcription may be present.  Please call if questions.   [JA]      ED Course User Index  [JA] Jordan Troy DO         Diagnoses as of 05/03/24 1411   Diverticulitis of intestine with abscess without bleeding, unspecified part of intestinal tract       Medical Decision Making  DDx: Colitis, cholecystitis, pancreatitis, GERD, obstruction, ureterolithiasis, pyelonephritis    Physical exam as above.  Uncomfortable appearing.  Guarding, significantly tender to palpation the bilateral lower quadrants.  Stable vital signs.  Laboratory evaluation kidney Ficken for a white count of 12.2, elevated neutrophils, stable H&H, creatinine 0.44, glucose 148, otherwise reassuring laboratory evaluation, negative lactate, CT scan showing diverticulitis with 16mm intramural abscess as read by the radiologist.  Patient was given IV normal saline, IV morphine, IV Zofran.  I discussed the case with Dr. Walton general surgery who came and evaluated the patient here in the ER.  The patient states she needs to pick her sister up from a procedure and take care of her dog.  She has multiple antibiotic allergies.  I discussed the case with the pharmacist who recommended giving cefepime and Flagyl.  Patient was agreeable to getting cefepime.  She was willing to get cultures obtained and she received IV cefepime.  She still wants to leave at this time.  Patient was advised will likely have worsening pain and possibly development of fevers and sepsis symptoms and was advised strongly that she should not leave the  emergency department.  Patient still wants to leave at this time but states she will come back.  Patient will sign out AMA.  This visit was staffed with the attending physician Dr. Troy.      Disclaimer: This note was dictated using speech recognition software. An attempt at proofreading was made to minimize errors. Minor errors in transcription may be present. Please call if questions.    Amount and/or Complexity of Data Reviewed  Labs: ordered.  Radiology: ordered.        Procedure  Procedures     Luis Eduardo Almanza PA-C  05/03/24 4810

## 2024-05-03 NOTE — DISCHARGE INSTRUCTIONS
Please come back to the ER if you have any worsening pain, fevers, chills, nausea, vomiting, chest pain, shortness of breath or if you have any concerns or new or worsening symptoms

## 2024-05-03 NOTE — H&P
History Of Present Illness  Sarah Walker is a 64 y.o. female with history of diverticulitis presented for evaluation of bilateral lower abdominal pain and constipation for the past 6 days.  She states pain has been severe, worse with bowel movements, and waxing and waning in intensity.  She states she took milk of magnesia on Monday but it did not relieve her pain.  She also has had small specks of blood with bowel movements that her doctor told her are from hemorrhoids.  She denies any unexplained weight loss or any other symptoms.  She does state that she has had issues with hives since November and is seeing an allergist.  She received a steroid shot on April.  She has many allergies to antibiotics and carries an EpiPen.  She quit smoking a couple years ago.  She has history of laparotomy for abdominal pain, hysterectomy, and last colonoscopy in 2020 with no significant findings.  She states she has family history of blood clots.  She is not on anticoagulation.     In the ER, she had a white count of 12.2 and negative lactate.  CT scan showed diverticulitis with 16 mm intramural abscess.  General surgery was consulted for diverticulitis with abscess.     Past Medical History  She has a past medical history of Diverticulitis, Personal history of other diseases of the musculoskeletal system and connective tissue (11/06/2014), Personal history of other endocrine, nutritional and metabolic disease, and Personal history of other endocrine, nutritional and metabolic disease (11/06/2014).    Surgical History  She has a past surgical history that includes Dilation and curettage of uterus (11/06/2014); Back surgery (08/31/2017); and Hysterectomy (10/30/2017).     Social History  She reports that she has quit smoking. Her smoking use included cigarettes. She has never used smokeless tobacco. She reports current alcohol use. She reports that she does not use drugs.    Family History  Family History   Problem Relation  Name Age of Onset    Liver cancer Father      Depression Sister      Arthritis Sister      Asthma Brother      Depression Daughter          Allergies  Ciprofloxacin, Metronidazole, Penicillins, Sulfamethoxazole-trimethoprim, and Atorvastatin    Review of Systems:   Review of Systems   Constitutional:  Negative for unexpected weight change.   HENT: Negative.     Eyes: Negative.    Respiratory: Negative.     Cardiovascular: Negative.    Gastrointestinal:  Positive for abdominal pain, anal bleeding and constipation.   Genitourinary: Negative.    Musculoskeletal: Negative.    Skin: Negative.    Neurological: Negative.       Physical Exam  Constitutional:       General: She is not in acute distress.  HENT:      Head: Normocephalic and atraumatic.      Mouth/Throat:      Mouth: Mucous membranes are moist.   Eyes:      Conjunctiva/sclera: Conjunctivae normal.   Pulmonary:      Effort: Pulmonary effort is normal. No respiratory distress.   Abdominal:      General: Abdomen is flat. There is no distension.      Palpations: Abdomen is soft.      Tenderness: There is abdominal tenderness (LLQ).   Musculoskeletal:         General: No swelling.   Skin:     General: Skin is warm and dry.   Neurological:      Mental Status: She is alert.   Psychiatric:         Mood and Affect: Mood normal.         Behavior: Behavior normal.      Last Recorded Vitals  BP (!) 140/105   Pulse 84   Temp 36.6 °C (97.9 °F) (Temporal)   Resp 18   Wt 68 kg (150 lb)   SpO2 95%     Relevant Results  CBC:   Results from last 7 days   Lab Units 05/03/24  1027   WBC AUTO x10*3/uL 12.2*   RBC AUTO x10*6/uL 4.37   HEMOGLOBIN g/dL 13.6   HEMATOCRIT % 39.4   MCV fL 90   MCH pg 31.1   MCHC g/dL 34.5   RDW % 12.4   PLATELETS AUTO x10*3/uL 277     CMP:    Results from last 7 days   Lab Units 05/03/24  1027   SODIUM mmol/L 138   POTASSIUM mmol/L 3.5   CHLORIDE mmol/L 105   CO2 mmol/L 25   BUN mg/dL 9   CREATININE mg/dL 0.44*   GLUCOSE mg/dL 148*   PROTEIN TOTAL  g/dL 7.6   CALCIUM mg/dL 9.5   BILIRUBIN TOTAL mg/dL 0.5   ALK PHOS U/L 93   AST U/L 16   ALT U/L 20     BMP:    Results from last 7 days   Lab Units 05/03/24  1027   SODIUM mmol/L 138   POTASSIUM mmol/L 3.5   CHLORIDE mmol/L 105   CO2 mmol/L 25   BUN mg/dL 9   CREATININE mg/dL 0.44*   CALCIUM mg/dL 9.5   GLUCOSE mg/dL 148*     Magnesium:  Results from last 7 days   Lab Units 05/03/24  1027   MAGNESIUM mg/dL 2.09     Troponin:      BNP:     Lipid Panel:    Imagining  CT abdomen pelvis w IV contrast  Narrative: Interpreted By:  Tamika Durham,   STUDY:  CT ABDOMEN PELVIS W IV CONTRAST;  5/3/2024 11:20 am      INDICATION:  Signs/Symptoms:very tender bilateral lower quadrants, constipation,  hx diverticular disease.      COMPARISON:  04/04/2022      ACCESSION NUMBER(S):  [id number]      ORDERING CLINICIAN:  ALESSIO WIRE      TECHNIQUE:  CT of the abdomen and pelvis was performed following the intravenous  administration of 75 mL Omnipaque 350. Sagittal and coronal  reconstructions were generated.      FINDINGS:  LOWER CHEST:  Unremarkable      ABDOMEN:      LIVER:  Unremarkable      BILE DUCTS:  Unremarkable      GALLBLADDER:  There are no obvious gallstones.      PANCREAS:  Unremarkable      SPLEEN:  Unremarkable      ADRENAL GLANDS:  There are no adrenal masses.      KIDNEYS AND URETERS:  Kidneys are not obstructed. There are renal cortical cysts.      There are no renal calculi. The ureters are normal in caliber.      PELVIS:      BLADDER:  Unremarkable      REPRODUCTIVE ORGANS:  Patient is status post hysterectomy.      BOWEL:  There is no significant bowel distention.      There are significant sigmoid diverticula. There is thickening of the  wall of sigmoid colon. There is stranding of the adjacent fat. There  is a question of a small intramural abscess measuring 16 mm.      There is a normal caliber air-containing appendix.      VESSELS:  There are atherosclerotic changes of the aorta. The cava  is  unremarkable      PERITONEUM/RETROPERITONEUM/LYMPH NODES:  There is no evidence of free air.      There is no significant free fluid.      There is no significant lymphadenopathy.      BONES AND ABDOMINAL WALL:  There are degenerative changes of the spine and hips. Patient is  scoliotic.      COMPARISON OF FINDINGS:  The diverticulitis is new.      Impression: Diverticulosis with sigmoid diverticulitis. Suggestion of a small  intramural abscess.      Stranding of the adjacent fat.      MACRO:  none      Signed by: Tamika Durham 5/3/2024 11:41 AM  Dictation workstation:   [id number]       Assessment/Plan   Principal Problem:    Diverticulitis    Diverticulitis with abscess  -Presented to the ER with abdominal pain that started 6 days ago.  Unfortunately she had to leave AGAINST MEDICAL ADVICE to take care of personal things.  She presented back to the ER to be admitted for IV antibiotics.  She does have many allergies to antibiotics therefore infectious disease will be consulted. She is currently afebrile and hemodynamically stable.  -Clear liquid diet  -IVF LR at 100 mL an hour  -Consult infectious disease  -IV clindamycin and Azactam  -Monitor for worsening signs of infections including fever and increased abdominal pain    DVTp: Lovenox daily    PLAN: home    JOVANNY Davidson-CNP    Plan of care was discussed extensively with patient.  Patient verbalized understanding through teach back method.  All question and concerns addressed upon examination.    Of note, this documentation is completed using the Dragon Dictation system (voice recognition software). There may be spelling and/or grammatical errors that were not corrected prior to final submission.

## 2024-05-04 ENCOUNTER — OUTSIDE SERVICES (OUTPATIENT)
Dept: INFECTIOUS DISEASES | Age: 64
End: 2024-05-04
Payer: COMMERCIAL

## 2024-05-04 DIAGNOSIS — Z88.9: ICD-10-CM

## 2024-05-04 DIAGNOSIS — K57.20 ABSCESS OF SIGMOID COLON DUE TO DIVERTICULITIS: Primary | ICD-10-CM

## 2024-05-04 PROBLEM — K57.80 DIVERTICULITIS OF INTESTINE WITH ABSCESS WITHOUT BLEEDING, UNSPECIFIED PART OF INTESTINAL TRACT: Status: ACTIVE | Noted: 2024-05-04

## 2024-05-04 LAB
ANION GAP SERPL CALC-SCNC: 11 MMOL/L (ref 10–20)
BUN SERPL-MCNC: 7 MG/DL (ref 6–23)
CALCIUM SERPL-MCNC: 8.6 MG/DL (ref 8.6–10.3)
CHLORIDE SERPL-SCNC: 107 MMOL/L (ref 98–107)
CO2 SERPL-SCNC: 23 MMOL/L (ref 21–32)
CREAT SERPL-MCNC: 0.41 MG/DL (ref 0.5–1.05)
EGFRCR SERPLBLD CKD-EPI 2021: >90 ML/MIN/1.73M*2
ERYTHROCYTE [DISTWIDTH] IN BLOOD BY AUTOMATED COUNT: 12.7 % (ref 11.5–14.5)
GLUCOSE SERPL-MCNC: 131 MG/DL (ref 74–99)
HCT VFR BLD AUTO: 34.1 % (ref 36–46)
HGB BLD-MCNC: 11.3 G/DL (ref 12–16)
MCH RBC QN AUTO: 30.4 PG (ref 26–34)
MCHC RBC AUTO-ENTMCNC: 33.1 G/DL (ref 32–36)
MCV RBC AUTO: 92 FL (ref 80–100)
NRBC BLD-RTO: 0 /100 WBCS (ref 0–0)
PLATELET # BLD AUTO: 227 X10*3/UL (ref 150–450)
POTASSIUM SERPL-SCNC: 3.7 MMOL/L (ref 3.5–5.3)
RBC # BLD AUTO: 3.72 X10*6/UL (ref 4–5.2)
SODIUM SERPL-SCNC: 137 MMOL/L (ref 136–145)
WBC # BLD AUTO: 10.1 X10*3/UL (ref 4.4–11.3)

## 2024-05-04 PROCEDURE — 99231 SBSQ HOSP IP/OBS SF/LOW 25: CPT | Performed by: SURGERY

## 2024-05-04 PROCEDURE — 1210000001 HC SEMI-PRIVATE ROOM DAILY

## 2024-05-04 PROCEDURE — 2500000001 HC RX 250 WO HCPCS SELF ADMINISTERED DRUGS (ALT 637 FOR MEDICARE OP)

## 2024-05-04 PROCEDURE — 99222 1ST HOSP IP/OBS MODERATE 55: CPT | Performed by: INTERNAL MEDICINE

## 2024-05-04 PROCEDURE — 2500000006 HC RX 250 W HCPCS SELF ADMINISTERED DRUGS (ALT 637 FOR ALL PAYERS)

## 2024-05-04 PROCEDURE — 80048 BASIC METABOLIC PNL TOTAL CA: CPT

## 2024-05-04 PROCEDURE — 2500000004 HC RX 250 GENERAL PHARMACY W/ HCPCS (ALT 636 FOR OP/ED): Performed by: INTERNAL MEDICINE

## 2024-05-04 PROCEDURE — 2500000004 HC RX 250 GENERAL PHARMACY W/ HCPCS (ALT 636 FOR OP/ED)

## 2024-05-04 PROCEDURE — 36415 COLL VENOUS BLD VENIPUNCTURE: CPT

## 2024-05-04 PROCEDURE — 85027 COMPLETE CBC AUTOMATED: CPT

## 2024-05-04 RX ORDER — CHOLECALCIFEROL (VITAMIN D3) 25 MCG
2000 TABLET ORAL
Status: DISCONTINUED | OUTPATIENT
Start: 2024-05-04 | End: 2024-05-07 | Stop reason: HOSPADM

## 2024-05-04 RX ORDER — DIPHENHYDRAMINE HYDROCHLORIDE 50 MG/ML
25 INJECTION INTRAMUSCULAR; INTRAVENOUS EVERY 6 HOURS PRN
Status: DISCONTINUED | OUTPATIENT
Start: 2024-05-04 | End: 2024-05-07 | Stop reason: HOSPADM

## 2024-05-04 RX ORDER — SIMVASTATIN 40 MG/1
40 TABLET, FILM COATED ORAL DAILY
Status: DISCONTINUED | OUTPATIENT
Start: 2024-05-04 | End: 2024-05-07 | Stop reason: HOSPADM

## 2024-05-04 RX ORDER — MEROPENEM 1 G/1
1 INJECTION, POWDER, FOR SOLUTION INTRAVENOUS EVERY 8 HOURS
Qty: 4200 ML | Refills: 0 | Status: DISCONTINUED | OUTPATIENT
Start: 2024-05-05 | End: 2024-05-04

## 2024-05-04 RX ORDER — MEROPENEM 1 G/1
1 INJECTION, POWDER, FOR SOLUTION INTRAVENOUS EVERY 8 HOURS
Status: DISCONTINUED | OUTPATIENT
Start: 2024-05-05 | End: 2024-05-06

## 2024-05-04 RX ADMIN — CLINDAMYCIN PHOSPHATE 900 MG: 900 INJECTION, SOLUTION INTRAVENOUS at 05:23

## 2024-05-04 RX ADMIN — AZTREONAM 1 G: 1 INJECTION, POWDER, LYOPHILIZED, FOR SOLUTION INTRAMUSCULAR; INTRAVENOUS at 07:57

## 2024-05-04 RX ADMIN — HYDROMORPHONE HYDROCHLORIDE 0.5 MG: 1 INJECTION, SOLUTION INTRAMUSCULAR; INTRAVENOUS; SUBCUTANEOUS at 05:40

## 2024-05-04 RX ADMIN — MEROPENEM 900 MG: 1 INJECTION, POWDER, FOR SOLUTION INTRAVENOUS at 18:05

## 2024-05-04 RX ADMIN — SODIUM CHLORIDE, SODIUM LACTATE, POTASSIUM CHLORIDE, AND CALCIUM CHLORIDE 100 ML/HR: 600; 310; 30; 20 INJECTION, SOLUTION INTRAVENOUS at 09:00

## 2024-05-04 RX ADMIN — SIMVASTATIN 40 MG: 40 TABLET, FILM COATED ORAL at 11:16

## 2024-05-04 RX ADMIN — ONDANSETRON 4 MG: 2 INJECTION INTRAMUSCULAR; INTRAVENOUS at 13:48

## 2024-05-04 RX ADMIN — ONDANSETRON 4 MG: 2 INJECTION INTRAMUSCULAR; INTRAVENOUS at 05:41

## 2024-05-04 RX ADMIN — SODIUM CHLORIDE, SODIUM LACTATE, POTASSIUM CHLORIDE, AND CALCIUM CHLORIDE 100 ML/HR: 600; 310; 30; 20 INJECTION, SOLUTION INTRAVENOUS at 22:51

## 2024-05-04 RX ADMIN — HYDROMORPHONE HYDROCHLORIDE 0.5 MG: 1 INJECTION, SOLUTION INTRAMUSCULAR; INTRAVENOUS; SUBCUTANEOUS at 13:46

## 2024-05-04 RX ADMIN — MEROPENEM 100 MG: 1 INJECTION, POWDER, FOR SOLUTION INTRAVENOUS at 15:58

## 2024-05-04 RX ADMIN — Medication 2000 UNITS: at 11:15

## 2024-05-04 RX ADMIN — CLINDAMYCIN PHOSPHATE 900 MG: 900 INJECTION, SOLUTION INTRAVENOUS at 13:32

## 2024-05-04 ASSESSMENT — PAIN - FUNCTIONAL ASSESSMENT
PAIN_FUNCTIONAL_ASSESSMENT: 0-10

## 2024-05-04 ASSESSMENT — COGNITIVE AND FUNCTIONAL STATUS - GENERAL
MOBILITY SCORE: 24
DAILY ACTIVITIY SCORE: 24
MOBILITY SCORE: 24
DAILY ACTIVITIY SCORE: 24

## 2024-05-04 ASSESSMENT — PAIN DESCRIPTION - DESCRIPTORS: DESCRIPTORS: CRAMPING

## 2024-05-04 ASSESSMENT — PAIN SCALES - GENERAL
PAINLEVEL_OUTOF10: 0 - NO PAIN
PAINLEVEL_OUTOF10: 7
PAINLEVEL_OUTOF10: 5 - MODERATE PAIN
PAINLEVEL_OUTOF10: 8

## 2024-05-04 ASSESSMENT — PAIN DESCRIPTION - LOCATION: LOCATION: ABDOMEN

## 2024-05-04 NOTE — CONSULTS
Consults  Referred by Dr Anton MD    Primary MD: Panfilo Christina DO    Reason For Consult      History Of Present Illness  Sarah Walker is a 64 y.o. female with past medical history of recurrent diverticulitis and constipation was admitted with 1 week history of cramping blood with abdominal pain, constipation, decreased appetite and fatigue.  Patient was found to have acute diverticulitis of sigmoid colon with 1.6 mm intramural abscess.  Patient has multiple drug allergies including penicillin and ciprofloxacin and Flagyl with hives.  Patient was admitted and was started on IV clindamycin in the Azactam but she has been tolerating well.  Patient denies any fevers or chills.  She reported that she vomited last night after she took pain medications.  She reported that she had a small bowel movement last night.      Past Medical History  Diverticulitis cystocele hyperlipidemia osteoarthritis of lumbosacral spine rotator cuff tear uterine prolapse  Surgical History  She has a past surgical history that includes Dilation and curettage of uterus (11/06/2014); Back surgery (08/31/2017); and Hysterectomy (10/30/2017).     Social History     Occupational History     Employer: NONE   Tobacco Use    Smoking status: Former     Types: Cigarettes    Smokeless tobacco: Never   Vaping Use    Vaping status: Never Used   Substance and Sexual Activity    Alcohol use: Yes    Drug use: Never    Sexual activity: Not on file     Travel History   Travel since 04/04/24    No documented travel since 04/04/24            Family History  Family History   Problem Relation Name Age of Onset    Liver cancer Father      Depression Sister      Arthritis Sister      Asthma Brother      Depression Daughter       Allergies  Ciprofloxacin, Metronidazole, Penicillins, Sulfamethoxazole-trimethoprim, and Atorvastatin     Immunization History   Administered Date(s) Administered    Flu vaccine (IIV4), preservative free *Check age/dose* 09/21/2017,  08/17/2018, 09/11/2019, 09/23/2020, 10/06/2020, 09/23/2021, 09/21/2022    Hepatitis A vaccine, age 19 years and greater (HAVRIX) 09/23/2020, 01/27/2021    Influenza, Unspecified 10/06/2020, 09/23/2021, 09/21/2022    Influenza, seasonal, injectable 01/23/2015    Influenza, seasonal, injectable, preservative free 11/11/2016    MMR vaccine, subcutaneous (MMR II) 07/11/2019    Pfizer Purple Cap SARS-CoV-2 05/20/2021, 06/10/2021    Pneumococcal conjugate vaccine, 13-valent (PREVNAR 13) 09/11/2019    Pneumococcal polysaccharide vaccine, 23-valent, age 2 years and older (PNEUMOVAX 23) 09/13/2018    Pneumococcal, Unspecified 08/10/2018    Tdap vaccine, age 7 year and older (BOOSTRIX, ADACEL) 07/11/2019    Zoster vaccine, recombinant, adult (SHINGRIX) 06/14/2018, 08/17/2018     Medications  Home medications:  Facility-Administered Medications Prior to Admission   Medication Dose Route Frequency Provider Last Rate Last Admin    triamcinolone acetonide (Kenalog-40) injection 40 mg  40 mg intramuscular Once Panfilo Christina, DO         Medications Prior to Admission   Medication Sig Dispense Refill Last Dose    montelukast (Singulair) 10 mg tablet Take 1 tablet (10 mg) by mouth once daily at bedtime.   5/2/2024    simvastatin (Zocor) 40 mg tablet Take 1 tablet (40 mg) by mouth once daily at bedtime. 90 tablet 3 5/3/2024    albuterol 90 mcg/actuation inhaler Inhale 1 puff 3 times a day.       chlorhexidine (Peridex) 0.12 % solution Use 15 mL in the mouth or throat if needed.       cholecalciferol (Vitamin D-3) 50 MCG (2000 UT) tablet Take 1 tablet (2,000 Units) by mouth once daily.       clobetasol (Temovate) 0.05 % ointment apply to affected area twice a day if needed 80 g 0     cyclobenzaprine (Flexeril) 10 mg tablet Take 1 tablet (10 mg) by mouth as needed at bedtime.       doxepin (SINEquan) 25 mg capsule Take 1 capsule (25 mg) by mouth once daily at bedtime.       EPINEPHrine 0.3 mg/0.3 mL injection syringe Inject 0.3 mL  "(0.3 mg) into the muscle 1 time if needed for anaphylaxis for up to 1 dose. 2 each 2     famotidine (Pepcid) 20 mg tablet Take 1 tablet (20 mg) by mouth twice a day.       fexofenadine (Allegra) 180 mg tablet Take one tablet in the AM 30 tablet 3     fluticasone (Flonase) 50 mcg/actuation nasal spray Administer 2 sprays into each nostril once daily. 16 g 1     hydrocortisone 2.5 % cream apply to affected area twice a day if needed for 2 weeks ONE WEEK OFF       hydrOXYzine HCL (Atarax) 25 mg tablet Take 1 tablet (25 mg) by mouth every 8 hours if needed for itching. 30 tablet 1 4/24/2024    hydrOXYzine pamoate (Vistaril) 25 mg capsule Take 1 capsule (25 mg) by mouth once daily.       latanoprost (Xalatan) 0.005 % ophthalmic solution Administer 1 drop into affected eye(s) once daily.   5/1/2024    levocetirizine (Xyzal) 5 mg tablet Take 1 tablet at bedtime 30 tablet 3     loratadine (Claritin) 10 mg tablet Take 1 tablet (10 mg) by mouth twice a day.       Mucinex 600 mg 12 hr tablet Take 1 tablet (600 mg) by mouth 2 times a day.       timolol (Timoptic) 0.5 % ophthalmic solution Administer 1 drop into both eyes 2 times a day.   5/1/2024 at am, pm     Current medications:  Scheduled medications  aztreonam, 1 g, intravenous, q8h  cholecalciferol, 2,000 Units, oral, Daily  clindamycin, 900 mg, intravenous, q8h  enoxaparin, 40 mg, subcutaneous, q24h  simvastatin, 40 mg, oral, Daily      Continuous medications  lactated Ringer's, 100 mL/hr, Last Rate: 100 mL/hr (05/04/24 0900)      PRN medications  PRN medications: acetaminophen **OR** acetaminophen, HYDROmorphone, ondansetron ODT **OR** ondansetron, oxyCODONE-acetaminophen    Review of Systems  14 system review is negative otherwise  Objective  Range of Vitals (last 24 hours)  Heart Rate:  [61-84]   Temp:  [36.2 °C (97.2 °F)-36.6 °C (97.9 °F)]   Resp:  [18]   BP: ()/()   Height:  [160 cm (5' 3\")]   Weight:  [68 kg (150 lb)]   SpO2:  [95 %-97 %]   Daily " Weight  05/03/24 : 68 kg (150 lb)    Body mass index is 26.57 kg/m².     Physical Exam     Vitals:    05/03/24 1957 05/03/24 2023 05/04/24 0500 05/04/24 0840   BP: 120/71 118/75 93/51 106/50   BP Location:   Left arm    Patient Position:   Lying    Pulse:  83 67 61   Resp:       Temp:  36.3 °C (97.3 °F) 36.6 °C (97.9 °F) 36.2 °C (97.2 °F)   TempSrc:   Temporal Temporal   SpO2:  96% 97%    Weight:       Height:         General Appearance: alert and oriented to person, place and time, well-developed and well-nourished, in no acute distress, on room air  Skin: warm and dry, no rash.   Head: normocephalic and atraumatic  Eyes: anicteric sclerae  ENT:  normal mucous membranes. No oral thrush  Lungs: normal respiratory effort, clear lungs  Heart normal S1-S2 no murmur  Abdomen: soft, positive direct left lower quadrant tenderness, positive bowel sounds  No leg edema  No erythema, no tenderness      Labs  Results from last 72 hours   Lab Units 05/04/24  0654 05/03/24  1027   WBC AUTO x10*3/uL 10.1 12.2*   HEMOGLOBIN g/dL 11.3* 13.6   HEMATOCRIT % 34.1* 39.4   PLATELETS AUTO x10*3/uL 227 277   NEUTROS PCT AUTO %  --  76.9   LYMPHS PCT AUTO %  --  12.8   MONOS PCT AUTO %  --  9.4   EOS PCT AUTO %  --  0.2     Results from last 72 hours   Lab Units 05/04/24  0654 05/03/24  1027   SODIUM mmol/L 137 138   POTASSIUM mmol/L 3.7 3.5   CHLORIDE mmol/L 107 105   CO2 mmol/L 23 25   BUN mg/dL 7 9   CREATININE mg/dL 0.41* 0.44*   GLUCOSE mg/dL 131* 148*   CALCIUM mg/dL 8.6 9.5   ANION GAP mmol/L 11 12   EGFR mL/min/1.73m*2 >90 >90     Results from last 72 hours   Lab Units 05/03/24  1027   ALK PHOS U/L 93   BILIRUBIN TOTAL mg/dL 0.5   PROTEIN TOTAL g/dL 7.6   ALT U/L 20   AST U/L 16   ALBUMIN g/dL 4.4     Estimated Creatinine Clearance: 125 mL/min (A) (by C-G formula based on SCr of 0.41 mg/dL (L)).  CRP   Date Value Ref Range Status   05/10/2022 <0.10 mg/dL Final     Comment:     REF VALUE  < 1.00       Sedimentation Rate   Date Value  "Ref Range Status   01/25/2024 15 0 - 30 mm/h Final   05/10/2022 15 0 - 30 mm/h Final     Comment:     Please note new reference ranges as of 5/9/2022.     No results found for: \"HIV1X2\", \"HIVCONF\", \"[id number]\"  Hepatitis C Ab   Date Value Ref Range Status   08/10/2018 NON-REACTIVE NONREACTIVE Final     Comment:      Patients receiving more than 5 mg/day of biotin may have interference   in test results.  A sample should be taken no sooner than eight hours   after  previous dose. Contact [phone] for additional information.        Imaging  CT abdomen pelvis w IV contrast    Result Date: 5/3/2024  Interpreted By:  Tamika Durham, STUDY: CT ABDOMEN PELVIS W IV CONTRAST;  5/3/2024 11:20 am   INDICATION: Signs/Symptoms:very tender bilateral lower quadrants, constipation, hx diverticular disease.   COMPARISON: 04/04/2022   ACCESSION NUMBER(S): [id number]   ORDERING CLINICIAN: ALESSIO BERG   TECHNIQUE: CT of the abdomen and pelvis was performed following the intravenous administration of 75 mL Omnipaque 350. Sagittal and coronal reconstructions were generated.   FINDINGS: LOWER CHEST: Unremarkable   ABDOMEN:   LIVER: Unremarkable   BILE DUCTS: Unremarkable   GALLBLADDER: There are no obvious gallstones.   PANCREAS: Unremarkable   SPLEEN: Unremarkable   ADRENAL GLANDS: There are no adrenal masses.   KIDNEYS AND URETERS: Kidneys are not obstructed. There are renal cortical cysts.   There are no renal calculi. The ureters are normal in caliber.   PELVIS:   BLADDER: Unremarkable   REPRODUCTIVE ORGANS: Patient is status post hysterectomy.   BOWEL: There is no significant bowel distention.   There are significant sigmoid diverticula. There is thickening of the wall of sigmoid colon. There is stranding of the adjacent fat. There is a question of a small intramural abscess measuring 16 mm.   There is a normal caliber air-containing appendix.   VESSELS: There are atherosclerotic changes of the aorta. The cava is unremarkable   " PERITONEUM/RETROPERITONEUM/LYMPH NODES: There is no evidence of free air.   There is no significant free fluid.   There is no significant lymphadenopathy.   BONES AND ABDOMINAL WALL: There are degenerative changes of the spine and hips. Patient is scoliotic.   COMPARISON OF FINDINGS: The diverticulitis is new.       Diverticulosis with sigmoid diverticulitis. Suggestion of a small intramural abscess.   Stranding of the adjacent fat.   MACRO: none   Signed by: Tamika Durham 5/3/2024 11:41 AM Dictation workstation:   [id number]      Assessment/Plan       IMPRESSION:    Acute diverticulitis of sigmoid colon with small intramural abscess  Multiple drug allergies    PLAN:  DC Azactam and clindamycin  Start IV meropenem 1 g every 8 hrs after giving a test dose of 100 mg as discussed with the nurse and patient   Benadryl as needed  If patient tolerates meropenem and if she continues to improve, she may be discharged on Monday after midline placement with home IV Invanz    Discussed with patient and RN    Bella Bui MD

## 2024-05-04 NOTE — PROGRESS NOTES
General Surgery Progress Note    Patient: Sarah Walker  Unit/Bed: 1118/1118-A  YOB: 1960  MRN: [id number]  Acct: [id number]   Admitting Diagnosis: Diverticulitis [K57.92]  Diverticulitis of intestine with abscess without bleeding, unspecified part of intestinal tract [K57.80]  Date:  5/3/2024  Hospital Day: 0  Attending: Mitchell Walton MD    Complaint:  Chief Complaint   Patient presents with    Abdominal Pain      Subjective  Patient seen and examined this morning. No acute events overnight.  She states she is still having severe abdominal pain, worse when using the bathroom.  She is also had nausea and vomiting and is not tolerating chicken broth, but is okay with drinking sips of water.  She states she intermittently has left posterior calf pain chronically.  She denies any allergy symptoms since admission such as hives, shortness of breath, or difficulty swallowing.  No other complaints.  PHYSICAL EXAM:  Physical Exam  Constitutional:       General: She is not in acute distress.  HENT:      Head: Normocephalic and atraumatic.      Mouth/Throat:      Mouth: Mucous membranes are moist.   Eyes:      Conjunctiva/sclera: Conjunctivae normal.   Cardiovascular:      Rate and Rhythm: Normal rate and regular rhythm.   Pulmonary:      Effort: Pulmonary effort is normal. No respiratory distress.   Abdominal:      General: Abdomen is flat. There is no distension.      Palpations: Abdomen is soft.      Tenderness: There is abdominal tenderness (right-sided).   Musculoskeletal:         General: No swelling.      Right lower leg: No edema.      Left lower leg: No edema.   Skin:     General: Skin is warm and dry.   Neurological:      Mental Status: She is alert.   Psychiatric:         Mood and Affect: Mood normal.         Behavior: Behavior normal.       Vital signs in last 24 hours:  Vitals:    05/04/24 0840   BP: 106/50   Pulse: 61   Resp:    Temp: 36.2 °C (97.2 °F)   SpO2:      Intake/Output this  shift:  No intake or output data in the 24 hours ending 05/04/24 1016   Allergies:  Allergies   Allergen Reactions    Ciprofloxacin Hives and Other    Metronidazole Hives and Unknown    Penicillins Hives and Other    Sulfamethoxazole-Trimethoprim Hives and Unknown    Atorvastatin Rash and Other      Medications:  Scheduled medications  aztreonam, 1 g, intravenous, q8h  cholecalciferol, 2,000 Units, oral, Daily  clindamycin, 900 mg, intravenous, q8h  enoxaparin, 40 mg, subcutaneous, q24h  simvastatin, 40 mg, oral, Daily      Continuous medications  lactated Ringer's, 100 mL/hr, Last Rate: 100 mL/hr (05/04/24 0900)      PRN medications  PRN medications: acetaminophen **OR** acetaminophen, HYDROmorphone, ondansetron ODT **OR** ondansetron, oxyCODONE-acetaminophen  Labs:  Results for orders placed or performed during the hospital encounter of 05/03/24 (from the past 24 hour(s))   CBC   Result Value Ref Range    WBC 10.1 4.4 - 11.3 x10*3/uL    nRBC 0.0 0.0 - 0.0 /100 WBCs    RBC 3.72 (L) 4.00 - 5.20 x10*6/uL    Hemoglobin 11.3 (L) 12.0 - 16.0 g/dL    Hematocrit 34.1 (L) 36.0 - 46.0 %    MCV 92 80 - 100 fL    MCH 30.4 26.0 - 34.0 pg    MCHC 33.1 32.0 - 36.0 g/dL    RDW 12.7 11.5 - 14.5 %    Platelets 227 150 - 450 x10*3/uL   Basic metabolic panel   Result Value Ref Range    Glucose 131 (H) 74 - 99 mg/dL    Sodium 137 136 - 145 mmol/L    Potassium 3.7 3.5 - 5.3 mmol/L    Chloride 107 98 - 107 mmol/L    Bicarbonate 23 21 - 32 mmol/L    Anion Gap 11 10 - 20 mmol/L    Urea Nitrogen 7 6 - 23 mg/dL    Creatinine 0.41 (L) 0.50 - 1.05 mg/dL    eGFR >90 >60 mL/min/1.73m*2    Calcium 8.6 8.6 - 10.3 mg/dL      Imaging:  CT abdomen pelvis w IV contrast    Result Date: 5/3/2024  Interpreted By:  Tamika Durham, STUDY: CT ABDOMEN PELVIS W IV CONTRAST;  5/3/2024 11:20 am   INDICATION: Signs/Symptoms:very tender bilateral lower quadrants, constipation, hx diverticular disease.   COMPARISON: 04/04/2022   ACCESSION NUMBER(S): [id number]    ORDERING CLINICIAN: ALESSIO BERG   TECHNIQUE: CT of the abdomen and pelvis was performed following the intravenous administration of 75 mL Omnipaque 350. Sagittal and coronal reconstructions were generated.   FINDINGS: LOWER CHEST: Unremarkable   ABDOMEN:   LIVER: Unremarkable   BILE DUCTS: Unremarkable   GALLBLADDER: There are no obvious gallstones.   PANCREAS: Unremarkable   SPLEEN: Unremarkable   ADRENAL GLANDS: There are no adrenal masses.   KIDNEYS AND URETERS: Kidneys are not obstructed. There are renal cortical cysts.   There are no renal calculi. The ureters are normal in caliber.   PELVIS:   BLADDER: Unremarkable   REPRODUCTIVE ORGANS: Patient is status post hysterectomy.   BOWEL: There is no significant bowel distention.   There are significant sigmoid diverticula. There is thickening of the wall of sigmoid colon. There is stranding of the adjacent fat. There is a question of a small intramural abscess measuring 16 mm.   There is a normal caliber air-containing appendix.   VESSELS: There are atherosclerotic changes of the aorta. The cava is unremarkable   PERITONEUM/RETROPERITONEUM/LYMPH NODES: There is no evidence of free air.   There is no significant free fluid.   There is no significant lymphadenopathy.   BONES AND ABDOMINAL WALL: There are degenerative changes of the spine and hips. Patient is scoliotic.   COMPARISON OF FINDINGS: The diverticulitis is new.       Diverticulosis with sigmoid diverticulitis. Suggestion of a small intramural abscess.   Stranding of the adjacent fat.   MACRO: none   Signed by: Tamika Durham 5/3/2024 11:41 AM Dictation workstation:   [id number]    Assessment  Diverticulitis with abscess    No acute events overnight.  The patient is still having severe abdominal pain, as well as nausea and vomiting.  She states she is not tolerating chicken broth but tolerates small sips of water and requests crackers.  On exam, abdomen is soft, nondistended, right-sided tenderness to  palpation, no leg swelling.  BP 93/51 this morning.  She is afebrile and WBC decreased from 12.2 yesterday to 10.1 today.    Plan  -Infectious Disease consulted  -IV clindamycin and Azactam  -NPO except sips of water, sips with meds, and crackers  -Pain control  -Nausea control  -IVF LR at 100 mL an hour  -Monitor for worsening signs of infections including fever and increased abdominal pain   -Monitor BP  -Continue home medications: simvastatin 40mg and vitamin D 2000 mg daily  -DVT prophylaxis-Lovenox daily  -Encourage ambulation      Further recommendations per Dr. Anton Hagen PA-C    Time spent  40  minutes obtaining labs, imaging, recommendations, interview, assessment, examination, medication review/ordering, and EMR review.    Plan of care was discussed extensively with patient. Patient verbalized understanding through teach back method. All questions and concerns addressed upon examination.     Of note, this documentation is completed using the Dragon Dictation system (voice recognition software). There may be spelling and/or grammatical errors that were not corrected prior to final submission.

## 2024-05-04 NOTE — CARE PLAN
The patient's goals for the shift include      The clinical goals for the shift include IV fluid, antibiotics      Problem: Pain  Goal: My pain/discomfort is manageable  Outcome: Progressing     Problem: Safety  Goal: Patient will be injury free during hospitalization  Outcome: Progressing  Goal: I will remain free of falls  Outcome: Progressing     Problem: Daily Care  Goal: Daily care needs are met  Outcome: Progressing     Problem: Psychosocial Needs  Goal: Demonstrates ability to cope with hospitalization/illness  Outcome: Progressing  Goal: Collaborate with me, my family, and caregiver to identify my specific goals  Outcome: Progressing  Flowsheets (Taken 5/3/2024 2153)  Cultural Requests During Hospitalization: none  Spiritual Requests During Hospitalization: none     Problem: Discharge Barriers  Goal: My discharge needs are met  Outcome: Progressing     Problem: Skin  Goal: Participates in plan/prevention/treatment measures  Outcome: Progressing  Goal: Prevent/manage excess moisture  Outcome: Progressing  Goal: Prevent/minimize sheer/friction injuries  Outcome: Progressing  Goal: Promote/optimize nutrition  Outcome: Progressing  Goal: Promote skin healing  Outcome: Progressing

## 2024-05-05 ENCOUNTER — OUTSIDE SERVICES (OUTPATIENT)
Dept: INFECTIOUS DISEASES | Age: 64
End: 2024-05-05
Payer: COMMERCIAL

## 2024-05-05 DIAGNOSIS — K57.20 DIVERTICULITIS OF LARGE INTESTINE WITH ABSCESS WITHOUT BLEEDING: Primary | ICD-10-CM

## 2024-05-05 DIAGNOSIS — Z88.9 MULTIPLE DRUG ALLERGIES: ICD-10-CM

## 2024-05-05 LAB
ANION GAP SERPL CALC-SCNC: 13 MMOL/L (ref 10–20)
BUN SERPL-MCNC: 6 MG/DL (ref 6–23)
CALCIUM SERPL-MCNC: 8.7 MG/DL (ref 8.6–10.3)
CHLORIDE SERPL-SCNC: 109 MMOL/L (ref 98–107)
CO2 SERPL-SCNC: 23 MMOL/L (ref 21–32)
CREAT SERPL-MCNC: 0.41 MG/DL (ref 0.5–1.05)
EGFRCR SERPLBLD CKD-EPI 2021: >90 ML/MIN/1.73M*2
ERYTHROCYTE [DISTWIDTH] IN BLOOD BY AUTOMATED COUNT: 12.5 % (ref 11.5–14.5)
GLUCOSE SERPL-MCNC: 106 MG/DL (ref 74–99)
HCT VFR BLD AUTO: 34.9 % (ref 36–46)
HGB BLD-MCNC: 11.7 G/DL (ref 12–16)
HOLD SPECIMEN: NORMAL
MAGNESIUM SERPL-MCNC: 2 MG/DL (ref 1.6–2.4)
MCH RBC QN AUTO: 30.5 PG (ref 26–34)
MCHC RBC AUTO-ENTMCNC: 33.5 G/DL (ref 32–36)
MCV RBC AUTO: 91 FL (ref 80–100)
NRBC BLD-RTO: 0 /100 WBCS (ref 0–0)
PLATELET # BLD AUTO: 217 X10*3/UL (ref 150–450)
POTASSIUM SERPL-SCNC: 3.7 MMOL/L (ref 3.5–5.3)
RBC # BLD AUTO: 3.83 X10*6/UL (ref 4–5.2)
SODIUM SERPL-SCNC: 141 MMOL/L (ref 136–145)
WBC # BLD AUTO: 5.9 X10*3/UL (ref 4.4–11.3)

## 2024-05-05 PROCEDURE — 83735 ASSAY OF MAGNESIUM: CPT

## 2024-05-05 PROCEDURE — 80048 BASIC METABOLIC PNL TOTAL CA: CPT

## 2024-05-05 PROCEDURE — 2500000004 HC RX 250 GENERAL PHARMACY W/ HCPCS (ALT 636 FOR OP/ED)

## 2024-05-05 PROCEDURE — 2500000006 HC RX 250 W HCPCS SELF ADMINISTERED DRUGS (ALT 637 FOR ALL PAYERS)

## 2024-05-05 PROCEDURE — 2500000001 HC RX 250 WO HCPCS SELF ADMINISTERED DRUGS (ALT 637 FOR MEDICARE OP)

## 2024-05-05 PROCEDURE — 99232 SBSQ HOSP IP/OBS MODERATE 35: CPT | Performed by: INTERNAL MEDICINE

## 2024-05-05 PROCEDURE — 36415 COLL VENOUS BLD VENIPUNCTURE: CPT

## 2024-05-05 PROCEDURE — 85027 COMPLETE CBC AUTOMATED: CPT

## 2024-05-05 PROCEDURE — 1210000001 HC SEMI-PRIVATE ROOM DAILY

## 2024-05-05 PROCEDURE — 99231 SBSQ HOSP IP/OBS SF/LOW 25: CPT | Performed by: SURGERY

## 2024-05-05 PROCEDURE — 2500000004 HC RX 250 GENERAL PHARMACY W/ HCPCS (ALT 636 FOR OP/ED): Performed by: PHARMACIST

## 2024-05-05 RX ORDER — ENOXAPARIN SODIUM 100 MG/ML
40 INJECTION SUBCUTANEOUS EVERY 24 HOURS
Status: DISCONTINUED | OUTPATIENT
Start: 2024-05-05 | End: 2024-05-07 | Stop reason: HOSPADM

## 2024-05-05 RX ORDER — HYDROCODONE BITARTRATE AND ACETAMINOPHEN 5; 325 MG/1; MG/1
1 TABLET ORAL EVERY 6 HOURS PRN
Status: DISCONTINUED | OUTPATIENT
Start: 2024-05-05 | End: 2024-05-07 | Stop reason: HOSPADM

## 2024-05-05 RX ORDER — HYDROCODONE BITARTRATE AND ACETAMINOPHEN 10; 325 MG/1; MG/1
1 TABLET ORAL EVERY 6 HOURS PRN
Status: DISCONTINUED | OUTPATIENT
Start: 2024-05-05 | End: 2024-05-07 | Stop reason: HOSPADM

## 2024-05-05 RX ORDER — LIDOCAINE HYDROCHLORIDE 10 MG/ML
5 INJECTION INFILTRATION; PERINEURAL ONCE
Status: DISCONTINUED | OUTPATIENT
Start: 2024-05-05 | End: 2024-05-07 | Stop reason: HOSPADM

## 2024-05-05 RX ADMIN — MEROPENEM 1 G: 1 INJECTION, POWDER, FOR SOLUTION INTRAVENOUS at 19:46

## 2024-05-05 RX ADMIN — HYDROCODONE BITARTRATE AND ACETAMINOPHEN 1 TABLET: 5; 325 TABLET ORAL at 11:38

## 2024-05-05 RX ADMIN — ENOXAPARIN SODIUM 40 MG: 40 INJECTION SUBCUTANEOUS at 10:49

## 2024-05-05 RX ADMIN — SIMVASTATIN 40 MG: 40 TABLET, FILM COATED ORAL at 09:59

## 2024-05-05 RX ADMIN — MEROPENEM 1 G: 1 INJECTION, POWDER, FOR SOLUTION INTRAVENOUS at 01:06

## 2024-05-05 RX ADMIN — Medication 2000 UNITS: at 06:14

## 2024-05-05 RX ADMIN — HYDROCODONE BITARTRATE AND ACETAMINOPHEN 1 TABLET: 5; 325 TABLET ORAL at 18:36

## 2024-05-05 RX ADMIN — SODIUM CHLORIDE, SODIUM LACTATE, POTASSIUM CHLORIDE, AND CALCIUM CHLORIDE 100 ML/HR: 600; 310; 30; 20 INJECTION, SOLUTION INTRAVENOUS at 21:01

## 2024-05-05 RX ADMIN — MEROPENEM 1 G: 1 INJECTION, POWDER, FOR SOLUTION INTRAVENOUS at 11:48

## 2024-05-05 ASSESSMENT — PAIN - FUNCTIONAL ASSESSMENT: PAIN_FUNCTIONAL_ASSESSMENT: 0-10

## 2024-05-05 ASSESSMENT — PAIN SCALES - GENERAL
PAINLEVEL_OUTOF10: 6
PAINLEVEL_OUTOF10: 0 - NO PAIN
PAINLEVEL_OUTOF10: 6
PAINLEVEL_OUTOF10: 3

## 2024-05-05 NOTE — PROGRESS NOTES
Infectious Diseases Inpatient Progress Note          HISTORY OF PRESENT ILLNESS:  Follow up perforated acute diverticulitis of sigmoid colon with small intramural abscess in a patient with multiple drug allergies on IV meropenem, well tolerated.  Has right forearm infiltrated IV site with redness and pain.   Patient follows up with an allergist with monthly shots for recurrent hives.   She reports persistent lower abdominal discomfort and pain.  Persistent constipation.   Remains on liquid diet.   No fevers  Current Medications:    cholecalciferol, 2,000 Units, oral, Daily  enoxaparin, 40 mg, subcutaneous, q24h  lidocaine, 5 mL, infiltration, Once  meropenem, 1 g, intravenous, q8h  simvastatin, 40 mg, oral, Daily        Allergies:  Ciprofloxacin, Metronidazole, Penicillins, Sulfamethoxazole-trimethoprim, and Atorvastatin      Review of Systems  14 system review is negative other than HPI    Physical Exam    Heart Rate:  [60-86]   Temp:  [36 °C (96.8 °F)-36.8 °C (98.2 °F)]   Resp:  [15-18]   BP: (114-141)/(64-84)   SpO2:  [96 %-98 %]    Vitals:    05/04/24 1610 05/04/24 2032 05/05/24 0435 05/05/24 1331   BP: 141/84 137/64 114/71 134/80   BP Location:  Left arm Right arm Left arm   Patient Position:  Lying Lying Sitting   Pulse: 64 65 86 60   Resp:  15 18 16   Temp: 36.7 °C (98.1 °F) 36 °C (96.8 °F) 36.3 °C (97.3 °F) 36.8 °C (98.2 °F)   TempSrc: Temporal Temporal Temporal Temporal   SpO2:  98% 96% 96%   Weight:       Height:         General Appearance: alert and oriented to person, place and time, well-developed and well-nourished, in no acute distress  Skin: warm and dry, no rash.   Head: normocephalic and atraumatic  Eyes: anicteric sclerae  ENT:  normal mucous membranes. No oral thrush  Lungs: normal respiratory effort, clear lungs  Heart normal S1-S2 no murmur  Abdomen: soft, positive left lower quadrant and suprapubic direct tenderness  No leg edema  No erythema, no tenderness  Right forearm with positive  "erythema and tenderness at an old IV site  DATA:    Lab Results   Component Value Date    WBC 5.9 05/05/2024    HGB 11.7 (L) 05/05/2024    HCT 34.9 (L) 05/05/2024    MCV 91 05/05/2024     05/05/2024     Lab Results   Component Value Date    CREATININE 0.41 (L) 05/05/2024    BUN 6 05/05/2024     05/05/2024    K 3.7 05/05/2024     (H) 05/05/2024    CO2 23 05/05/2024       Hepatic Function Panel:No results found for: \"ALKPHOS\", \"ALT\", \"AST\", \"PROT\", \"BILITOT\", \"BILIDIR\"    Microbiology:   No results found for the last 90 days.     No lab exists for component: \"BC\"  No lab exists for component: \"BLOODCULT2\"  No lab exists for component: \"LABURIN\"  No lab exists for component: \"WNDABS\"  No lab exists for component: \"CULTRESP\"      Imaging:   CT abdomen pelvis w IV contrast    Result Date: 5/3/2024  Interpreted By:  Tamika Durham, STUDY: CT ABDOMEN PELVIS W IV CONTRAST;  5/3/2024 11:20 am   INDICATION: Signs/Symptoms:very tender bilateral lower quadrants, constipation, hx diverticular disease.   COMPARISON: 04/04/2022   ACCESSION NUMBER(S): [id number]   ORDERING CLINICIAN: ALESSIO BERG   TECHNIQUE: CT of the abdomen and pelvis was performed following the intravenous administration of 75 mL Omnipaque 350. Sagittal and coronal reconstructions were generated.   FINDINGS: LOWER CHEST: Unremarkable   ABDOMEN:   LIVER: Unremarkable   BILE DUCTS: Unremarkable   GALLBLADDER: There are no obvious gallstones.   PANCREAS: Unremarkable   SPLEEN: Unremarkable   ADRENAL GLANDS: There are no adrenal masses.   KIDNEYS AND URETERS: Kidneys are not obstructed. There are renal cortical cysts.   There are no renal calculi. The ureters are normal in caliber.   PELVIS:   BLADDER: Unremarkable   REPRODUCTIVE ORGANS: Patient is status post hysterectomy.   BOWEL: There is no significant bowel distention.   There are significant sigmoid diverticula. There is thickening of the wall of sigmoid colon. There is stranding of the " adjacent fat. There is a question of a small intramural abscess measuring 16 mm.   There is a normal caliber air-containing appendix.   VESSELS: There are atherosclerotic changes of the aorta. The cava is unremarkable   PERITONEUM/RETROPERITONEUM/LYMPH NODES: There is no evidence of free air.   There is no significant free fluid.   There is no significant lymphadenopathy.   BONES AND ABDOMINAL WALL: There are degenerative changes of the spine and hips. Patient is scoliotic.   COMPARISON OF FINDINGS: The diverticulitis is new.       Diverticulosis with sigmoid diverticulitis. Suggestion of a small intramural abscess.   Stranding of the adjacent fat.   MACRO: none   Signed by: Tamika Durham 5/3/2024 11:41 AM Dictation workstation:   [id number]         IMPRESSION:    Acute diverticulitis of sigmoid colon with small intramural abscess, improving with resolved leukocytosis  Multiple drug allergies, tolerating IV meropenem despite history of penicillin allergy    Patient Active Problem List   Diagnosis    Allergic rhinitis    Annular tear of lumbar disc    Anxiety    Arthritis    Hand arthritis    Hip osteoarthritis    Osteoarthritis    Plantar fasciitis    Asthma (HHS-HCC)    Chronic pruritic rash in adult    Depression    Diverticulitis of colon    Diverticulosis    Dyslipidemia    Failed back syndrome, lumbar    Insomnia    Lichen planus    Low back pain    Lumbar radiculitis    Lumbar spondylosis    Preglaucoma, bilateral    S/P lumbar discectomy    Vertigo    Vitamin D deficiency    Abdominal pain    Acute sinusitis    Calcification of coronary artery    Chronic constipation    Chronic obstructive pulmonary disease (Multi)    Dysuria    High alkaline phosphatase    Displacement of cervical intervertebral disc without myelopathy    High cholesterol    History of elevated lipids    History of hypothyroidism    Insect bite (nonvenomous) of unspecified elbow, sequela (CODE)    Loss of hair    Lumbar radicular pain     Nausea    Neck pain    Muscle cramps    Idiopathic urticaria    Prediabetes    Pulmonary congestion    Rotator cuff syndrome    Spinal stenosis of lumbar region with radiculopathy    Shingles    Smoker    Urinary and fecal incontinence    Diverticulitis    Diverticulitis of intestine with abscess without bleeding, unspecified part of intestinal tract       PLAN:  Continue IV meropenem till discharge  Midline in a.m.  Arrange for home IV antibiotics/outpatient infusion for 10 to 14 days  May switch to IV Invanz on discharge for ease of daily administration  Follow-up with surgery    Discussed with patient    Bella Bui MD

## 2024-05-05 NOTE — CARE PLAN
Problem: Pain  Goal: My pain/discomfort is manageable  Outcome: Progressing     Problem: Safety  Goal: Patient will be injury free during hospitalization  Outcome: Progressing  Goal: I will remain free of falls  Outcome: Progressing     Problem: Daily Care  Goal: Daily care needs are met  Outcome: Progressing     Problem: Psychosocial Needs  Goal: Demonstrates ability to cope with hospitalization/illness  Outcome: Progressing  Goal: Collaborate with me, my family, and caregiver to identify my specific goals  Outcome: Progressing     Problem: Discharge Barriers  Goal: My discharge needs are met  Outcome: Progressing     Problem: Skin  Goal: Participates in plan/prevention/treatment measures  Outcome: Progressing  Flowsheets (Taken 5/5/2024 1133)  Participates in plan/prevention/treatment measures:   Elevate heels   Increase activity/out of bed for meals  Goal: Prevent/manage excess moisture  Outcome: Progressing  Flowsheets (Taken 5/5/2024 1133)  Prevent/manage excess moisture: Monitor for/manage infection if present  Goal: Prevent/minimize sheer/friction injuries  Outcome: Progressing  Flowsheets (Taken 5/5/2024 1133)  Prevent/minimize sheer/friction injuries:   Increase activity/out of bed for meals   Turn/reposition every 2 hours/use positioning/transfer devices  Goal: Promote/optimize nutrition  Outcome: Progressing  Flowsheets (Taken 5/5/2024 1133)  Promote/optimize nutrition: Offer water/supplements/favorite foods  Goal: Promote skin healing  Outcome: Progressing  Flowsheets (Taken 5/5/2024 1133)  Promote skin healing: Assess skin/pad under line(s)/device(s)      The clinical goals for the shift include patient will have pain tolerable for shift

## 2024-05-05 NOTE — CARE PLAN
The patient's goals for the shift include  pain management     Promote restful sleep and safety    Patient declined lovenox. Educated on why ordered.   Patient states she gets up and down to bathroom. Asks if she is allowed to walk halls.   Affirmed she is. States she is not allowed without assistance. Told her to put on her call light and either I or tech would walk with her. She states her prior roommate was never walked. Repeated to let me know when she would like to walk.    By 2200 patient had not called so went to room to ask if she would like to walk unit.  Patient declined.   Did contact on call and let him know that patient declined lovenox, states she does not take blood thinners and is ambulatory.  Message acknowledged and no further orders.    Patient c/o of roommate snoring. (It is very slight) provided patient with earplugs/eyemask.  Patient c/o of roommates monitor. Reset it.  Patient wants to change clothes. Disconnected and reconnected IV lines for her.    Patient c/o pain but does not want pain medication at that time. Asked her to call me when she needs pain medication. She verbalized understanding.

## 2024-05-05 NOTE — PROGRESS NOTES
General Surgery Progress Note    Patient: Sarah Walker  Unit/Bed: 1118/1118-A  YOB: 1960  MRN: [id number]  Acct: [id number]   Admitting Diagnosis: Diverticulitis [K57.92]  Diverticulitis of intestine with abscess without bleeding, unspecified part of intestinal tract [K57.80]  Date:  5/3/2024  Hospital Day: 1  Attending: Mitchell Walton MD    Complaint:  Chief Complaint   Patient presents with    Abdominal Pain      Subjective  Patient seen and examined this morning. No acute events overnight.  She states her cramping abdominal pain is a little bit better.  She is still having some nausea as well as a headache.  She is tolerating the sips of clears and crackers.  She states the pain medication is making her very nauseous.  She also refused Lovenox yesterday evening, so we had a discussion about the reason why Lovenox is prescribed, including her age, being admitted to the hospital, and family history of DVT.  She agreed to receive Lovenox during her admission.  PHYSICAL EXAM:  Physical Exam  Constitutional:       General: She is not in acute distress.  HENT:      Head: Normocephalic and atraumatic.      Mouth/Throat:      Mouth: Mucous membranes are moist.   Eyes:      Conjunctiva/sclera: Conjunctivae normal.   Cardiovascular:      Rate and Rhythm: Normal rate and regular rhythm.   Pulmonary:      Effort: Pulmonary effort is normal. No respiratory distress.      Breath sounds: Normal breath sounds.   Abdominal:      General: Abdomen is flat. Bowel sounds are normal. There is no distension.      Palpations: Abdomen is soft.      Tenderness: There is abdominal tenderness (right-sided). There is no guarding.   Musculoskeletal:         General: No swelling.      Right lower leg: No edema.      Left lower leg: No edema.   Skin:     General: Skin is warm and dry.   Neurological:      Mental Status: She is alert.   Psychiatric:         Mood and Affect: Mood normal.         Behavior: Behavior normal.        Vital signs in last 24 hours:  Vitals:    05/05/24 1331   BP: 134/80   Pulse: 60   Resp: 16   Temp: 36.8 °C (98.2 °F)   SpO2: 96%     Intake/Output this shift:    Intake/Output Summary (Last 24 hours) at 5/5/2024 1500  Last data filed at 5/5/2024 0106  Gross per 24 hour   Intake 2100 ml   Output --   Net 2100 ml      Allergies:  Allergies   Allergen Reactions    Ciprofloxacin Hives and Other    Metronidazole Hives and Unknown    Penicillins Hives and Other    Sulfamethoxazole-Trimethoprim Hives and Unknown    Atorvastatin Rash and Other      Medications:  Scheduled medications  cholecalciferol, 2,000 Units, oral, Daily  enoxaparin, 40 mg, subcutaneous, q24h  meropenem, 1 g, intravenous, q8h  simvastatin, 40 mg, oral, Daily      Continuous medications  lactated Ringer's, 100 mL/hr, Last Rate: 100 mL/hr (05/04/24 2251)      PRN medications  PRN medications: acetaminophen **OR** acetaminophen, diphenhydrAMINE, HYDROcodone-acetaminophen, HYDROcodone-acetaminophen, HYDROmorphone, ondansetron ODT **OR** ondansetron  Labs:  Results for orders placed or performed during the hospital encounter of 05/03/24 (from the past 24 hour(s))   CBC   Result Value Ref Range    WBC 5.9 4.4 - 11.3 x10*3/uL    nRBC 0.0 0.0 - 0.0 /100 WBCs    RBC 3.83 (L) 4.00 - 5.20 x10*6/uL    Hemoglobin 11.7 (L) 12.0 - 16.0 g/dL    Hematocrit 34.9 (L) 36.0 - 46.0 %    MCV 91 80 - 100 fL    MCH 30.5 26.0 - 34.0 pg    MCHC 33.5 32.0 - 36.0 g/dL    RDW 12.5 11.5 - 14.5 %    Platelets 217 150 - 450 x10*3/uL   Basic Metabolic Panel   Result Value Ref Range    Glucose 106 (H) 74 - 99 mg/dL    Sodium 141 136 - 145 mmol/L    Potassium 3.7 3.5 - 5.3 mmol/L    Chloride 109 (H) 98 - 107 mmol/L    Bicarbonate 23 21 - 32 mmol/L    Anion Gap 13 10 - 20 mmol/L    Urea Nitrogen 6 6 - 23 mg/dL    Creatinine 0.41 (L) 0.50 - 1.05 mg/dL    eGFR >90 >60 mL/min/1.73m*2    Calcium 8.7 8.6 - 10.3 mg/dL   Magnesium   Result Value Ref Range    Magnesium 2.00 1.60 - 2.40  mg/dL   SST TOP   Result Value Ref Range    Extra Tube Hold for add-ons.       Imaging:  No results found.   Assessment  Diverticulitis with abscess    No acute events overnight.  She states her abdominal pain is a little better.  She is tolerating the sips of clears and crackers.  She states her pain medicine is giving her nausea and would like to try something else.  She refused Lovenox yesterday evening, so we had a discussion about the reason for using Lovenox, and she agreed to receive it for her admission.  On exam, abdomen is soft, nondistended, right-sided tenderness to palpation, no leg swelling.  WBC normal at 5.9.  Afebrile.  Infectious disease saw the patient and discontinued Azactam and clindamycin.  They started IV meropenem.  ID states if she tolerates meropenem and continues to improve, she can be discharged on Monday after midline placement with home IV Invanz.    Plan  -Infectious Disease consulted, appreciate recommendations  -IV clindamycin and Azactam discontinued, IV meropenem started  -Advanced diet to clear liquids, no carbonation  -Pain control -oxycodone and dilaudid changed to Norco for moderate/severe and lower dose of dilaudid for breakthrough  -Nausea control  -IV Benadryl prn itching  -IVF LR at 100 mL an hour, can discontinue if tolerating clears  -Monitor for worsening signs of infections including fever and increased abdominal pain   -Monitor BP  -Continue home medications: simvastatin 40mg and vitamin D 2000 mg daily  -DVT prophylaxis-Lovenox daily  -Encourage ambulation      Further recommendations per Dr. Anton Hagen PA-C    Time spent  40  minutes obtaining labs, imaging, recommendations, interview, assessment, examination, medication review/ordering, and EMR review.    Plan of care was discussed extensively with patient. Patient verbalized understanding through teach back method. All questions and concerns addressed upon examination.     Of note, this  documentation is completed using the Dragon Dictation system (voice recognition software). There may be spelling and/or grammatical errors that were not corrected prior to final submission.

## 2024-05-06 ENCOUNTER — APPOINTMENT (OUTPATIENT)
Dept: RADIOLOGY | Facility: HOSPITAL | Age: 64
End: 2024-05-06
Payer: COMMERCIAL

## 2024-05-06 LAB
ANION GAP SERPL CALC-SCNC: 10 MMOL/L (ref 10–20)
BUN SERPL-MCNC: 5 MG/DL (ref 6–23)
CALCIUM SERPL-MCNC: 8.7 MG/DL (ref 8.6–10.3)
CHLORIDE SERPL-SCNC: 108 MMOL/L (ref 98–107)
CO2 SERPL-SCNC: 25 MMOL/L (ref 21–32)
CREAT SERPL-MCNC: 0.43 MG/DL (ref 0.5–1.05)
EGFRCR SERPLBLD CKD-EPI 2021: >90 ML/MIN/1.73M*2
ERYTHROCYTE [DISTWIDTH] IN BLOOD BY AUTOMATED COUNT: 12.5 % (ref 11.5–14.5)
GLUCOSE SERPL-MCNC: 91 MG/DL (ref 74–99)
HCT VFR BLD AUTO: 38.3 % (ref 36–46)
HGB BLD-MCNC: 12.7 G/DL (ref 12–16)
MCH RBC QN AUTO: 30.5 PG (ref 26–34)
MCHC RBC AUTO-ENTMCNC: 33.2 G/DL (ref 32–36)
MCV RBC AUTO: 92 FL (ref 80–100)
NRBC BLD-RTO: 0 /100 WBCS (ref 0–0)
PLATELET # BLD AUTO: 234 X10*3/UL (ref 150–450)
POTASSIUM SERPL-SCNC: 3.8 MMOL/L (ref 3.5–5.3)
RBC # BLD AUTO: 4.17 X10*6/UL (ref 4–5.2)
SODIUM SERPL-SCNC: 139 MMOL/L (ref 136–145)
WBC # BLD AUTO: 6.1 X10*3/UL (ref 4.4–11.3)

## 2024-05-06 PROCEDURE — 36415 COLL VENOUS BLD VENIPUNCTURE: CPT

## 2024-05-06 PROCEDURE — 2780000003 HC OR 278 NO HCPCS

## 2024-05-06 PROCEDURE — 1100000001 HC PRIVATE ROOM DAILY

## 2024-05-06 PROCEDURE — 2500000004 HC RX 250 GENERAL PHARMACY W/ HCPCS (ALT 636 FOR OP/ED)

## 2024-05-06 PROCEDURE — 36410 VNPNXR 3YR/> PHY/QHP DX/THER: CPT

## 2024-05-06 PROCEDURE — 2500000006 HC RX 250 W HCPCS SELF ADMINISTERED DRUGS (ALT 637 FOR ALL PAYERS)

## 2024-05-06 PROCEDURE — 85027 COMPLETE CBC AUTOMATED: CPT

## 2024-05-06 PROCEDURE — 80048 BASIC METABOLIC PNL TOTAL CA: CPT

## 2024-05-06 PROCEDURE — 2500000006 HC RX 250 W HCPCS SELF ADMINISTERED DRUGS (ALT 637 FOR ALL PAYERS): Performed by: REGISTERED NURSE

## 2024-05-06 PROCEDURE — 2500000001 HC RX 250 WO HCPCS SELF ADMINISTERED DRUGS (ALT 637 FOR MEDICARE OP)

## 2024-05-06 PROCEDURE — C1751 CATH, INF, PER/CENT/MIDLINE: HCPCS

## 2024-05-06 PROCEDURE — 2500000004 HC RX 250 GENERAL PHARMACY W/ HCPCS (ALT 636 FOR OP/ED): Performed by: PHARMACIST

## 2024-05-06 PROCEDURE — 99231 SBSQ HOSP IP/OBS SF/LOW 25: CPT | Performed by: SURGERY

## 2024-05-06 RX ORDER — ERTAPENEM 1 G/1
1 INJECTION, POWDER, LYOPHILIZED, FOR SOLUTION INTRAMUSCULAR; INTRAVENOUS EVERY 24 HOURS
Status: DISCONTINUED | OUTPATIENT
Start: 2024-05-07 | End: 2024-05-07 | Stop reason: HOSPADM

## 2024-05-06 RX ORDER — HYDROXYZINE PAMOATE 25 MG/1
25 CAPSULE ORAL ONCE
Status: COMPLETED | OUTPATIENT
Start: 2024-05-06 | End: 2024-05-06

## 2024-05-06 RX ADMIN — SODIUM CHLORIDE, SODIUM LACTATE, POTASSIUM CHLORIDE, AND CALCIUM CHLORIDE 100 ML/HR: 600; 310; 30; 20 INJECTION, SOLUTION INTRAVENOUS at 10:45

## 2024-05-06 RX ADMIN — ENOXAPARIN SODIUM 40 MG: 40 INJECTION SUBCUTANEOUS at 10:26

## 2024-05-06 RX ADMIN — HYDROXYZINE PAMOATE 25 MG: 25 CAPSULE ORAL at 10:26

## 2024-05-06 RX ADMIN — MEROPENEM 1 G: 1 INJECTION, POWDER, FOR SOLUTION INTRAVENOUS at 11:38

## 2024-05-06 RX ADMIN — HYDROCODONE BITARTRATE AND ACETAMINOPHEN 1 TABLET: 5; 325 TABLET ORAL at 11:48

## 2024-05-06 RX ADMIN — SIMVASTATIN 40 MG: 40 TABLET, FILM COATED ORAL at 09:36

## 2024-05-06 RX ADMIN — SODIUM CHLORIDE, SODIUM LACTATE, POTASSIUM CHLORIDE, AND CALCIUM CHLORIDE 100 ML/HR: 600; 310; 30; 20 INJECTION, SOLUTION INTRAVENOUS at 21:27

## 2024-05-06 RX ADMIN — HYDROCODONE BITARTRATE AND ACETAMINOPHEN 1 TABLET: 5; 325 TABLET ORAL at 17:55

## 2024-05-06 RX ADMIN — MEROPENEM 1 G: 1 INJECTION, POWDER, FOR SOLUTION INTRAVENOUS at 04:25

## 2024-05-06 RX ADMIN — Medication 2000 UNITS: at 06:22

## 2024-05-06 RX ADMIN — HYDROCODONE BITARTRATE AND ACETAMINOPHEN 1 TABLET: 10; 325 TABLET ORAL at 04:32

## 2024-05-06 ASSESSMENT — COGNITIVE AND FUNCTIONAL STATUS - GENERAL
MOBILITY SCORE: 24
DAILY ACTIVITIY SCORE: 24
MOBILITY SCORE: 24

## 2024-05-06 ASSESSMENT — PAIN - FUNCTIONAL ASSESSMENT
PAIN_FUNCTIONAL_ASSESSMENT: 0-10

## 2024-05-06 ASSESSMENT — PAIN SCALES - GENERAL
PAINLEVEL_OUTOF10: 5 - MODERATE PAIN
PAINLEVEL_OUTOF10: 0 - NO PAIN
PAINLEVEL_OUTOF10: 7
PAINLEVEL_OUTOF10: 5 - MODERATE PAIN
PAINLEVEL_OUTOF10: 5 - MODERATE PAIN
PAINLEVEL_OUTOF10: 0 - NO PAIN

## 2024-05-06 ASSESSMENT — PAIN DESCRIPTION - LOCATION: LOCATION: ABDOMEN

## 2024-05-06 NOTE — PROGRESS NOTES
05/06/24 1048   Discharge Planning   Living Arrangements Alone   Support Systems Children   Assistance Needed IV ABX   Type of Residence Private residence   Who is requesting discharge planning? Provider   Patient expects to be discharged to: Home   Does the patient need discharge transport arranged? No   Patient Choice   Provider Choice list and CMS website (https://medicare.gov/care-compare#search) for post-acute Quality and Resource Measure Data were provided and reviewed with: Patient     Notified that pt will need IV ABX on discharge. ID following. Per ID notes notified pt changed to meropenem & plan for IV Invanz  Q24h on discharge. Midline placed by IR nurse. Met with pt & discussed options. Pt reports she lives alone but does have a car & drives. Her preference is for out pt infusion if daily IV Abx. Pt voiced understanding she would need to drive to infusion center daily & hospital on weekends. Pending final orders & script. Nurse Alicea present in room. Care transitions team to cont to follow for final IV ABX orders, script & dc orders.

## 2024-05-06 NOTE — PROGRESS NOTES
General Surgery Progress Note    Patient: Sarah Walker  Unit/Bed: 1118/1118-A  YOB: 1960  MRN: [id number]  Acct: [id number]   Admitting Diagnosis: Diverticulitis [K57.92]  Diverticulitis of intestine with abscess without bleeding, unspecified part of intestinal tract [K57.80]  Date:  5/3/2024  Hospital Day: 2  Attending: Mitchell Walton MD    Complaint:  Chief Complaint   Patient presents with    Abdominal Pain      Subjective  Patient seen and examined this morning. No acute events overnight.  Patient denies nausea vomiting.  She states she has tenderness to the lower quadrants of her abdomen.  She states she is not passing any gas, and has not had a bowel movement in 3 days.    PHYSICAL EXAM:  Physical Exam  Vitals reviewed.   Constitutional:       General: She is not in acute distress.  HENT:      Head: Normocephalic.      Nose: Nose normal.      Mouth/Throat:      Mouth: Mucous membranes are moist.   Cardiovascular:      Rate and Rhythm: Normal rate.   Pulmonary:      Effort: Pulmonary effort is normal.   Abdominal:      General: Bowel sounds are normal.      Palpations: Abdomen is soft.      Tenderness: There is abdominal tenderness in the right lower quadrant and left lower quadrant.   Skin:     General: Skin is warm.      Capillary Refill: Capillary refill takes less than 2 seconds.   Neurological:      General: No focal deficit present.      Mental Status: She is alert and oriented to person, place, and time.   Psychiatric:         Mood and Affect: Mood normal.       Vital signs in last 24 hours:  Vitals:    05/06/24 0430   BP: 117/59   Pulse: 61   Resp:    Temp: 37.2 °C (99 °F)   SpO2: 96%     Intake/Output this shift:    Intake/Output Summary (Last 24 hours) at 5/6/2024 0895  Last data filed at 5/6/2024 0455  Gross per 24 hour   Intake 200 ml   Output --   Net 200 ml      Allergies:  Allergies   Allergen Reactions    Ciprofloxacin Hives and Other    Metronidazole Hives and Unknown     Penicillins Hives and Other    Sulfamethoxazole-Trimethoprim Hives and Unknown    Atorvastatin Rash and Other      Medications:  Scheduled medications  cholecalciferol, 2,000 Units, oral, Daily  enoxaparin, 40 mg, subcutaneous, q24h  lidocaine, 5 mL, infiltration, Once  meropenem, 1 g, intravenous, q8h  simvastatin, 40 mg, oral, Daily      Continuous medications  lactated Ringer's, 100 mL/hr, Last Rate: 100 mL/hr (05/05/24 2101)      PRN medications  PRN medications: acetaminophen **OR** acetaminophen, diphenhydrAMINE, HYDROcodone-acetaminophen, HYDROcodone-acetaminophen, HYDROmorphone, ondansetron ODT **OR** ondansetron  Labs:  Results for orders placed or performed during the hospital encounter of 05/03/24 (from the past 24 hour(s))   CBC   Result Value Ref Range    WBC 6.1 4.4 - 11.3 x10*3/uL    nRBC 0.0 0.0 - 0.0 /100 WBCs    RBC 4.17 4.00 - 5.20 x10*6/uL    Hemoglobin 12.7 12.0 - 16.0 g/dL    Hematocrit 38.3 36.0 - 46.0 %    MCV 92 80 - 100 fL    MCH 30.5 26.0 - 34.0 pg    MCHC 33.2 32.0 - 36.0 g/dL    RDW 12.5 11.5 - 14.5 %    Platelets 234 150 - 450 x10*3/uL   Basic Metabolic Panel   Result Value Ref Range    Glucose 91 74 - 99 mg/dL    Sodium 139 136 - 145 mmol/L    Potassium 3.8 3.5 - 5.3 mmol/L    Chloride 108 (H) 98 - 107 mmol/L    Bicarbonate 25 21 - 32 mmol/L    Anion Gap 10 10 - 20 mmol/L    Urea Nitrogen 5 (L) 6 - 23 mg/dL    Creatinine 0.43 (L) 0.50 - 1.05 mg/dL    eGFR >90 >60 mL/min/1.73m*2    Calcium 8.7 8.6 - 10.3 mg/dL      Imaging:  No results found.     Assessment  Diverticulitis with a intramural abscess     On exam abdomen is soft, nondistended, abdominal tenderness with palpation to right lower quadrant and left lower quadrant.  Bowel sounds present x 4 quadrants.  Midline placed this morning.  Labs this morning show no leukocytosis.  Afebrile.    Plan  -Advance to low fiber diet  -Continue IV ABX  -Continue IVF  -Pain control  -Nausea control  -DVT prophylaxis-subcu Lovenox  -Pulmonary  toileting   -Encourage ambulation  -Consider KUB if patient's abdomen becomes distended  -Awaiting ID final recommendations      Further recommendations per JOVANNY Guzman-CNP    Time spent  37  minutes obtaining labs, imaging, recommendations, interview, assessment, examination, medication review/ordering, and EMR review.    Plan of care was discussed extensively with patient. Patient verbalized understanding through teach back method. All questions and concerns addressed upon examination.     Of note, this documentation is completed using the Dragon Dictation system (voice recognition software). There may be spelling and/or grammatical errors that were not corrected prior to final submission.

## 2024-05-06 NOTE — PROCEDURES
Pre-Procedure Checklist:  Emergent Line Insertion: No  Type of Line to be Placed: Midline  Consent Obtained: N/A  Emergency Medication Necessary: No  Patient Identified with 2 Independent Identifiers: Yes  Review of Allergies, Anticoagulation, Relevant Labs, ECG/Telemetry: Yes  Risks/Benefits/Alternatives Discussed with Patient/POA/Legal Representative: Yes  Stop Sign on Door: Yes  Time Out Performed: Yes  Catheter Exchange: No    Positioning Checklist:  All People, Including Patient, in the Room with Cap and Mask: Yes  Fluoroscopy Used to Identify Vessel and Guide Insertion: No   Sterile Cover Used: Yes  Full Barrier Precautions Followed (Mask, Cap, Gown, Gloves): Yes  Hands Washed: Yes  Monitors Attached with Sound Alarms On: No  Full Body Sterile Drape (Head-to-Toe) Used to Cover Patient: Yes  Trendelenburg Position (For IJ and Subclavian): No  CHG Skin Prep Used and Allowed to Air Dry to Skin Procedure: Yes    Procedure Checklist:  Blood Aspirated From All Lumens, All Ports Subsequently Flushed: Yes  Catheter Caps Placed on All Lumens; Lumens Clamped: Yes  Maintain Guidewire Control Throughout, Ensuring Guidewire Removal: Yes  Maintain Sterile Field Throughout Insertion: Yes  Catheter Secured: Yes  Confirmatory Test of Venous Placement: Non-Pulsatile Blood    Post Procedure Checklist:  Date and Time Written on Dressing: Yes  Sharp and Wire Count and Safe Disposal of all Sharps/Wires: Yes  Sterile Dressing Applied Per Protocol: Yes  X-ray Ordered or ECG Image: N/A    Midline Insertion Details:  Midline expires in 28 days date 6/3/2024  See LDA assessment for further midline details.  Additional Details: Line was inserted using Modified Seldinger's Technique.   Midline ready for immediate use.  Placed by: Paulina Mercedes RN

## 2024-05-06 NOTE — DISCHARGE INSTRUCTIONS
Please call the office to schedule follow-up appoint with Dr. Walton after you finish antibiotics     Please go to the infusion center daily to receive IV antibiotics.  On weekends will have to come to the hospital as the infusion center is closed. Infusion center will flush midline and will change dressing.   Please complete entire course of IV antibiotic   You may shower.  Cover midline.  Pat area dry.  Do not submerge in a bath tub, hot tub or swimming pool until after follow up and get clearance.    Call office or come to ER if:  You have a fever of 100.4 Fahrenheit  You cannot tolerate food or water  Unable to urinate  New or worsening of symptoms  Chest pain or shortness of breath    Do not drive or operate machinery while taking narcotics.

## 2024-05-06 NOTE — CARE PLAN
The patient's goals for the shift include      The clinical goals for the shift include pain control    Over the shift, the patient did not make progress toward the following goals. Barriers to progression include h/o diverticulitis, small abscess. Recommendations to address these barriers include IV fluids, and antibiotics, offer pain meds, hourly rounding.

## 2024-05-07 VITALS
OXYGEN SATURATION: 98 % | DIASTOLIC BLOOD PRESSURE: 72 MMHG | WEIGHT: 150 LBS | BODY MASS INDEX: 26.58 KG/M2 | HEART RATE: 76 BPM | HEIGHT: 63 IN | SYSTOLIC BLOOD PRESSURE: 129 MMHG | RESPIRATION RATE: 16 BRPM | TEMPERATURE: 98.1 F

## 2024-05-07 LAB
ANION GAP SERPL CALC-SCNC: 9 MMOL/L (ref 10–20)
BACTERIA BLD CULT: NORMAL
BACTERIA BLD CULT: NORMAL
BUN SERPL-MCNC: 8 MG/DL (ref 6–23)
CALCIUM SERPL-MCNC: 9.1 MG/DL (ref 8.6–10.3)
CHLORIDE SERPL-SCNC: 108 MMOL/L (ref 98–107)
CO2 SERPL-SCNC: 30 MMOL/L (ref 21–32)
CREAT SERPL-MCNC: 0.46 MG/DL (ref 0.5–1.05)
EGFRCR SERPLBLD CKD-EPI 2021: >90 ML/MIN/1.73M*2
ERYTHROCYTE [DISTWIDTH] IN BLOOD BY AUTOMATED COUNT: 12.2 % (ref 11.5–14.5)
GLUCOSE SERPL-MCNC: 109 MG/DL (ref 74–99)
HCT VFR BLD AUTO: 40.4 % (ref 36–46)
HGB BLD-MCNC: 13.5 G/DL (ref 12–16)
HOLD SPECIMEN: NORMAL
MCH RBC QN AUTO: 30.3 PG (ref 26–34)
MCHC RBC AUTO-ENTMCNC: 33.4 G/DL (ref 32–36)
MCV RBC AUTO: 91 FL (ref 80–100)
NRBC BLD-RTO: 0 /100 WBCS (ref 0–0)
PLATELET # BLD AUTO: 311 X10*3/UL (ref 150–450)
POTASSIUM SERPL-SCNC: 4.2 MMOL/L (ref 3.5–5.3)
RBC # BLD AUTO: 4.45 X10*6/UL (ref 4–5.2)
SODIUM SERPL-SCNC: 143 MMOL/L (ref 136–145)
WBC # BLD AUTO: 5.8 X10*3/UL (ref 4.4–11.3)

## 2024-05-07 PROCEDURE — 2500000001 HC RX 250 WO HCPCS SELF ADMINISTERED DRUGS (ALT 637 FOR MEDICARE OP)

## 2024-05-07 PROCEDURE — 85027 COMPLETE CBC AUTOMATED: CPT

## 2024-05-07 PROCEDURE — 2500000004 HC RX 250 GENERAL PHARMACY W/ HCPCS (ALT 636 FOR OP/ED)

## 2024-05-07 PROCEDURE — 80048 BASIC METABOLIC PNL TOTAL CA: CPT

## 2024-05-07 PROCEDURE — 2500000006 HC RX 250 W HCPCS SELF ADMINISTERED DRUGS (ALT 637 FOR ALL PAYERS)

## 2024-05-07 PROCEDURE — 2500000004 HC RX 250 GENERAL PHARMACY W/ HCPCS (ALT 636 FOR OP/ED): Performed by: REGISTERED NURSE

## 2024-05-07 RX ORDER — ERTAPENEM 1 G/1
1 INJECTION, POWDER, LYOPHILIZED, FOR SOLUTION INTRAMUSCULAR; INTRAVENOUS DAILY
Qty: 14 EACH | Refills: 0 | Status: SHIPPED | OUTPATIENT
Start: 2024-05-07 | End: 2024-05-07

## 2024-05-07 RX ORDER — HYDROCODONE BITARTRATE AND ACETAMINOPHEN 5; 325 MG/1; MG/1
1 TABLET ORAL EVERY 6 HOURS PRN
Qty: 20 TABLET | Refills: 0 | Status: SHIPPED | OUTPATIENT
Start: 2024-05-07 | End: 2024-05-07

## 2024-05-07 RX ORDER — DOCUSATE SODIUM 100 MG/1
100 CAPSULE, LIQUID FILLED ORAL 2 TIMES DAILY
Qty: 60 CAPSULE | Refills: 0 | Status: SHIPPED | OUTPATIENT
Start: 2024-05-07 | End: 2024-06-06

## 2024-05-07 RX ORDER — HYDROCODONE BITARTRATE AND ACETAMINOPHEN 5; 325 MG/1; MG/1
1 TABLET ORAL EVERY 6 HOURS PRN
Qty: 20 TABLET | Refills: 0 | Status: SHIPPED | OUTPATIENT
Start: 2024-05-07

## 2024-05-07 RX ORDER — DOCUSATE SODIUM 100 MG/1
100 CAPSULE, LIQUID FILLED ORAL 2 TIMES DAILY
Qty: 60 CAPSULE | Refills: 0 | Status: SHIPPED | OUTPATIENT
Start: 2024-05-07 | End: 2024-05-07

## 2024-05-07 RX ORDER — ERTAPENEM 1 G/1
1 INJECTION, POWDER, LYOPHILIZED, FOR SOLUTION INTRAMUSCULAR; INTRAVENOUS DAILY
Qty: 14 EACH | Refills: 0 | Status: SHIPPED | OUTPATIENT
Start: 2024-05-07

## 2024-05-07 RX ADMIN — ERTAPENEM SODIUM 1 G: 1 INJECTION, POWDER, LYOPHILIZED, FOR SOLUTION INTRAMUSCULAR; INTRAVENOUS at 08:38

## 2024-05-07 RX ADMIN — ENOXAPARIN SODIUM 40 MG: 40 INJECTION SUBCUTANEOUS at 10:58

## 2024-05-07 RX ADMIN — HYDROCODONE BITARTRATE AND ACETAMINOPHEN 1 TABLET: 5; 325 TABLET ORAL at 06:30

## 2024-05-07 RX ADMIN — Medication 2000 UNITS: at 06:28

## 2024-05-07 RX ADMIN — SIMVASTATIN 40 MG: 40 TABLET, FILM COATED ORAL at 08:38

## 2024-05-07 RX ADMIN — SODIUM CHLORIDE, SODIUM LACTATE, POTASSIUM CHLORIDE, AND CALCIUM CHLORIDE 100 ML/HR: 600; 310; 30; 20 INJECTION, SOLUTION INTRAVENOUS at 06:37

## 2024-05-07 RX ADMIN — HYDROCODONE BITARTRATE AND ACETAMINOPHEN 1 TABLET: 5; 325 TABLET ORAL at 12:48

## 2024-05-07 ASSESSMENT — COGNITIVE AND FUNCTIONAL STATUS - GENERAL
DAILY ACTIVITIY SCORE: 24
MOBILITY SCORE: 24

## 2024-05-07 ASSESSMENT — PAIN - FUNCTIONAL ASSESSMENT
PAIN_FUNCTIONAL_ASSESSMENT: 0-10
PAIN_FUNCTIONAL_ASSESSMENT: 0-10

## 2024-05-07 ASSESSMENT — PAIN SCALES - GENERAL
PAINLEVEL_OUTOF10: 4
PAINLEVEL_OUTOF10: 5 - MODERATE PAIN

## 2024-05-07 ASSESSMENT — PAIN DESCRIPTION - LOCATION: LOCATION: ABDOMEN

## 2024-05-07 NOTE — DISCHARGE SUMMARY
Discharge Diagnosis  Diverticulitis    Issues Requiring Follow-Up  Please call the office to schedule follow-up appoint with Dr. Walton after you finish antibiotics     Test Results Pending At Discharge  Pending Labs       No current pending labs.            Hospital Course  Patient is a 64-year-old female that presented to the emergency room due to bilateral lower abdominal pain and constipation for the past 6 days.  CT of abdomen and pelvis was completed on admission and showed diverticulitis with 16 mm intramural abscess.  Also showed a white count of 12.2 on admission.  General surgery consulted for diverticulitis with abscess.  Patient was started on clear liquid diet, IVF, infectious disease consulted, IV antibiotics.  Patient's hospitalization were without complications. Patient's WBC today is 5.8.  Patient is currently tolerating low fiber diet.  Passing flatulence, bowel movement this morning.  Patient will be discharged with IV Invanz for the next 14 days.  She is to go to the infusion center daily, and on weekends come to the hospital for infusions.  Patient will be discharged today in stable condition home.    Pertinent Physical Exam At Time of Discharge  Physical Exam  Vitals reviewed.   Constitutional:       General: She is not in acute distress.     Appearance: Normal appearance.   HENT:      Head: Normocephalic.      Nose: Nose normal.      Mouth/Throat:      Mouth: Mucous membranes are moist.   Cardiovascular:      Rate and Rhythm: Normal rate.   Pulmonary:      Effort: Pulmonary effort is normal.   Abdominal:      General: Bowel sounds are normal.      Palpations: Abdomen is soft.      Tenderness: There is abdominal tenderness (Cramping intermittently) in the left lower quadrant.      Comments: Patient states she is passing gas, had a bowel movement this morning.   Skin:     General: Skin is warm.      Capillary Refill: Capillary refill takes less than 2 seconds.   Neurological:      General: No  focal deficit present.      Mental Status: She is alert and oriented to person, place, and time.   Psychiatric:         Mood and Affect: Mood normal.       Home Medications     Medication List      START taking these medications     docusate sodium 100 mg capsule; Commonly known as: Colace; Take 1   capsule (100 mg) by mouth 2 times a day.   ertapenem IV; Commonly known as: INVanz; Infuse 50 mL (1 g) over 30   minutes into a venous catheter once daily.   HYDROcodone-acetaminophen 5-325 mg tablet; Commonly known as: Norco;   Take 1 tablet by mouth every 6 hours if needed for moderate pain (4 - 6)   or severe pain (7 - 10).     CONTINUE taking these medications     albuterol 90 mcg/actuation inhaler   chlorhexidine 0.12 % solution; Commonly known as: Peridex   cholecalciferol 50 MCG (2000 UT) tablet; Commonly known as: Vitamin D-3   clobetasol 0.05 % ointment; Commonly known as: Temovate; apply to   affected area twice a day if needed   cyclobenzaprine 10 mg tablet; Commonly known as: Flexeril   doxepin 25 mg capsule; Commonly known as: SINEquan   EPINEPHrine 0.3 mg/0.3 mL injection syringe; Commonly known as: Epipen;   Inject 0.3 mL (0.3 mg) into the muscle 1 time if needed for anaphylaxis   for up to 1 dose.   famotidine 20 mg tablet; Commonly known as: Pepcid   fexofenadine 180 mg tablet; Commonly known as: Allegra; Take one tablet   in the AM   fluticasone 50 mcg/actuation nasal spray; Commonly known as: Flonase;   Administer 2 sprays into each nostril once daily.   hydrocortisone 2.5 % cream   hydrOXYzine HCL 25 mg tablet; Commonly known as: Atarax; Take 1 tablet   (25 mg) by mouth every 8 hours if needed for itching.   hydrOXYzine pamoate 25 mg capsule; Commonly known as: Vistaril   latanoprost 0.005 % ophthalmic solution; Commonly known as: Xalatan   levocetirizine 5 mg tablet; Commonly known as: Xyzal; Take 1 tablet at   bedtime   loratadine 10 mg tablet; Commonly known as: Claritin   montelukast 10 mg tablet;  Commonly known as: Singulair   Mucinex 600 mg 12 hr tablet; Generic drug: guaiFENesin   simvastatin 40 mg tablet; Commonly known as: Zocor; Take 1 tablet (40   mg) by mouth once daily at bedtime.   timolol 0.5 % ophthalmic solution; Commonly known as: Timoptic       Outpatient Follow-Up  Future Appointments   Date Time Provider Department Center   5/29/2024  2:30 PM Panfilo Christina DO [id number]     Time spent  40  minutes obtaining labs, imaging, recommendations, interview, assessment, examination, medication review/ordering, and EMR review.    Plan of care was discussed extensively with patient. Patient verbalized understanding through teach back method. All questions and concerns addressed upon examination.     Of note, this documentation is completed using the Dragon Dictation system (voice recognition software). There may be spelling and/or grammatical errors that were not corrected prior to final submission.     Bertha Salazar, APRN-CNP

## 2024-05-07 NOTE — CARE PLAN
The patient's goals for the shift include      The clinical goals for the shift include maintain safety    Over the shift, the patient did not make progress toward the following goals. Barriers to progression include . Recommendations to address these barriers include .

## 2024-05-07 NOTE — CARE PLAN
The patient's goals for the shift include  increase activity    The clinical goals for the shift include pain control, increase activity, and maintain safety.   Problem: Pain  Goal: My pain/discomfort is manageable  Outcome: Progressing     Problem: Safety  Goal: Patient will be injury free during hospitalization  Outcome: Progressing  Goal: I will remain free of falls  Outcome: Progressing     Problem: Daily Care  Goal: Daily care needs are met  Outcome: Progressing     Problem: Psychosocial Needs  Goal: Demonstrates ability to cope with hospitalization/illness  Outcome: Progressing  Goal: Collaborate with me, my family, and caregiver to identify my specific goals  Outcome: Progressing     Problem: Discharge Barriers  Goal: My discharge needs are met  Outcome: Progressing     Problem: Skin  Goal: Participates in plan/prevention/treatment measures  Outcome: Progressing  Goal: Prevent/manage excess moisture  Outcome: Progressing  Goal: Prevent/minimize sheer/friction injuries  Outcome: Progressing  Goal: Promote/optimize nutrition  Outcome: Progressing  Goal: Promote skin healing  Outcome: Progressing     Problem: Pain  Goal: Takes deep breaths with improved pain control throughout the shift  Outcome: Progressing  Goal: Turns in bed with improved pain control throughout the shift  Outcome: Progressing  Goal: Walks with improved pain control throughout the shift  Outcome: Progressing  Goal: Performs ADL's with improved pain control throughout shift  Outcome: Progressing  Goal: Participates in PT with improved pain control throughout the shift  Outcome: Progressing  Goal: Free from opioid side effects throughout the shift  Outcome: Progressing  Goal: Free from acute confusion related to pain meds throughout the shift  Outcome: Progressing     Problem: Fall/Injury  Goal: Not fall by end of shift  Outcome: Progressing  Goal: Be free from injury by end of the shift  Outcome: Progressing  Goal: Verbalize understanding of  personal risk factors for fall in the hospital  Outcome: Progressing  Goal: Verbalize understanding of risk factor reduction measures to prevent injury from fall in the home  Outcome: Progressing  Goal: Use assistive devices by end of the shift  Outcome: Progressing  Goal: Pace activities to prevent fatigue by end of the shift  Outcome: Progressing     Problem: Pain - Adult  Goal: Verbalizes/displays adequate comfort level or baseline comfort level  Outcome: Progressing     Problem: Safety - Adult  Goal: Free from fall injury  Outcome: Progressing     Problem: Discharge Planning  Goal: Discharge to home or other facility with appropriate resources  Outcome: Progressing     Problem: Chronic Conditions and Co-morbidities  Goal: Patient's chronic conditions and co-morbidity symptoms are monitored and maintained or improved  Outcome: Progressing

## 2024-05-07 NOTE — PROGRESS NOTES
05/07/24 1209   Discharge Planning   Living Arrangements Alone   Assistance Needed IV Invanz q24 for 14 days   Type of Residence Private residence   Who is requesting discharge planning? Provider   Does the patient need discharge transport arranged? No   Patient Choice   Provider Choice list and CMS website (https://medicare.gov/care-compare#search) for post-acute Quality and Resource Measure Data were provided and reviewed with: Patient     Met with pt to confirm plan for OUTPT infusion for IV Invanz. Faxed script & facesheet to [facility]. Plan for discharge today with SOC tomorrow morning. Left VM for Libra @ Infusion Center.   128pm update; Pt scheduled for 0830am @ [facility] starting 5/8. Updated pt who voiced understanding. M-F infusion center, weekends to report to [facility] ER.

## 2024-05-08 ENCOUNTER — INFUSION (OUTPATIENT)
Dept: HEMATOLOGY/ONCOLOGY | Facility: CLINIC | Age: 64
End: 2024-05-08
Payer: COMMERCIAL

## 2024-05-08 VITALS
BODY MASS INDEX: 26.58 KG/M2 | RESPIRATION RATE: 20 BRPM | HEIGHT: 63 IN | OXYGEN SATURATION: 97 % | WEIGHT: 150 LBS | HEART RATE: 71 BPM | DIASTOLIC BLOOD PRESSURE: 72 MMHG | SYSTOLIC BLOOD PRESSURE: 122 MMHG | TEMPERATURE: 97.7 F

## 2024-05-08 DIAGNOSIS — K57.80 DIVERTICULITIS OF INTESTINE WITH ABSCESS WITHOUT BLEEDING, UNSPECIFIED PART OF INTESTINAL TRACT: ICD-10-CM

## 2024-05-08 PROCEDURE — 2500000004 HC RX 250 GENERAL PHARMACY W/ HCPCS (ALT 636 FOR OP/ED): Performed by: REGISTERED NURSE

## 2024-05-08 PROCEDURE — 96365 THER/PROPH/DIAG IV INF INIT: CPT | Mod: INF

## 2024-05-08 RX ORDER — ERTAPENEM 1 G/1
1 INJECTION, POWDER, LYOPHILIZED, FOR SOLUTION INTRAMUSCULAR; INTRAVENOUS ONCE
Qty: 50 ML | Refills: 0 | Status: COMPLETED | OUTPATIENT
Start: 2024-05-08 | End: 2024-05-08

## 2024-05-08 RX ADMIN — ERTAPENEM SODIUM 1 G: 1 INJECTION, POWDER, LYOPHILIZED, FOR SOLUTION INTRAMUSCULAR; INTRAVENOUS at 09:03

## 2024-05-08 ASSESSMENT — PAIN SCALES - GENERAL: PAINLEVEL: 5

## 2024-05-08 NOTE — PROGRESS NOTES
0840:  Patient hospitalized this past Friday through Tuesday.  Had Invanz yesterday in hospital.  Lexicomp info on Invanz given to patient.

## 2024-05-08 NOTE — PROGRESS NOTES
Pt given info on low fiber diet. Patient tolerated antibiotic infusion without sign of adverse reaction.  To return daily.  Discharged in stable condition.

## 2024-05-09 ENCOUNTER — PATIENT OUTREACH (OUTPATIENT)
Dept: CARE COORDINATION | Facility: CLINIC | Age: 64
End: 2024-05-09

## 2024-05-09 ENCOUNTER — LAB REQUISITION (OUTPATIENT)
Dept: LAB | Facility: HOSPITAL | Age: 64
End: 2024-05-09
Payer: COMMERCIAL

## 2024-05-09 ENCOUNTER — INFUSION (OUTPATIENT)
Dept: HEMATOLOGY/ONCOLOGY | Facility: CLINIC | Age: 64
End: 2024-05-09
Payer: COMMERCIAL

## 2024-05-09 VITALS
HEIGHT: 63 IN | OXYGEN SATURATION: 100 % | RESPIRATION RATE: 20 BRPM | SYSTOLIC BLOOD PRESSURE: 115 MMHG | BODY MASS INDEX: 26.56 KG/M2 | WEIGHT: 149.91 LBS | DIASTOLIC BLOOD PRESSURE: 71 MMHG | HEART RATE: 60 BPM | TEMPERATURE: 98.2 F

## 2024-05-09 DIAGNOSIS — L50.9 URTICARIA, UNSPECIFIED: ICD-10-CM

## 2024-05-09 DIAGNOSIS — K57.92 DIVERTICULITIS: ICD-10-CM

## 2024-05-09 PROCEDURE — 96365 THER/PROPH/DIAG IV INF INIT: CPT | Mod: INF

## 2024-05-09 PROCEDURE — 86352 CELL FUNCTION ASSAY W/STIM: CPT

## 2024-05-09 PROCEDURE — 36415 COLL VENOUS BLD VENIPUNCTURE: CPT

## 2024-05-09 PROCEDURE — 2500000004 HC RX 250 GENERAL PHARMACY W/ HCPCS (ALT 636 FOR OP/ED): Performed by: REGISTERED NURSE

## 2024-05-09 RX ORDER — ERTAPENEM 1 G/1
1 INJECTION, POWDER, LYOPHILIZED, FOR SOLUTION INTRAMUSCULAR; INTRAVENOUS ONCE
Qty: 50 ML | Refills: 0 | Status: COMPLETED | OUTPATIENT
Start: 2024-05-09 | End: 2024-05-09

## 2024-05-09 RX ADMIN — ERTAPENEM SODIUM 1 G: 1 INJECTION, POWDER, LYOPHILIZED, FOR SOLUTION INTRAMUSCULAR; INTRAVENOUS at 09:23

## 2024-05-09 ASSESSMENT — PAIN SCALES - GENERAL: PAINLEVEL: 0-NO PAIN

## 2024-05-09 NOTE — PROGRESS NOTES
Discharge Facility: [facility]  Discharge Diagnosis: diverticulitis  Admission Date: 5/3/24  Discharge Date: 5/7/24    PCP Appointment Date: 5/29/24  Specialist Appointment Date: ROOPA Walton  Hospital Encounter and Summary: Linked or not available at this time (pick one)  Unable to reach patient for hospital follow up assessment, possibly will follow with Dr Walton upon completion of IV antibiotics for diverticulitis. Daily infusions at infusion center then weekends at [facility] ER.

## 2024-05-10 ENCOUNTER — INFUSION (OUTPATIENT)
Dept: HEMATOLOGY/ONCOLOGY | Facility: CLINIC | Age: 64
End: 2024-05-10
Payer: COMMERCIAL

## 2024-05-10 VITALS
DIASTOLIC BLOOD PRESSURE: 81 MMHG | TEMPERATURE: 97.9 F | HEART RATE: 69 BPM | SYSTOLIC BLOOD PRESSURE: 131 MMHG | RESPIRATION RATE: 20 BRPM | OXYGEN SATURATION: 98 %

## 2024-05-10 DIAGNOSIS — K57.92 DIVERTICULITIS: ICD-10-CM

## 2024-05-10 PROCEDURE — 2500000004 HC RX 250 GENERAL PHARMACY W/ HCPCS (ALT 636 FOR OP/ED)

## 2024-05-10 PROCEDURE — 96365 THER/PROPH/DIAG IV INF INIT: CPT | Mod: INF

## 2024-05-10 RX ORDER — ERTAPENEM 1 G/1
1 INJECTION, POWDER, LYOPHILIZED, FOR SOLUTION INTRAMUSCULAR; INTRAVENOUS ONCE
Status: COMPLETED | OUTPATIENT
Start: 2024-05-10 | End: 2024-05-10

## 2024-05-10 RX ADMIN — ERTAPENEM SODIUM 1 G: 1 INJECTION, POWDER, LYOPHILIZED, FOR SOLUTION INTRAMUSCULAR; INTRAVENOUS at 10:16

## 2024-05-11 ENCOUNTER — HOSPITAL ENCOUNTER (OUTPATIENT)
Facility: HOSPITAL | Age: 64
Discharge: HOME | End: 2024-05-11
Attending: INTERNAL MEDICINE | Admitting: INTERNAL MEDICINE
Payer: COMMERCIAL

## 2024-05-11 PROCEDURE — 2500000004 HC RX 250 GENERAL PHARMACY W/ HCPCS (ALT 636 FOR OP/ED): Performed by: REGISTERED NURSE

## 2024-05-11 RX ORDER — ERTAPENEM 1 G/1
1 INJECTION, POWDER, LYOPHILIZED, FOR SOLUTION INTRAMUSCULAR; INTRAVENOUS ONCE
Status: COMPLETED | OUTPATIENT
Start: 2024-05-11 | End: 2024-05-11

## 2024-05-11 RX ADMIN — ERTAPENEM SODIUM 1 G: 1 INJECTION, POWDER, LYOPHILIZED, FOR SOLUTION INTRAMUSCULAR; INTRAVENOUS at 12:40

## 2024-05-12 ENCOUNTER — HOSPITAL ENCOUNTER (OUTPATIENT)
Facility: HOSPITAL | Age: 64
Discharge: HOME | End: 2024-05-12
Attending: INTERNAL MEDICINE | Admitting: INTERNAL MEDICINE
Payer: COMMERCIAL

## 2024-05-12 PROCEDURE — 2500000004 HC RX 250 GENERAL PHARMACY W/ HCPCS (ALT 636 FOR OP/ED): Performed by: REGISTERED NURSE

## 2024-05-12 RX ORDER — ERTAPENEM 1 G/1
1 INJECTION, POWDER, LYOPHILIZED, FOR SOLUTION INTRAMUSCULAR; INTRAVENOUS ONCE
Status: COMPLETED | OUTPATIENT
Start: 2024-05-12 | End: 2024-05-12

## 2024-05-12 RX ADMIN — ERTAPENEM SODIUM 1 G: 1 INJECTION, POWDER, LYOPHILIZED, FOR SOLUTION INTRAMUSCULAR; INTRAVENOUS at 12:33

## 2024-05-13 ENCOUNTER — LAB REQUISITION (OUTPATIENT)
Dept: LAB | Facility: HOSPITAL | Age: 64
End: 2024-05-13
Payer: COMMERCIAL

## 2024-05-13 ENCOUNTER — INFUSION (OUTPATIENT)
Dept: HEMATOLOGY/ONCOLOGY | Facility: CLINIC | Age: 64
End: 2024-05-13
Payer: COMMERCIAL

## 2024-05-13 VITALS
DIASTOLIC BLOOD PRESSURE: 56 MMHG | RESPIRATION RATE: 20 BRPM | OXYGEN SATURATION: 98 % | WEIGHT: 149.91 LBS | BODY MASS INDEX: 26.56 KG/M2 | TEMPERATURE: 97.9 F | SYSTOLIC BLOOD PRESSURE: 108 MMHG | HEART RATE: 67 BPM

## 2024-05-13 DIAGNOSIS — K57.92 DIVERTICULITIS: ICD-10-CM

## 2024-05-13 DIAGNOSIS — K57.92 DIVERTICULITIS OF INTESTINE, PART UNSPECIFIED, WITHOUT PERFORATION OR ABSCESS WITHOUT BLEEDING: ICD-10-CM

## 2024-05-13 DIAGNOSIS — K57.80 DIVERTICULITIS OF INTESTINE WITH ABSCESS WITHOUT BLEEDING, UNSPECIFIED PART OF INTESTINAL TRACT: ICD-10-CM

## 2024-05-13 LAB
ANION GAP SERPL CALC-SCNC: 14 MMOL/L (ref 10–20)
BUN SERPL-MCNC: 6 MG/DL (ref 6–23)
CALCIUM SERPL-MCNC: 9.5 MG/DL (ref 8.6–10.3)
CHLORIDE SERPL-SCNC: 103 MMOL/L (ref 98–107)
CO2 SERPL-SCNC: 24 MMOL/L (ref 21–32)
CREAT SERPL-MCNC: 0.37 MG/DL (ref 0.5–1.05)
CRP SERPL-MCNC: 9.77 MG/DL
EGFRCR SERPLBLD CKD-EPI 2021: >90 ML/MIN/1.73M*2
ERYTHROCYTE [DISTWIDTH] IN BLOOD BY AUTOMATED COUNT: 12.4 % (ref 11.5–14.5)
GLUCOSE SERPL-MCNC: 101 MG/DL (ref 74–99)
HCT VFR BLD AUTO: 37.4 % (ref 36–46)
HGB BLD-MCNC: 13.2 G/DL (ref 12–16)
MCH RBC QN AUTO: 30.6 PG (ref 26–34)
MCHC RBC AUTO-ENTMCNC: 35.3 G/DL (ref 32–36)
MCV RBC AUTO: 87 FL (ref 80–100)
NRBC BLD-RTO: 0 /100 WBCS (ref 0–0)
PLATELET # BLD AUTO: 368 X10*3/UL (ref 150–450)
POTASSIUM SERPL-SCNC: 3.7 MMOL/L (ref 3.5–5.3)
RBC # BLD AUTO: 4.31 X10*6/UL (ref 4–5.2)
SODIUM SERPL-SCNC: 137 MMOL/L (ref 136–145)
WBC # BLD AUTO: 13.1 X10*3/UL (ref 4.4–11.3)

## 2024-05-13 PROCEDURE — 96365 THER/PROPH/DIAG IV INF INIT: CPT | Mod: INF

## 2024-05-13 PROCEDURE — 80048 BASIC METABOLIC PNL TOTAL CA: CPT

## 2024-05-13 PROCEDURE — 85027 COMPLETE CBC AUTOMATED: CPT

## 2024-05-13 PROCEDURE — 86140 C-REACTIVE PROTEIN: CPT

## 2024-05-13 PROCEDURE — 36415 COLL VENOUS BLD VENIPUNCTURE: CPT

## 2024-05-13 PROCEDURE — 2500000004 HC RX 250 GENERAL PHARMACY W/ HCPCS (ALT 636 FOR OP/ED): Performed by: REGISTERED NURSE

## 2024-05-13 RX ORDER — ERTAPENEM 1 G/1
1 INJECTION, POWDER, LYOPHILIZED, FOR SOLUTION INTRAMUSCULAR; INTRAVENOUS ONCE
Status: COMPLETED | OUTPATIENT
Start: 2024-05-13 | End: 2024-05-13

## 2024-05-13 RX ADMIN — ERTAPENEM SODIUM 1 G: 1 INJECTION, POWDER, LYOPHILIZED, FOR SOLUTION INTRAMUSCULAR; INTRAVENOUS at 13:46

## 2024-05-13 ASSESSMENT — PAIN SCALES - GENERAL: PAINLEVEL: 4

## 2024-05-13 NOTE — PROGRESS NOTES
1345:  Midline care completed and lab work drawn.    1440:  Patient tolerated antibiotic infusion without sign of adverse reaction.  To return daily.  Discharged in stable condition.

## 2024-05-14 ENCOUNTER — INFUSION (OUTPATIENT)
Dept: HEMATOLOGY/ONCOLOGY | Facility: CLINIC | Age: 64
End: 2024-05-14
Payer: COMMERCIAL

## 2024-05-14 VITALS
TEMPERATURE: 96.8 F | HEIGHT: 63 IN | OXYGEN SATURATION: 100 % | BODY MASS INDEX: 26.05 KG/M2 | RESPIRATION RATE: 20 BRPM | DIASTOLIC BLOOD PRESSURE: 76 MMHG | HEART RATE: 50 BPM | WEIGHT: 147 LBS | SYSTOLIC BLOOD PRESSURE: 147 MMHG

## 2024-05-14 DIAGNOSIS — K57.32 DIVERTICULITIS OF COLON: ICD-10-CM

## 2024-05-14 PROCEDURE — 2500000004 HC RX 250 GENERAL PHARMACY W/ HCPCS (ALT 636 FOR OP/ED)

## 2024-05-14 PROCEDURE — 96365 THER/PROPH/DIAG IV INF INIT: CPT | Mod: INF

## 2024-05-14 RX ORDER — ERTAPENEM 1 G/1
1 INJECTION, POWDER, LYOPHILIZED, FOR SOLUTION INTRAMUSCULAR; INTRAVENOUS ONCE
Status: COMPLETED | OUTPATIENT
Start: 2024-05-14 | End: 2024-05-14

## 2024-05-14 RX ORDER — ERTAPENEM 1 G/1
1 INJECTION, POWDER, LYOPHILIZED, FOR SOLUTION INTRAMUSCULAR; INTRAVENOUS ONCE
Status: DISCONTINUED | OUTPATIENT
Start: 2024-05-14 | End: 2024-05-14 | Stop reason: SDUPTHER

## 2024-05-14 RX ADMIN — ERTAPENEM SODIUM 1 G: 1 INJECTION, POWDER, LYOPHILIZED, FOR SOLUTION INTRAMUSCULAR; INTRAVENOUS at 14:20

## 2024-05-14 ASSESSMENT — PAIN SCALES - GENERAL: PAINLEVEL: 10-WORST PAIN EVER

## 2024-05-14 NOTE — PROGRESS NOTES
"Pt arrived c/o of \"throwing up\" since getting up to up day can't keep anything down pt c/o no bowel  , only very sm amts for 2 days. \"Belly pain Number  10 on scale. Pt also c/o chills Temp 36.0 Pt states \" going to ER when she leaves here, pt did not want ambulance called.    1520 ATB complete, Pt still c/o ABD pain, encouraged pt to go to ER for evaluation to make sure her diverticulitis is not worse and call her PCP or Dr Bui, Pt states she is going to ER. Pt discharged stable.  "

## 2024-05-15 ENCOUNTER — HOSPITAL ENCOUNTER (EMERGENCY)
Facility: HOSPITAL | Age: 64
Discharge: HOME | End: 2024-05-15
Attending: EMERGENCY MEDICINE
Payer: COMMERCIAL

## 2024-05-15 ENCOUNTER — APPOINTMENT (OUTPATIENT)
Dept: RADIOLOGY | Facility: HOSPITAL | Age: 64
End: 2024-05-15
Payer: COMMERCIAL

## 2024-05-15 ENCOUNTER — INFUSION (OUTPATIENT)
Dept: HEMATOLOGY/ONCOLOGY | Facility: CLINIC | Age: 64
End: 2024-05-15
Payer: COMMERCIAL

## 2024-05-15 VITALS
DIASTOLIC BLOOD PRESSURE: 70 MMHG | OXYGEN SATURATION: 98 % | HEART RATE: 65 BPM | SYSTOLIC BLOOD PRESSURE: 115 MMHG | TEMPERATURE: 97.2 F | RESPIRATION RATE: 20 BRPM

## 2024-05-15 VITALS
TEMPERATURE: 97.7 F | SYSTOLIC BLOOD PRESSURE: 104 MMHG | BODY MASS INDEX: 25.69 KG/M2 | DIASTOLIC BLOOD PRESSURE: 60 MMHG | HEIGHT: 63 IN | OXYGEN SATURATION: 96 % | HEART RATE: 59 BPM | WEIGHT: 145 LBS | RESPIRATION RATE: 18 BRPM

## 2024-05-15 DIAGNOSIS — K57.32 DIVERTICULITIS OF COLON: ICD-10-CM

## 2024-05-15 DIAGNOSIS — E87.6 HYPOKALEMIA: ICD-10-CM

## 2024-05-15 DIAGNOSIS — J45.909 ASTHMA, UNSPECIFIED ASTHMA SEVERITY, UNSPECIFIED WHETHER COMPLICATED, UNSPECIFIED WHETHER PERSISTENT (HHS-HCC): ICD-10-CM

## 2024-05-15 DIAGNOSIS — K57.92 DIVERTICULITIS: Primary | ICD-10-CM

## 2024-05-15 DIAGNOSIS — J44.9 CHRONIC OBSTRUCTIVE PULMONARY DISEASE, UNSPECIFIED COPD TYPE (MULTI): Primary | ICD-10-CM

## 2024-05-15 LAB
ALBUMIN SERPL BCP-MCNC: 4.1 G/DL (ref 3.4–5)
ALP SERPL-CCNC: 85 U/L (ref 33–136)
ALT SERPL W P-5'-P-CCNC: 17 U/L (ref 7–45)
ANION GAP SERPL CALC-SCNC: 12 MMOL/L (ref 10–20)
AST SERPL W P-5'-P-CCNC: 14 U/L (ref 9–39)
BASOPHILS # BLD AUTO: 0.07 X10*3/UL (ref 0–0.1)
BASOPHILS NFR BLD AUTO: 0.7 %
BILIRUB DIRECT SERPL-MCNC: 0 MG/DL (ref 0–0.3)
BILIRUB SERPL-MCNC: 0.3 MG/DL (ref 0–1.2)
BUN SERPL-MCNC: 6 MG/DL (ref 6–23)
CALCIUM SERPL-MCNC: 9 MG/DL (ref 8.6–10.3)
CHLORIDE SERPL-SCNC: 101 MMOL/L (ref 98–107)
CO2 SERPL-SCNC: 27 MMOL/L (ref 21–32)
CREAT SERPL-MCNC: 0.5 MG/DL (ref 0.5–1.05)
EGFRCR SERPLBLD CKD-EPI 2021: >90 ML/MIN/1.73M*2
EOSINOPHIL # BLD AUTO: 0.04 X10*3/UL (ref 0–0.7)
EOSINOPHIL NFR BLD AUTO: 0.4 %
ERYTHROCYTE [DISTWIDTH] IN BLOOD BY AUTOMATED COUNT: 12.2 % (ref 11.5–14.5)
GLUCOSE SERPL-MCNC: 157 MG/DL (ref 74–99)
HCT VFR BLD AUTO: 38.2 % (ref 36–46)
HGB BLD-MCNC: 13.3 G/DL (ref 12–16)
IMM GRANULOCYTES # BLD AUTO: 0.02 X10*3/UL (ref 0–0.7)
IMM GRANULOCYTES NFR BLD AUTO: 0.2 % (ref 0–0.9)
LACTATE SERPL-SCNC: 1.3 MMOL/L (ref 0.4–2)
LIPASE SERPL-CCNC: 23 U/L (ref 9–82)
LYMPHOCYTES # BLD AUTO: 2.93 X10*3/UL (ref 1.2–4.8)
LYMPHOCYTES NFR BLD AUTO: 31.1 %
MAGNESIUM SERPL-MCNC: 2 MG/DL (ref 1.6–2.4)
MCH RBC QN AUTO: 30.8 PG (ref 26–34)
MCHC RBC AUTO-ENTMCNC: 34.8 G/DL (ref 32–36)
MCV RBC AUTO: 88 FL (ref 80–100)
MONOCYTES # BLD AUTO: 0.72 X10*3/UL (ref 0.1–1)
MONOCYTES NFR BLD AUTO: 7.6 %
NEUTROPHILS # BLD AUTO: 5.65 X10*3/UL (ref 1.2–7.7)
NEUTROPHILS NFR BLD AUTO: 60 %
NRBC BLD-RTO: 0 /100 WBCS (ref 0–0)
PLATELET # BLD AUTO: 426 X10*3/UL (ref 150–450)
POTASSIUM SERPL-SCNC: 3.1 MMOL/L (ref 3.5–5.3)
PROT SERPL-MCNC: 7.4 G/DL (ref 6.4–8.2)
RBC # BLD AUTO: 4.32 X10*6/UL (ref 4–5.2)
SODIUM SERPL-SCNC: 137 MMOL/L (ref 136–145)
WBC # BLD AUTO: 9.4 X10*3/UL (ref 4.4–11.3)

## 2024-05-15 PROCEDURE — 85025 COMPLETE CBC W/AUTO DIFF WBC: CPT | Performed by: EMERGENCY MEDICINE

## 2024-05-15 PROCEDURE — 2500000006 HC RX 250 W HCPCS SELF ADMINISTERED DRUGS (ALT 637 FOR ALL PAYERS): Performed by: EMERGENCY MEDICINE

## 2024-05-15 PROCEDURE — 83605 ASSAY OF LACTIC ACID: CPT | Performed by: EMERGENCY MEDICINE

## 2024-05-15 PROCEDURE — 2550000001 HC RX 255 CONTRASTS: Performed by: EMERGENCY MEDICINE

## 2024-05-15 PROCEDURE — 82248 BILIRUBIN DIRECT: CPT | Performed by: EMERGENCY MEDICINE

## 2024-05-15 PROCEDURE — 74177 CT ABD & PELVIS W/CONTRAST: CPT | Mod: FOREIGN READ | Performed by: RADIOLOGY

## 2024-05-15 PROCEDURE — 80053 COMPREHEN METABOLIC PANEL: CPT | Performed by: EMERGENCY MEDICINE

## 2024-05-15 PROCEDURE — 96374 THER/PROPH/DIAG INJ IV PUSH: CPT

## 2024-05-15 PROCEDURE — 96361 HYDRATE IV INFUSION ADD-ON: CPT

## 2024-05-15 PROCEDURE — 96365 THER/PROPH/DIAG IV INF INIT: CPT | Mod: INF

## 2024-05-15 PROCEDURE — 2500000004 HC RX 250 GENERAL PHARMACY W/ HCPCS (ALT 636 FOR OP/ED): Performed by: EMERGENCY MEDICINE

## 2024-05-15 PROCEDURE — 2500000004 HC RX 250 GENERAL PHARMACY W/ HCPCS (ALT 636 FOR OP/ED): Performed by: REGISTERED NURSE

## 2024-05-15 PROCEDURE — 83735 ASSAY OF MAGNESIUM: CPT | Performed by: EMERGENCY MEDICINE

## 2024-05-15 PROCEDURE — 99284 EMERGENCY DEPT VISIT MOD MDM: CPT | Mod: 25

## 2024-05-15 PROCEDURE — 74177 CT ABD & PELVIS W/CONTRAST: CPT

## 2024-05-15 PROCEDURE — 83690 ASSAY OF LIPASE: CPT | Performed by: EMERGENCY MEDICINE

## 2024-05-15 RX ORDER — ERTAPENEM 1 G/1
1 INJECTION, POWDER, LYOPHILIZED, FOR SOLUTION INTRAMUSCULAR; INTRAVENOUS ONCE
Status: COMPLETED | OUTPATIENT
Start: 2024-05-15 | End: 2024-05-15

## 2024-05-15 RX ORDER — POTASSIUM CHLORIDE 20 MEQ/1
20 TABLET, EXTENDED RELEASE ORAL ONCE
Status: COMPLETED | OUTPATIENT
Start: 2024-05-15 | End: 2024-05-15

## 2024-05-15 RX ORDER — MORPHINE SULFATE 4 MG/ML
4 INJECTION, SOLUTION INTRAMUSCULAR; INTRAVENOUS ONCE
Status: DISCONTINUED | OUTPATIENT
Start: 2024-05-15 | End: 2024-05-15 | Stop reason: HOSPADM

## 2024-05-15 RX ORDER — ONDANSETRON HYDROCHLORIDE 2 MG/ML
4 INJECTION, SOLUTION INTRAVENOUS ONCE
Status: COMPLETED | OUTPATIENT
Start: 2024-05-15 | End: 2024-05-15

## 2024-05-15 RX ORDER — ONDANSETRON 4 MG/1
4 TABLET, ORALLY DISINTEGRATING ORAL EVERY 8 HOURS PRN
Qty: 15 TABLET | Refills: 0 | Status: SHIPPED | OUTPATIENT
Start: 2024-05-15 | End: 2024-05-20

## 2024-05-15 RX ADMIN — SODIUM CHLORIDE 1000 ML: 9 INJECTION, SOLUTION INTRAVENOUS at 15:45

## 2024-05-15 RX ADMIN — POTASSIUM CHLORIDE 20 MEQ: 1500 TABLET, EXTENDED RELEASE ORAL at 17:42

## 2024-05-15 RX ADMIN — IOHEXOL 75 ML: 350 INJECTION, SOLUTION INTRAVENOUS at 16:16

## 2024-05-15 RX ADMIN — ONDANSETRON 4 MG: 2 INJECTION INTRAMUSCULAR; INTRAVENOUS at 15:44

## 2024-05-15 RX ADMIN — ERTAPENEM SODIUM 1 G: 1 INJECTION, POWDER, LYOPHILIZED, FOR SOLUTION INTRAMUSCULAR; INTRAVENOUS at 14:15

## 2024-05-15 ASSESSMENT — LIFESTYLE VARIABLES
EVER HAD A DRINK FIRST THING IN THE MORNING TO STEADY YOUR NERVES TO GET RID OF A HANGOVER: NO
EVER FELT BAD OR GUILTY ABOUT YOUR DRINKING: NO
HAVE PEOPLE ANNOYED YOU BY CRITICIZING YOUR DRINKING: NO
TOTAL SCORE: 0
HAVE YOU EVER FELT YOU SHOULD CUT DOWN ON YOUR DRINKING: NO

## 2024-05-15 ASSESSMENT — COLUMBIA-SUICIDE SEVERITY RATING SCALE - C-SSRS
6. HAVE YOU EVER DONE ANYTHING, STARTED TO DO ANYTHING, OR PREPARED TO DO ANYTHING TO END YOUR LIFE?: NO
2. HAVE YOU ACTUALLY HAD ANY THOUGHTS OF KILLING YOURSELF?: NO
1. IN THE PAST MONTH, HAVE YOU WISHED YOU WERE DEAD OR WISHED YOU COULD GO TO SLEEP AND NOT WAKE UP?: NO

## 2024-05-15 ASSESSMENT — PAIN SCALES - GENERAL
PAINLEVEL_OUTOF10: 6
PAINLEVEL: 4

## 2024-05-15 ASSESSMENT — PAIN - FUNCTIONAL ASSESSMENT: PAIN_FUNCTIONAL_ASSESSMENT: 0-10

## 2024-05-15 NOTE — ED PROVIDER NOTES
HPI   Chief Complaint   Patient presents with    Illness       HPI: 64-year-old female states that she is currently getting IV antibiotics through a PICC line for diverticulitis.  She states that she was sent over because she told them that she has been having worsening pain.  She states that she had seen Dr. Bustos for the diverticulitis.  She states that she is having worsening pain in her left lower quadrant.  She states that she is also been having some nausea and vomiting.  No fevers.  No chest pain.  No shortness of breath.  She states that they did not do surgery for the diverticulitis.  She states that she is due to have IV antibiotics every day for another week.  She states that she did fine with the infusion today.  In looking at the records it appears that she has been getting ertapenem.  She states that she has multiple other allergies to antibiotics.    Family HX: Denies any significant/pertinent family history.  Social Hx: Denies ETOH or drug use.  Review of systems:  Gen.: No weight loss, fatigue, anorexia, insomnia, fever.   Eyes: No vision loss, double vision, drainage, eye pain.   ENT: No pharyngitis, dry mouth.   Cardiac: No chest pain, palpitations, syncope, near syncope.   Pulmonary: No shortness of breath, cough, hemoptysis.   Heme/lymph: No swollen glands, fever, bleeding.   GI: No  change in bowel habits, melena, hematemesis, hematochezia, diarrhea.   : No discharge, dysuria, frequency, urgency, hematuria.   Musculoskeletal: No limb pain, joint pain, joint swelling.   Skin: No rashes.   Psych: No depression, anxiety, suicidality, homicidality.   Review of systems is otherwise negative unless stated above or in history of present illness.    Physical Exam:    Appearance: Alert, oriented , cooperative,  in no acute distress. Well nourished & well hydrated.    Skin: Intact,  dry skin, no lesions, rash, petechiae or purpura.     Eyes: PERRLA, EOMs intact,  Conjunctiva pink with no redness or  exudates. Eyelids without lesions. No scleral icterus.     ENT: Hearing grossly intact. External auditory canals patent, tympanic membranes intact with visible landmarks. Nares patent, mucus membranes moist. Dentition without lesions. Pharynx clear, uvula midline.     Neck: Supple, without meningismus. Thyroid not palpable. Trachea at midline. No lymphadenopathy.    Pulmonary: Clear bilaterally with good chest wall excursion. No rales, rhonchi or wheezing. No accessory muscle use or stridor.    Cardiac: Normal S1, S2 without murmur, rub, gallop or extrasystole. No JVD, Carotids without bruits.    Abdomen: Soft, nontender, active bowel sounds.  No palpable organomegaly.  No rebound or guarding.  No CVA tenderness.    Genitourinary: Exam deferred.    Musculoskeletal: Full range of motion. no pain, edema, or deformity. Pulses full and equal. No cyanosis, clubbing, or edema.  PICC line in the left arm without redness or drainage    Neurological:  Cranial nerves II through XII are grossly intact, finger-nose touch is normal, normal sensation, no weakness, no focal findings identified.    Psychiatric: Appropriate mood and affect.     Medical Decision-Making:  Testing: Labs are reviewed.  Patient was given IV fluids.  Differential includes worsening diverticulitis, perforation, abscess, bowel obstruction, sepsis.  Patient has a normal white count.  We did do a CT abdomen pelvis as well.  CT abdomen pelvis shows diverticuli without evidence of acute diverticulitis.  Patient was given IV morphine.  He was given IV fluids and IV Zofran.  Reevaluation she states that she feels improved.  Her potassium was 3.1 and this was repleted p.o.  She is tolerating apple juice.  Discussed with her that I would recommend that she continue her ertapenem infusions until she is complete.  She states that she understands.  She has Norco at home for pain.  I will add a prescription for Zofran should she need it however she is tolerating p.o.  here in the emergency department.  She does not appear septic or toxic.  Return for worsening symptoms, fevers, vomiting, decreased urine output or any other concerns  Treatment:   Reevaluation:   Plan: Homegoing. Discussed differential. Will follow-up with the primary physician in the next 2-3 days. Return if worse. They understand return precautions and discharge instructions. Patient and family/friend/caregiver are in agreement with this plan.   Impression:   1.  Diverticulitis  2.  Labs Reviewed  BASIC METABOLIC PANEL - Abnormal     Glucose                       157 (*)                Sodium                        137                    Potassium                     3.1 (*)                Chloride                      101                    Bicarbonate                   27                     Anion Gap                     12                     Urea Nitrogen                 6                      Creatinine                    0.50                   eGFR                          >90                    Calcium                       9.0                 LIPASE - Normal     Lipase                        23                       Narrative: Venipuncture immediately after or during the administration of Metamizole may lead to falsely low results. Testing should be performed immediately prior to Metamizole dosing.  LACTATE - Normal     Lactate                       1.3                      Narrative: Venipuncture immediately after or during the administration of Metamizole may lead to falsely low results. Testing should be performed immediately                prior to Metamizole dosing.  HEPATIC FUNCTION PANEL - Normal     Albumin                       4.1                    Bilirubin, Total              0.3                    Bilirubin, Direct             0.0                    Alkaline Phosphatase          85                     ALT                           17                     AST                           14                      Total Protein                 7.4                 MAGNESIUM - Normal     Magnesium                     2.00                CBC WITH AUTO DIFFERENTIAL     WBC                           9.4                    nRBC                          0.0                    RBC                           4.32                   Hemoglobin                    13.3                   Hematocrit                    38.2                   MCV                           88                     MCH                           30.8                   MCHC                          34.8                   RDW                           12.2                   Platelets                     426                    Neutrophils %                 60.0                   Immature Granulocytes %, Automated   0.2                    Lymphocytes %                 31.1                   Monocytes %                   7.6                    Eosinophils %                 0.4                    Basophils %                   0.7                    Neutrophils Absolute          5.65                   Immature Granulocytes Absolute, Au*   0.02                   Lymphocytes Absolute          2.93                   Monocytes Absolute            0.72                   Eosinophils Absolute          0.04                   Basophils Absolute            0.07                URINALYSIS WITH REFLEX CULTURE AND MICROSCOPIC       Narrative: The following orders were created for panel order Urinalysis with Reflex Culture and Microscopic.                Procedure                               Abnormality         Status                                   ---------                               -----------         ------                                   Urinalysis with Reflex C...[[id number]]                                                               Extra Urine Gray Tube[[id number]]                                                                                     Please  view results for these tests on the individual orders.  URINALYSIS WITH REFLEX CULTURE AND MICROSCOPIC  EXTRA URINE GRAY TUBE     CT abdomen pelvis w IV contrast   Final Result    No acute pathology. There are multiple diverticula in the descending    and sigmoid colon, but no evidence for diverticulitis.    Signed by Jn Castillo MD                                  No data recorded                   Patient History   Past Medical History:   Diagnosis Date    Diverticulitis     Personal history of other diseases of the musculoskeletal system and connective tissue 11/06/2014    History of osteoarthritis    Personal history of other endocrine, nutritional and metabolic disease     History of hyperlipidemia    Personal history of other endocrine, nutritional and metabolic disease 11/06/2014    History of hypothyroidism     Past Surgical History:   Procedure Laterality Date    BACK SURGERY  08/31/2017    Back Surgery    DILATION AND CURETTAGE OF UTERUS  11/06/2014    Dilation And Curettage    HYSTERECTOMY  10/30/2017    Hysterectomy     Family History   Problem Relation Name Age of Onset    Liver cancer Father      Depression Sister      Arthritis Sister      Asthma Brother      Depression Daughter       Social History     Tobacco Use    Smoking status: Former     Types: Cigarettes    Smokeless tobacco: Never   Vaping Use    Vaping status: Never Used   Substance Use Topics    Alcohol use: Yes    Drug use: Never       Physical Exam   ED Triage Vitals   Temperature Heart Rate Respirations BP   05/15/24 1455 05/15/24 1455 05/15/24 1455 05/15/24 1455   36.5 °C (97.7 °F) 65 19 (!) 116/99      Pulse Ox Temp src Heart Rate Source Patient Position   05/15/24 1455 -- -- 05/15/24 1542   97 %   Sitting      BP Location FiO2 (%)     05/15/24 1542 --     Right arm        Physical Exam    ED Course & MDM   Diagnoses as of 05/15/24 1739   Diverticulitis   Hypokalemia       Medical Decision Making      Procedure  Procedures      Vangie Singh MD  05/15/24 1737       Vangie Singh MD  05/15/24 1735

## 2024-05-15 NOTE — PROGRESS NOTES
Pt on arrival states she called Dr Walton's office and spoke with MA who told  her to go to ER. Pt arrived for her appointment in outpatient, states feels 'little better today c/ o number 4 on the pain scale in abd, still not eating but no vomiting today, no temp, Labs were faxed to Dr Bui's office.  I called Dr Walton office and left message for MA and they were informed of elevated WBC and elevated CRP. Waiting for call back. Pt again instructed to go to ER like office wanted. Pt discharged in stable condition

## 2024-05-16 ENCOUNTER — INFUSION (OUTPATIENT)
Dept: HEMATOLOGY/ONCOLOGY | Facility: CLINIC | Age: 64
End: 2024-05-16
Payer: COMMERCIAL

## 2024-05-16 VITALS
SYSTOLIC BLOOD PRESSURE: 122 MMHG | RESPIRATION RATE: 20 BRPM | OXYGEN SATURATION: 97 % | TEMPERATURE: 97.9 F | DIASTOLIC BLOOD PRESSURE: 67 MMHG | HEART RATE: 67 BPM

## 2024-05-16 DIAGNOSIS — K57.92 DIVERTICULITIS: ICD-10-CM

## 2024-05-16 PROCEDURE — 96365 THER/PROPH/DIAG IV INF INIT: CPT | Mod: INF

## 2024-05-16 PROCEDURE — 2500000004 HC RX 250 GENERAL PHARMACY W/ HCPCS (ALT 636 FOR OP/ED): Performed by: REGISTERED NURSE

## 2024-05-16 RX ORDER — ERTAPENEM 1 G/1
1 INJECTION, POWDER, LYOPHILIZED, FOR SOLUTION INTRAMUSCULAR; INTRAVENOUS ONCE
Status: COMPLETED | OUTPATIENT
Start: 2024-05-16 | End: 2024-05-16

## 2024-05-16 RX ADMIN — ERTAPENEM SODIUM 1 G: 1 INJECTION, POWDER, LYOPHILIZED, FOR SOLUTION INTRAMUSCULAR; INTRAVENOUS at 13:45

## 2024-05-16 ASSESSMENT — PAIN SCALES - GENERAL: PAINLEVEL: 3

## 2024-05-17 ENCOUNTER — INFUSION (OUTPATIENT)
Dept: HEMATOLOGY/ONCOLOGY | Facility: CLINIC | Age: 64
End: 2024-05-17
Payer: COMMERCIAL

## 2024-05-17 VITALS
SYSTOLIC BLOOD PRESSURE: 141 MMHG | DIASTOLIC BLOOD PRESSURE: 72 MMHG | TEMPERATURE: 98.2 F | HEART RATE: 57 BPM | RESPIRATION RATE: 20 BRPM | OXYGEN SATURATION: 96 %

## 2024-05-17 DIAGNOSIS — K57.80 DIVERTICULITIS OF INTESTINE WITH ABSCESS WITHOUT BLEEDING, UNSPECIFIED PART OF INTESTINAL TRACT: ICD-10-CM

## 2024-05-17 PROCEDURE — 2500000004 HC RX 250 GENERAL PHARMACY W/ HCPCS (ALT 636 FOR OP/ED): Performed by: REGISTERED NURSE

## 2024-05-17 PROCEDURE — 96365 THER/PROPH/DIAG IV INF INIT: CPT | Mod: INF

## 2024-05-17 RX ORDER — ERTAPENEM 1 G/1
1 INJECTION, POWDER, LYOPHILIZED, FOR SOLUTION INTRAMUSCULAR; INTRAVENOUS ONCE
Status: COMPLETED | OUTPATIENT
Start: 2024-05-17 | End: 2024-05-17

## 2024-05-17 RX ADMIN — ERTAPENEM SODIUM 1 G: 1 INJECTION, POWDER, LYOPHILIZED, FOR SOLUTION INTRAMUSCULAR; INTRAVENOUS at 13:46

## 2024-05-17 ASSESSMENT — PAIN SCALES - GENERAL: PAINLEVEL: 3

## 2024-05-17 NOTE — PROGRESS NOTES
Follow-up acute diverticulitis of sigmoid colon with abscess.  Tolerated meropenem.  Has slight upper extremity infiltrated IV site

## 2024-05-17 NOTE — PROGRESS NOTES
Patient was seen at [facility] on May 4, 2024 as a new consult for acute diverticulitis of the colon with 1.5 cm intramural abscess in a patient with multiple drug allergies.  I switched patient from Azactam clindamycin to meropenem that she tolerated well.

## 2024-05-18 ENCOUNTER — HOSPITAL ENCOUNTER (OUTPATIENT)
Facility: HOSPITAL | Age: 64
Discharge: HOME | End: 2024-05-18
Attending: INTERNAL MEDICINE | Admitting: INTERNAL MEDICINE
Payer: COMMERCIAL

## 2024-05-18 PROCEDURE — 2500000004 HC RX 250 GENERAL PHARMACY W/ HCPCS (ALT 636 FOR OP/ED): Performed by: REGISTERED NURSE

## 2024-05-18 RX ORDER — ERTAPENEM 1 G/1
1 INJECTION, POWDER, LYOPHILIZED, FOR SOLUTION INTRAMUSCULAR; INTRAVENOUS ONCE
Status: COMPLETED | OUTPATIENT
Start: 2024-05-18 | End: 2024-05-18

## 2024-05-18 RX ADMIN — ERTAPENEM SODIUM 1 G: 1 INJECTION, POWDER, LYOPHILIZED, FOR SOLUTION INTRAMUSCULAR; INTRAVENOUS at 12:36

## 2024-05-19 ENCOUNTER — HOSPITAL ENCOUNTER (OUTPATIENT)
Facility: HOSPITAL | Age: 64
Discharge: HOME HEALTH | End: 2024-05-19
Attending: INTERNAL MEDICINE | Admitting: INTERNAL MEDICINE
Payer: COMMERCIAL

## 2024-05-19 PROCEDURE — 2500000004 HC RX 250 GENERAL PHARMACY W/ HCPCS (ALT 636 FOR OP/ED)

## 2024-05-19 RX ORDER — ERTAPENEM 1 G/1
1 INJECTION, POWDER, LYOPHILIZED, FOR SOLUTION INTRAMUSCULAR; INTRAVENOUS ONCE
Status: COMPLETED | OUTPATIENT
Start: 2024-05-19 | End: 2024-05-19

## 2024-05-19 RX ADMIN — ERTAPENEM SODIUM 1 G: 1 INJECTION, POWDER, LYOPHILIZED, FOR SOLUTION INTRAMUSCULAR; INTRAVENOUS at 12:51

## 2024-05-20 ENCOUNTER — INFUSION (OUTPATIENT)
Dept: HEMATOLOGY/ONCOLOGY | Facility: CLINIC | Age: 64
End: 2024-05-20
Payer: COMMERCIAL

## 2024-05-20 VITALS
RESPIRATION RATE: 20 BRPM | OXYGEN SATURATION: 98 % | DIASTOLIC BLOOD PRESSURE: 70 MMHG | HEART RATE: 62 BPM | SYSTOLIC BLOOD PRESSURE: 124 MMHG | TEMPERATURE: 98.2 F

## 2024-05-20 DIAGNOSIS — K57.92 DIVERTICULITIS: ICD-10-CM

## 2024-05-20 PROCEDURE — 96365 THER/PROPH/DIAG IV INF INIT: CPT | Mod: INF

## 2024-05-20 PROCEDURE — 2500000004 HC RX 250 GENERAL PHARMACY W/ HCPCS (ALT 636 FOR OP/ED): Performed by: REGISTERED NURSE

## 2024-05-20 RX ORDER — ERTAPENEM 1 G/1
1 INJECTION, POWDER, LYOPHILIZED, FOR SOLUTION INTRAMUSCULAR; INTRAVENOUS ONCE
Status: COMPLETED | OUTPATIENT
Start: 2024-05-20 | End: 2024-05-20

## 2024-05-20 RX ADMIN — ERTAPENEM SODIUM 1 G: 1 INJECTION, POWDER, LYOPHILIZED, FOR SOLUTION INTRAMUSCULAR; INTRAVENOUS at 13:56

## 2024-05-20 ASSESSMENT — PAIN SCALES - GENERAL: PAINLEVEL: 3

## 2024-05-21 ENCOUNTER — LAB REQUISITION (OUTPATIENT)
Dept: LAB | Facility: HOSPITAL | Age: 64
End: 2024-05-21
Payer: COMMERCIAL

## 2024-05-21 ENCOUNTER — OFFICE VISIT (OUTPATIENT)
Dept: INFECTIOUS DISEASES | Age: 64
End: 2024-05-21
Payer: COMMERCIAL

## 2024-05-21 ENCOUNTER — INFUSION (OUTPATIENT)
Dept: HEMATOLOGY/ONCOLOGY | Facility: CLINIC | Age: 64
End: 2024-05-21
Payer: COMMERCIAL

## 2024-05-21 VITALS
SYSTOLIC BLOOD PRESSURE: 128 MMHG | TEMPERATURE: 97.2 F | HEIGHT: 63 IN | HEART RATE: 67 BPM | DIASTOLIC BLOOD PRESSURE: 74 MMHG | WEIGHT: 147 LBS | RESPIRATION RATE: 16 BRPM | BODY MASS INDEX: 26.05 KG/M2

## 2024-05-21 VITALS
HEART RATE: 71 BPM | RESPIRATION RATE: 20 BRPM | WEIGHT: 145.06 LBS | DIASTOLIC BLOOD PRESSURE: 59 MMHG | BODY MASS INDEX: 25.7 KG/M2 | TEMPERATURE: 97.9 F | SYSTOLIC BLOOD PRESSURE: 109 MMHG | OXYGEN SATURATION: 96 %

## 2024-05-21 DIAGNOSIS — K57.20 DIVERTICULITIS OF LARGE INTESTINE WITH ABSCESS WITHOUT BLEEDING: Primary | ICD-10-CM

## 2024-05-21 DIAGNOSIS — K57.92 DIVERTICULITIS: ICD-10-CM

## 2024-05-21 DIAGNOSIS — Z88.9 MULTIPLE DRUG ALLERGIES: ICD-10-CM

## 2024-05-21 DIAGNOSIS — K57.92 DIVERTICULITIS OF INTESTINE, PART UNSPECIFIED, WITHOUT PERFORATION OR ABSCESS WITHOUT BLEEDING: ICD-10-CM

## 2024-05-21 LAB
ANION GAP SERPL CALC-SCNC: 14 MMOL/L (ref 10–20)
BUN SERPL-MCNC: 6 MG/DL (ref 6–23)
CALCIUM SERPL-MCNC: 9.1 MG/DL (ref 8.6–10.3)
CHLORIDE SERPL-SCNC: 106 MMOL/L (ref 98–107)
CO2 SERPL-SCNC: 23 MMOL/L (ref 21–32)
CREAT SERPL-MCNC: 0.46 MG/DL (ref 0.5–1.05)
CRP SERPL-MCNC: 0.16 MG/DL
EGFRCR SERPLBLD CKD-EPI 2021: >90 ML/MIN/1.73M*2
ERYTHROCYTE [DISTWIDTH] IN BLOOD BY AUTOMATED COUNT: 12.3 % (ref 11.5–14.5)
GLUCOSE SERPL-MCNC: 125 MG/DL (ref 74–99)
HCT VFR BLD AUTO: 36.3 % (ref 36–46)
HGB BLD-MCNC: 12.4 G/DL (ref 12–16)
MCH RBC QN AUTO: 30 PG (ref 26–34)
MCHC RBC AUTO-ENTMCNC: 34.2 G/DL (ref 32–36)
MCV RBC AUTO: 88 FL (ref 80–100)
NRBC BLD-RTO: 0 /100 WBCS (ref 0–0)
PLATELET # BLD AUTO: 413 X10*3/UL (ref 150–450)
POTASSIUM SERPL-SCNC: 3.7 MMOL/L (ref 3.5–5.3)
RBC # BLD AUTO: 4.13 X10*6/UL (ref 4–5.2)
SODIUM SERPL-SCNC: 139 MMOL/L (ref 136–145)
WBC # BLD AUTO: 7.2 X10*3/UL (ref 4.4–11.3)

## 2024-05-21 PROCEDURE — 86140 C-REACTIVE PROTEIN: CPT

## 2024-05-21 PROCEDURE — 2500000004 HC RX 250 GENERAL PHARMACY W/ HCPCS (ALT 636 FOR OP/ED): Performed by: REGISTERED NURSE

## 2024-05-21 PROCEDURE — G8419 CALC BMI OUT NRM PARAM NOF/U: HCPCS | Performed by: INTERNAL MEDICINE

## 2024-05-21 PROCEDURE — 96365 THER/PROPH/DIAG IV INF INIT: CPT | Mod: INF

## 2024-05-21 PROCEDURE — 3017F COLORECTAL CA SCREEN DOC REV: CPT | Performed by: INTERNAL MEDICINE

## 2024-05-21 PROCEDURE — 99213 OFFICE O/P EST LOW 20 MIN: CPT | Performed by: INTERNAL MEDICINE

## 2024-05-21 PROCEDURE — 80048 BASIC METABOLIC PNL TOTAL CA: CPT

## 2024-05-21 PROCEDURE — 1123F ACP DISCUSS/DSCN MKR DOCD: CPT | Performed by: INTERNAL MEDICINE

## 2024-05-21 PROCEDURE — G8400 PT W/DXA NO RESULTS DOC: HCPCS | Performed by: INTERNAL MEDICINE

## 2024-05-21 PROCEDURE — 85027 COMPLETE CBC AUTOMATED: CPT

## 2024-05-21 PROCEDURE — 4004F PT TOBACCO SCREEN RCVD TLK: CPT | Performed by: INTERNAL MEDICINE

## 2024-05-21 PROCEDURE — G8428 CUR MEDS NOT DOCUMENT: HCPCS | Performed by: INTERNAL MEDICINE

## 2024-05-21 PROCEDURE — 1090F PRES/ABSN URINE INCON ASSESS: CPT | Performed by: INTERNAL MEDICINE

## 2024-05-21 RX ORDER — ERTAPENEM 1 G/1
1 INJECTION, POWDER, LYOPHILIZED, FOR SOLUTION INTRAMUSCULAR; INTRAVENOUS ONCE
Status: COMPLETED | OUTPATIENT
Start: 2024-05-21 | End: 2024-05-21

## 2024-05-21 RX ORDER — ERTAPENEM 1 G/1
1000 INJECTION, POWDER, LYOPHILIZED, FOR SOLUTION INTRAMUSCULAR; INTRAVENOUS EVERY 24 HOURS
COMMUNITY

## 2024-05-21 RX ADMIN — ERTAPENEM SODIUM 1 G: 1 INJECTION, POWDER, LYOPHILIZED, FOR SOLUTION INTRAMUSCULAR; INTRAVENOUS at 11:54

## 2024-05-21 ASSESSMENT — PATIENT HEALTH QUESTIONNAIRE - PHQ9
SUM OF ALL RESPONSES TO PHQ QUESTIONS 1-9: 2
SUM OF ALL RESPONSES TO PHQ QUESTIONS 1-9: 2
2. FEELING DOWN, DEPRESSED OR HOPELESS: SEVERAL DAYS
SUM OF ALL RESPONSES TO PHQ QUESTIONS 1-9: 2
SUM OF ALL RESPONSES TO PHQ QUESTIONS 1-9: 2
SUM OF ALL RESPONSES TO PHQ9 QUESTIONS 1 & 2: 2
1. LITTLE INTEREST OR PLEASURE IN DOING THINGS: SEVERAL DAYS

## 2024-05-21 ASSESSMENT — PAIN SCALES - GENERAL: PAINLEVEL: 0-NO PAIN

## 2024-05-21 NOTE — PROGRESS NOTES
performed.  Omnipaque 350--75 mL was administered intravenously.    FINDINGS:   LOWER CHEST:   No cardiomegaly.  No pericardial effusion.  Lung bases are clear.     ABDOMEN:     LIVER:   No hepatomegaly.  Smooth surface contour.  Normal attenuation.     BILE DUCTS:   No intrahepatic or extrahepatic biliary ductal dilatation.     GALLBLADDER:   The gallbladder is unremarkable.   STOMACH:   No abnormalities identified.     PANCREAS:   No masses or ductal dilatation.     SPLEEN:   No splenomegaly or focal splenic lesion.     ADRENAL GLANDS:   No thickening or nodules.     KIDNEYS AND URETERS:   Kidneys are normal in size and location.  No renal or ureteral   calculi.     PELVIS:     BLADDER:   No abnormalities identified.     REPRODUCTIVE ORGANS:   No abnormalities identified.     BOWEL:   No abnormalities identified. There are multiple diverticula in the   descending and sigmoid colon, but no evidence for diverticulitis.   The appendix is identified and is unremarkable.     VESSELS:   No abnormalities identified.  Abdominal aorta is normal in caliber.     PERITONEUM/RETROPERITONEUM/LYMPH NODES:   No free fluid.  No pneumoperitoneum.   No lymphadenopathy.     ABDOMINAL WALL:   No abnormalities identified.   SOFT TISSUES:   No abnormalities identified.     BONES:   No acute fracture or aggressive osseous lesion.   IMPRESSION:   No acute pathology. There are multiple diverticula in the descending   and sigmoid colon, but no evidence for diverticulitis.                   Assessment:  Acute diverticulitis of sigmoid colon with small intramural abscess  Multiple drug allergies    Has been tolerating IV antibiotics.  Repeat CAT scan as above from 5/15 does not show any clear abscess.  Her CRP normalized    PLAN:  Completed course of antibiotics CT showed improvement and resolution of collection would stop antibiotic especially if this may be contributing to her nausea.    She is aware if she gets worsening to notify

## 2024-05-22 ENCOUNTER — INFUSION (OUTPATIENT)
Dept: HEMATOLOGY/ONCOLOGY | Facility: CLINIC | Age: 64
End: 2024-05-22
Payer: COMMERCIAL

## 2024-05-22 VITALS
RESPIRATION RATE: 20 BRPM | DIASTOLIC BLOOD PRESSURE: 64 MMHG | TEMPERATURE: 97.5 F | HEART RATE: 54 BPM | OXYGEN SATURATION: 97 % | SYSTOLIC BLOOD PRESSURE: 97 MMHG

## 2024-05-22 DIAGNOSIS — K57.92 DIVERTICULITIS: ICD-10-CM

## 2024-05-22 DIAGNOSIS — K57.80 DIVERTICULITIS OF INTESTINE WITH ABSCESS WITHOUT BLEEDING, UNSPECIFIED PART OF INTESTINAL TRACT: ICD-10-CM

## 2024-05-22 PROCEDURE — 99211 OFF/OP EST MAY X REQ PHY/QHP: CPT

## 2024-05-22 NOTE — PROGRESS NOTES
Patient tolerated removal of midline without sign of adverse reaction.  After midline removal home going instructions given and reviewed with patient.  Patient discharged in stable condition.

## 2024-05-28 ENCOUNTER — OFFICE VISIT (OUTPATIENT)
Dept: SURGERY | Facility: CLINIC | Age: 64
End: 2024-05-28
Payer: COMMERCIAL

## 2024-05-28 ENCOUNTER — LAB (OUTPATIENT)
Dept: LAB | Facility: LAB | Age: 64
End: 2024-05-28
Payer: COMMERCIAL

## 2024-05-28 VITALS — DIASTOLIC BLOOD PRESSURE: 89 MMHG | SYSTOLIC BLOOD PRESSURE: 128 MMHG | HEART RATE: 52 BPM

## 2024-05-28 DIAGNOSIS — K57.92 DIVERTICULITIS: ICD-10-CM

## 2024-05-28 DIAGNOSIS — K57.92 DIVERTICULITIS: Primary | ICD-10-CM

## 2024-05-28 LAB
ALBUMIN SERPL BCP-MCNC: 4.3 G/DL (ref 3.4–5)
ALP SERPL-CCNC: 93 U/L (ref 33–136)
ALT SERPL W P-5'-P-CCNC: 28 U/L (ref 7–45)
ANION GAP SERPL CALC-SCNC: 10 MMOL/L (ref 10–20)
AST SERPL W P-5'-P-CCNC: 21 U/L (ref 9–39)
BASOPHILS # BLD AUTO: 0.05 X10*3/UL (ref 0–0.1)
BASOPHILS NFR BLD AUTO: 0.7 %
BILIRUB SERPL-MCNC: 0.5 MG/DL (ref 0–1.2)
BUN SERPL-MCNC: 8 MG/DL (ref 6–23)
CALCIUM SERPL-MCNC: 9.4 MG/DL (ref 8.6–10.3)
CHLORIDE SERPL-SCNC: 106 MMOL/L (ref 98–107)
CO2 SERPL-SCNC: 29 MMOL/L (ref 21–32)
CREAT SERPL-MCNC: 0.48 MG/DL (ref 0.5–1.05)
EGFRCR SERPLBLD CKD-EPI 2021: >90 ML/MIN/1.73M*2
EOSINOPHIL # BLD AUTO: 0.13 X10*3/UL (ref 0–0.7)
EOSINOPHIL NFR BLD AUTO: 1.9 %
ERYTHROCYTE [DISTWIDTH] IN BLOOD BY AUTOMATED COUNT: 12.8 % (ref 11.5–14.5)
GLUCOSE SERPL-MCNC: 88 MG/DL (ref 74–99)
HCT VFR BLD AUTO: 37.4 % (ref 36–46)
HGB BLD-MCNC: 12.3 G/DL (ref 12–16)
IMM GRANULOCYTES # BLD AUTO: 0.02 X10*3/UL (ref 0–0.7)
IMM GRANULOCYTES NFR BLD AUTO: 0.3 % (ref 0–0.9)
LYMPHOCYTES # BLD AUTO: 1.68 X10*3/UL (ref 1.2–4.8)
LYMPHOCYTES NFR BLD AUTO: 24.1 %
MAGNESIUM SERPL-MCNC: 2.07 MG/DL (ref 1.6–2.4)
MCH RBC QN AUTO: 30.1 PG (ref 26–34)
MCHC RBC AUTO-ENTMCNC: 32.9 G/DL (ref 32–36)
MCV RBC AUTO: 91 FL (ref 80–100)
MONOCYTES # BLD AUTO: 0.5 X10*3/UL (ref 0.1–1)
MONOCYTES NFR BLD AUTO: 7.2 %
NEUTROPHILS # BLD AUTO: 4.59 X10*3/UL (ref 1.2–7.7)
NEUTROPHILS NFR BLD AUTO: 65.8 %
NRBC BLD-RTO: 0 /100 WBCS (ref 0–0)
PHOSPHATE SERPL-MCNC: 3.5 MG/DL (ref 2.5–4.9)
PLATELET # BLD AUTO: 313 X10*3/UL (ref 150–450)
POTASSIUM SERPL-SCNC: 4.1 MMOL/L (ref 3.5–5.3)
PROT SERPL-MCNC: 6.9 G/DL (ref 6.4–8.2)
RBC # BLD AUTO: 4.09 X10*6/UL (ref 4–5.2)
SODIUM SERPL-SCNC: 141 MMOL/L (ref 136–145)
WBC # BLD AUTO: 7 X10*3/UL (ref 4.4–11.3)

## 2024-05-28 PROCEDURE — 84100 ASSAY OF PHOSPHORUS: CPT

## 2024-05-28 PROCEDURE — 1036F TOBACCO NON-USER: CPT | Performed by: SURGERY

## 2024-05-28 PROCEDURE — 99213 OFFICE O/P EST LOW 20 MIN: CPT | Performed by: SURGERY

## 2024-05-28 PROCEDURE — 36415 COLL VENOUS BLD VENIPUNCTURE: CPT

## 2024-05-28 PROCEDURE — 1111F DSCHRG MED/CURRENT MED MERGE: CPT | Performed by: SURGERY

## 2024-05-28 PROCEDURE — 85025 COMPLETE CBC W/AUTO DIFF WBC: CPT

## 2024-05-28 PROCEDURE — 1160F RVW MEDS BY RX/DR IN RCRD: CPT | Performed by: SURGERY

## 2024-05-28 PROCEDURE — 80053 COMPREHEN METABOLIC PANEL: CPT

## 2024-05-28 PROCEDURE — 1159F MED LIST DOCD IN RCRD: CPT | Performed by: SURGERY

## 2024-05-28 PROCEDURE — 83735 ASSAY OF MAGNESIUM: CPT

## 2024-05-28 NOTE — PROGRESS NOTES
Subjective   Patient ID: Sarah Walker is a 64 y.o. female who presents for No chief complaint on file..  HPI  This patient 64-year-old female with multiple medical comorbidities including diverticular disease who presented to their medical hospital in early May with a small 1.4 cm intramural abscess.  She was treated with antibiotics and was discharged home.  She pleated her antibiotic regimen and this repeat CT from May 15 does not show any residual disease. Since that time she is slowly feeling better but still has some lower abdominal pain, kathrine. With defecation.  She states her stools are formed but soft.  She presents today for routine follow up.  Review of Systems    Objective   Physical Exam  Gen: NAD, AAx3  Abd; Soft, ND, minimally TTP in lower abdomen  Assessment/Plan   64-year-old female with history of diverticular disease who presented in early May with a 1.4 cm intramural abscess, appears to be of resolved based off of imaging.  Still having some lower abdominal pain, kathrine. With defecation, but improving.  Stated to patient unclear why she is having this pain.  Given patient has had a repeat CT scan that is negative and she is feeling better, I would rather start off getting labwork to make sure it is negative, and if anything is abnormal then proceed with imaging  -CBC, CMP, MG Phos  -If positive, will repeat imaging, if negative, will ask patient to give symptoms 1-2 months to resolve  -No need for stool softeners  -Per last colonoscopy note in 2020, recommended repeat colonoscopy in 2025.  I recommend the patient does get this done next year but no indication that needs to be done sooner         Mitchell Walton MD 05/28/24 10:30 AM

## 2024-05-29 ENCOUNTER — APPOINTMENT (OUTPATIENT)
Dept: PRIMARY CARE | Facility: CLINIC | Age: 64
End: 2024-05-29
Payer: COMMERCIAL

## 2024-06-01 LAB — URTICARIA-INDUCING ACTIVITY: NORMAL INDEX UNIT

## 2024-06-12 ENCOUNTER — APPOINTMENT (OUTPATIENT)
Dept: ALLERGY | Facility: HOSPITAL | Age: 64
End: 2024-06-12
Payer: COMMERCIAL

## 2024-06-13 ENCOUNTER — APPOINTMENT (OUTPATIENT)
Dept: PRIMARY CARE | Facility: CLINIC | Age: 64
End: 2024-06-13
Payer: COMMERCIAL

## 2024-06-24 ENCOUNTER — HOSPITAL ENCOUNTER (EMERGENCY)
Facility: HOSPITAL | Age: 64
Discharge: HOME | End: 2024-06-24
Payer: MEDICARE

## 2024-06-24 VITALS
DIASTOLIC BLOOD PRESSURE: 84 MMHG | HEART RATE: 67 BPM | OXYGEN SATURATION: 97 % | RESPIRATION RATE: 16 BRPM | WEIGHT: 143 LBS | SYSTOLIC BLOOD PRESSURE: 155 MMHG | HEIGHT: 63 IN | TEMPERATURE: 97.3 F | BODY MASS INDEX: 25.34 KG/M2
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
## 2024-06-24 DIAGNOSIS — S46.812A TRAPEZIUS STRAIN, LEFT, INITIAL ENCOUNTER: ICD-10-CM

## 2024-06-24 PROCEDURE — 99283 EMERGENCY DEPT VISIT LOW MDM: CPT

## 2024-06-24 PROCEDURE — 2500000004 HC RX 250 GENERAL PHARMACY W/ HCPCS (ALT 636 FOR OP/ED): Performed by: PHYSICIAN ASSISTANT

## 2024-06-24 PROCEDURE — 96372 THER/PROPH/DIAG INJ SC/IM: CPT | Performed by: PHYSICIAN ASSISTANT

## 2024-06-24 RX ORDER — ORPHENADRINE CITRATE 30 MG/ML
60 INJECTION INTRAMUSCULAR; INTRAVENOUS ONCE
Status: COMPLETED | OUTPATIENT
Start: 2024-06-24 | End: 2024-06-24

## 2024-06-24 RX ORDER — KETOROLAC TROMETHAMINE 30 MG/ML
15 INJECTION, SOLUTION INTRAMUSCULAR; INTRAVENOUS ONCE
Status: COMPLETED | OUTPATIENT
Start: 2024-06-24 | End: 2024-06-24

## 2024-06-24 RX ORDER — METHOCARBAMOL 500 MG/1
750 TABLET, FILM COATED ORAL 3 TIMES DAILY
Qty: 23 TABLET | Refills: 0 | Status: SHIPPED | OUTPATIENT
Start: 2024-06-24 | End: 2024-06-29

## 2024-06-24 ASSESSMENT — PAIN - FUNCTIONAL ASSESSMENT: PAIN_FUNCTIONAL_ASSESSMENT: 0-10

## 2024-06-24 ASSESSMENT — LIFESTYLE VARIABLES
EVER HAD A DRINK FIRST THING IN THE MORNING TO STEADY YOUR NERVES TO GET RID OF A HANGOVER: NO
HAVE YOU EVER FELT YOU SHOULD CUT DOWN ON YOUR DRINKING: NO
TOTAL SCORE: 0
HAVE PEOPLE ANNOYED YOU BY CRITICIZING YOUR DRINKING: NO
EVER FELT BAD OR GUILTY ABOUT YOUR DRINKING: NO

## 2024-06-24 ASSESSMENT — PAIN SCALES - GENERAL: PAINLEVEL_OUTOF10: 9
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
## 2024-06-27 ENCOUNTER — APPOINTMENT (OUTPATIENT)
Dept: PRIMARY CARE | Facility: CLINIC | Age: 64
End: 2024-06-27
Payer: COMMERCIAL

## 2024-07-12 ENCOUNTER — HOSPITAL ENCOUNTER (EMERGENCY)
Facility: HOSPITAL | Age: 64
Discharge: HOME | End: 2024-07-12
Payer: COMMERCIAL

## 2024-07-12 ENCOUNTER — APPOINTMENT (OUTPATIENT)
Dept: RADIOLOGY | Facility: HOSPITAL | Age: 64
End: 2024-07-12
Payer: COMMERCIAL

## 2024-07-12 ENCOUNTER — APPOINTMENT (OUTPATIENT)
Dept: CARDIOLOGY | Facility: HOSPITAL | Age: 64
End: 2024-07-12
Payer: COMMERCIAL

## 2024-07-12 VITALS
HEART RATE: 55 BPM | RESPIRATION RATE: 18 BRPM | BODY MASS INDEX: 25.78 KG/M2 | DIASTOLIC BLOOD PRESSURE: 75 MMHG | HEIGHT: 63 IN | TEMPERATURE: 96.8 F | SYSTOLIC BLOOD PRESSURE: 134 MMHG | WEIGHT: 145.5 LBS | OXYGEN SATURATION: 99 %

## 2024-07-12 DIAGNOSIS — R11.0 NAUSEA: ICD-10-CM

## 2024-07-12 DIAGNOSIS — R10.84 ABDOMINAL PAIN, GENERALIZED: Primary | ICD-10-CM

## 2024-07-12 LAB
ALBUMIN SERPL BCP-MCNC: 4.7 G/DL (ref 3.4–5)
ALP SERPL-CCNC: 89 U/L (ref 33–136)
ALT SERPL W P-5'-P-CCNC: 21 U/L (ref 7–45)
ANION GAP SERPL CALC-SCNC: 11 MMOL/L (ref 10–20)
APPEARANCE UR: CLEAR
APTT PPP: 31 SECONDS (ref 27–38)
AST SERPL W P-5'-P-CCNC: 19 U/L (ref 9–39)
BASOPHILS # BLD AUTO: 0.04 X10*3/UL (ref 0–0.1)
BASOPHILS NFR BLD AUTO: 0.5 %
BILIRUB SERPL-MCNC: 0.6 MG/DL (ref 0–1.2)
BILIRUB UR STRIP.AUTO-MCNC: NEGATIVE MG/DL
BUN SERPL-MCNC: 4 MG/DL (ref 6–23)
CALCIUM SERPL-MCNC: 9.6 MG/DL (ref 8.6–10.3)
CARDIAC TROPONIN I PNL SERPL HS: 3 NG/L (ref 0–13)
CHLORIDE SERPL-SCNC: 105 MMOL/L (ref 98–107)
CO2 SERPL-SCNC: 27 MMOL/L (ref 21–32)
COLOR UR: NORMAL
CREAT SERPL-MCNC: 0.45 MG/DL (ref 0.5–1.05)
D DIMER PPP FEU-MCNC: 644 NG/ML FEU
EGFRCR SERPLBLD CKD-EPI 2021: >90 ML/MIN/1.73M*2
EOSINOPHIL # BLD AUTO: 0.11 X10*3/UL (ref 0–0.7)
EOSINOPHIL NFR BLD AUTO: 1.4 %
ERYTHROCYTE [DISTWIDTH] IN BLOOD BY AUTOMATED COUNT: 12.5 % (ref 11.5–14.5)
GLUCOSE SERPL-MCNC: 87 MG/DL (ref 74–99)
GLUCOSE UR STRIP.AUTO-MCNC: NORMAL MG/DL
HCT VFR BLD AUTO: 40.8 % (ref 36–46)
HGB BLD-MCNC: 14.1 G/DL (ref 12–16)
IMM GRANULOCYTES # BLD AUTO: 0.01 X10*3/UL (ref 0–0.7)
IMM GRANULOCYTES NFR BLD AUTO: 0.1 % (ref 0–0.9)
INR PPP: 1.1 (ref 0.9–1.1)
KETONES UR STRIP.AUTO-MCNC: NEGATIVE MG/DL
LACTATE SERPL-SCNC: 0.6 MMOL/L (ref 0.4–2)
LEUKOCYTE ESTERASE UR QL STRIP.AUTO: NEGATIVE
LIPASE SERPL-CCNC: 14 U/L (ref 9–82)
LYMPHOCYTES # BLD AUTO: 2.15 X10*3/UL (ref 1.2–4.8)
LYMPHOCYTES NFR BLD AUTO: 28.3 %
MCH RBC QN AUTO: 30.4 PG (ref 26–34)
MCHC RBC AUTO-ENTMCNC: 34.6 G/DL (ref 32–36)
MCV RBC AUTO: 88 FL (ref 80–100)
MONOCYTES # BLD AUTO: 0.66 X10*3/UL (ref 0.1–1)
MONOCYTES NFR BLD AUTO: 8.7 %
NEUTROPHILS # BLD AUTO: 4.64 X10*3/UL (ref 1.2–7.7)
NEUTROPHILS NFR BLD AUTO: 61 %
NITRITE UR QL STRIP.AUTO: NEGATIVE
NRBC BLD-RTO: 0 /100 WBCS (ref 0–0)
PH UR STRIP.AUTO: 6.5 [PH]
PLATELET # BLD AUTO: 295 X10*3/UL (ref 150–450)
POTASSIUM SERPL-SCNC: 3.6 MMOL/L (ref 3.5–5.3)
PROT SERPL-MCNC: 7.7 G/DL (ref 6.4–8.2)
PROT UR STRIP.AUTO-MCNC: NEGATIVE MG/DL
PROTHROMBIN TIME: 12 SECONDS (ref 9.8–12.8)
RBC # BLD AUTO: 4.64 X10*6/UL (ref 4–5.2)
RBC # UR STRIP.AUTO: NEGATIVE /UL
SODIUM SERPL-SCNC: 139 MMOL/L (ref 136–145)
SP GR UR STRIP.AUTO: 1.01
UROBILINOGEN UR STRIP.AUTO-MCNC: NORMAL MG/DL
WBC # BLD AUTO: 7.6 X10*3/UL (ref 4.4–11.3)

## 2024-07-12 PROCEDURE — 96375 TX/PRO/DX INJ NEW DRUG ADDON: CPT | Mod: 59

## 2024-07-12 PROCEDURE — 71275 CT ANGIOGRAPHY CHEST: CPT

## 2024-07-12 PROCEDURE — 96374 THER/PROPH/DIAG INJ IV PUSH: CPT | Mod: 59

## 2024-07-12 PROCEDURE — 96361 HYDRATE IV INFUSION ADD-ON: CPT

## 2024-07-12 PROCEDURE — 83690 ASSAY OF LIPASE: CPT

## 2024-07-12 PROCEDURE — 85730 THROMBOPLASTIN TIME PARTIAL: CPT

## 2024-07-12 PROCEDURE — 36415 COLL VENOUS BLD VENIPUNCTURE: CPT

## 2024-07-12 PROCEDURE — 83605 ASSAY OF LACTIC ACID: CPT

## 2024-07-12 PROCEDURE — 74177 CT ABD & PELVIS W/CONTRAST: CPT

## 2024-07-12 PROCEDURE — 85379 FIBRIN DEGRADATION QUANT: CPT

## 2024-07-12 PROCEDURE — 80053 COMPREHEN METABOLIC PANEL: CPT

## 2024-07-12 PROCEDURE — 74177 CT ABD & PELVIS W/CONTRAST: CPT | Performed by: RADIOLOGY

## 2024-07-12 PROCEDURE — 2550000001 HC RX 255 CONTRASTS

## 2024-07-12 PROCEDURE — 81003 URINALYSIS AUTO W/O SCOPE: CPT

## 2024-07-12 PROCEDURE — 2500000004 HC RX 250 GENERAL PHARMACY W/ HCPCS (ALT 636 FOR OP/ED)

## 2024-07-12 PROCEDURE — 71275 CT ANGIOGRAPHY CHEST: CPT | Performed by: RADIOLOGY

## 2024-07-12 PROCEDURE — 99285 EMERGENCY DEPT VISIT HI MDM: CPT | Mod: 25

## 2024-07-12 PROCEDURE — 93005 ELECTROCARDIOGRAM TRACING: CPT

## 2024-07-12 PROCEDURE — 71045 X-RAY EXAM CHEST 1 VIEW: CPT

## 2024-07-12 PROCEDURE — 85025 COMPLETE CBC W/AUTO DIFF WBC: CPT

## 2024-07-12 PROCEDURE — 84484 ASSAY OF TROPONIN QUANT: CPT

## 2024-07-12 PROCEDURE — 71045 X-RAY EXAM CHEST 1 VIEW: CPT | Performed by: RADIOLOGY

## 2024-07-12 RX ORDER — ONDANSETRON HYDROCHLORIDE 2 MG/ML
4 INJECTION, SOLUTION INTRAVENOUS ONCE
Status: COMPLETED | OUTPATIENT
Start: 2024-07-12 | End: 2024-07-12

## 2024-07-12 RX ORDER — ONDANSETRON 4 MG/1
4 TABLET, ORALLY DISINTEGRATING ORAL EVERY 8 HOURS PRN
Qty: 20 TABLET | Refills: 0 | Status: SHIPPED | OUTPATIENT
Start: 2024-07-12 | End: 2024-07-19

## 2024-07-12 RX ORDER — MORPHINE SULFATE 4 MG/ML
4 INJECTION, SOLUTION INTRAMUSCULAR; INTRAVENOUS ONCE
Status: COMPLETED | OUTPATIENT
Start: 2024-07-12 | End: 2024-07-12

## 2024-07-12 RX ORDER — DICYCLOMINE HYDROCHLORIDE 20 MG/1
20 TABLET ORAL 2 TIMES DAILY PRN
Qty: 14 TABLET | Refills: 0 | Status: SHIPPED | OUTPATIENT
Start: 2024-07-12 | End: 2024-07-19

## 2024-07-12 RX ORDER — ACETAMINOPHEN 500 MG
1000 TABLET ORAL EVERY 8 HOURS PRN
Qty: 30 TABLET | Refills: 0 | Status: SHIPPED | OUTPATIENT
Start: 2024-07-12 | End: 2024-08-02

## 2024-07-12 ASSESSMENT — PAIN SCALES - GENERAL
PAINLEVEL_OUTOF10: 6
PAINLEVEL_OUTOF10: 10 - WORST POSSIBLE PAIN
PAINLEVEL_OUTOF10: 10 - WORST POSSIBLE PAIN

## 2024-07-12 ASSESSMENT — LIFESTYLE VARIABLES
EVER HAD A DRINK FIRST THING IN THE MORNING TO STEADY YOUR NERVES TO GET RID OF A HANGOVER: NO
HAVE PEOPLE ANNOYED YOU BY CRITICIZING YOUR DRINKING: NO
EVER FELT BAD OR GUILTY ABOUT YOUR DRINKING: NO
HAVE YOU EVER FELT YOU SHOULD CUT DOWN ON YOUR DRINKING: NO
TOTAL SCORE: 0

## 2024-07-12 ASSESSMENT — PAIN - FUNCTIONAL ASSESSMENT
PAIN_FUNCTIONAL_ASSESSMENT: 0-10
PAIN_FUNCTIONAL_ASSESSMENT: 0-10

## 2024-07-12 ASSESSMENT — PAIN DESCRIPTION - DESCRIPTORS: DESCRIPTORS: CRAMPING

## 2024-07-12 ASSESSMENT — PAIN DESCRIPTION - ORIENTATION: ORIENTATION: INNER

## 2024-07-12 ASSESSMENT — PAIN DESCRIPTION - PROGRESSION: CLINICAL_PROGRESSION: GRADUALLY IMPROVING

## 2024-07-12 ASSESSMENT — PAIN DESCRIPTION - LOCATION: LOCATION: ABDOMEN

## 2024-07-12 ASSESSMENT — PAIN DESCRIPTION - PAIN TYPE: TYPE: ACUTE PAIN

## 2024-07-12 NOTE — ED PROVIDER NOTES
"HPI   Chief Complaint   Patient presents with    Abdominal Pain     \"Here for abdominal pain that has atrted over the last 4-5 days with nausea and unable to eat.\"       History provided by: Patient    Limitations to history: None    CC: Abdominal pain    HPI: 64-year-old female presents emergency department to be evaluated for abdominal pain.  Patient states that this abdominal pain has been present for the last 4 to 5 days.  She characterized the pain as \"sharp \"and localized to the left side of her abdomen.  She says the pain will radiate to her left flank area.  She denies taking anything for pain prior to arrival.  She states that is similar to the last time that she had diverticulitis.  She reports nausea but no vomiting.  Reports chills but no fever.  Denies cough, runny nose, congestion.  Denies headache and vision changes.  Denies neck pain or back pain.  She does report occasional dyspnea but believes this is more from the pain than anything else.  She denies chest pain.  She denies history of ACS she is not sure when her last stress test was but she never required stent placement or bypass.  Denies history of heart failure denies pain or swelling in extremities with use of diuretics but denies history of DVTs or PEs and denies recent plane flights, recent surgeries, history of cancer, use of OCPs.  Reports urinary frequency but denies dysuria, urgency, and hematuria.  Denies blood in the stool.  Denies pelvic pain, vaginal bleeding or discharge.  Denies all other systemic symptoms.    ROS: Negative unless mentioned in HPI    Social Hx: Former smoker.  Denies alcohol or drug use.    Medical Hx: Allergy to Cipro, Flagyl, penicillin, sulfa drugs, atorvastatin    Physical exam:    Constitutional: Patient is well-nourished and well-developed.  Sitting comfortably in the room and in no distress.  Oriented to person, place, time, and situation.    HEENT: Head is normocephalic, atraumatic. Patient's airway is " patent.  Tympanic membranes are clear bilaterally.  Nasal mucosa clear.  Mouth with normal mucosa.  Throat is not erythematous and there are no oropharyngeal exudates, uvula is midline.  No obvious facial deformities.    Eyes: Clear bilaterally.  Pupils are equal round and reactive to light and accommodation.  Extraocular movements intact.      Cardiac: Regular rate, regular rhythm.  Heart sounds S1, S2.  No murmurs, rubs, or gallops.  PMI nondisplaced.  No JVD.    Respiratory: Regular respiratory rate and effort.  Breath sounds are clear and equal bilaterally, no adventitious lung sounds.  Patient is speaking in full sentences and is in no apparent respiratory distress. No use of accessory muscles.      Gastrointestinal: Left lower quadrant and left upper quadrant tenderness palpation.  Soft and nondistended.  There are no obvious deformities.  No rebound tenderness or guarding.  Bowel sounds are normal active.    Genitourinary: Left CVA tenderness    Musculoskeletal: No reproducible tenderness.  No obvious skin or bony deformities.  Patient has equal range of motion in all extremities and no strength deficiencies.  No muscle or joint tenderness. No back or neck tenderness.  Capillary refill less than 3 seconds.  Strong peripheral pulses.  No sensory deficits.    Neurological: Patient is alert and oriented.  No focal deficits.  5/5 strength in all extremities.  Cranial nerves II through XII intact. GCS15.     Skin: Skin is normal color for race and is warm, dry, and intact.  No evidence of trauma.  No lesions, rashes, bruising, jaundice, or masses.    Psych: Appropriate mood and affect.  No apparent risk to self or others.    Heme/lymph: No adenopathy, lymphadenopathy, or splenomegaly    Physical exam is otherwise negative unless stated above or in history of present illness.                          [place] Coma Scale Score: 15                     Patient History   Past Medical History:   Diagnosis Date     Diverticulitis     Personal history of other diseases of the musculoskeletal system and connective tissue 11/06/2014    History of osteoarthritis    Personal history of other endocrine, nutritional and metabolic disease     History of hyperlipidemia    Personal history of other endocrine, nutritional and metabolic disease 11/06/2014    History of hypothyroidism     Past Surgical History:   Procedure Laterality Date    BACK SURGERY  08/31/2017    Back Surgery    COLONOSCOPY W/ POLYPECTOMY      DILATION AND CURETTAGE OF UTERUS  11/06/2014    Dilation And Curettage    EXPLORATORY LAPAROTOMY      HYSTERECTOMY  10/30/2017    Hysterectomy     Family History   Problem Relation Name Age of Onset    Liver cancer Father      Depression Sister      Arthritis Sister      Asthma Brother      Depression Daughter       Social History     Tobacco Use    Smoking status: Former     Types: Cigarettes    Smokeless tobacco: Never   Vaping Use    Vaping status: Never Used   Substance Use Topics    Alcohol use: Yes    Drug use: Never       Physical Exam   ED Triage Vitals [07/12/24 1550]   Temperature Heart Rate Respirations BP   36 °C (96.8 °F) 54 16 137/75      Pulse Ox Temp Source Heart Rate Source Patient Position   97 % Temporal Monitor Sitting      BP Location FiO2 (%)     Right arm --       Physical Exam    ED Course & MDM   ED Course as of 07/12/24 2003 Fri Jul 12, 2024 1948 CT abdomen pelvis w IV contrast [[place]]      ED Course User Index  [[place]] Cipriano Hunt PA-C         Diagnoses as of 07/12/24 2003   Abdominal pain, generalized   Nausea     Patient updated on plan for lab testing, IV insertion, radiology imaging, and medications to be administered while in the ER (if indicated). Patient updated on expected wait times for testing and results. Patient provided my name and told to ask any staff member for questions or concerns if they should arise. Electronic medical record reviewed.     MDM    Upon initial assessment, patient  was healthy non-toxic appearing and in no apparent distress.     Patient presented to the emergency department with the chief complaint left-sided abdominal pain with nausea and shortness of breath.  Breath sounds are clear and equal bilaterally, 97% on room air.  Muffled heart sounds, JVD, murmur.  No peripheral edema or erythema.  Patient is left lower quadrant left upper quadrant tenderness palpation however abdomen is soft and nondistended.  On arrival to the emergency department, vital signs were within normal limits    Will give the patient IV normal saline as well as morphine and Zofran for her pain and nausea.  Get basic blood work, EKG and troponin and D-dimer given her flank pain and age, urinalysis, lactate and urinalysis, chest x-ray, CT abdomen and pelvis.    Patient's EKG is performed at 1647 turbid by me.  Sinus bradycardia with sinus arrhythmia 56 beats minute.  No [place] interval changes of just AV block.  No ST elevation or depression.  No prolonged QT.  Nonspecific T wave inversion in lead V1.    Patient's original troponin is 3.  Lactate is 0.6.  Urinalysis reveals no urinary tract infection.  Lipase is 14.  D-dimer is 644 so ordered CTA.  CBC is in the leukocytosis or anemia.  CMP reveals no acute abnormalities.  Chest x-ray reveals no acute cardiopulmonary process including pneumonia.    Patient reports feeling better and is able to tolerate p.o. intake after the medications.  Her CT scans revealed no acute abnormality including pneumonia, pulmonary embolism, kidney stone or kidney infection, or diverticulitis.  She does have baseline diverticulosis.  Patient is feeling well and would like to be discharged.  She will be discharged with Bentyl as needed, Tylenol, and Zofran.  I discussed drinking plenty of fluids and the brat diet.  I implored her to return if her symptoms do not improve or if they worsen.  All questions and concerns addressed.  Reasons to return to ER discussed.  Patient verbalized  understanding and agreement with the treatment plan and they remained hemodynamically stable in the ER.    This note was dictated using a speech recognition program.  While an attempt was made at proof-reading to minimize errors, minor errors in transcription may be present    Medical Decision Making      Procedure  Procedures     Cipriano Hunt PA-C  07/12/24 2003

## 2024-07-13 LAB — HOLD SPECIMEN: NORMAL

## 2024-07-14 LAB
ATRIAL RATE: 56 BPM
P AXIS: 66 DEGREES
P OFFSET: 209 MS
P ONSET: 160 MS
PR INTERVAL: 130 MS
Q ONSET: 225 MS
QRS COUNT: 9 BEATS
QRS DURATION: 70 MS
QT INTERVAL: 446 MS
QTC CALCULATION(BAZETT): 430 MS
QTC FREDERICIA: 436 MS
R AXIS: 49 DEGREES
T AXIS: 63 DEGREES
T OFFSET: 448 MS
VENTRICULAR RATE: 56 BPM

## 2024-07-18 ENCOUNTER — APPOINTMENT (OUTPATIENT)
Dept: PRIMARY CARE | Facility: CLINIC | Age: 64
End: 2024-07-18
Payer: COMMERCIAL

## 2024-07-19 ENCOUNTER — HOSPITAL ENCOUNTER (EMERGENCY)
Age: 64
Discharge: HOME | End: 2024-07-19
Attending: STUDENT IN AN ORGANIZED HEALTH CARE EDUCATION/TRAINING PROGRAM
Payer: COMMERCIAL

## 2024-07-19 VITALS
HEIGHT: 63 IN | DIASTOLIC BLOOD PRESSURE: 77 MMHG | HEART RATE: 62 BPM | OXYGEN SATURATION: 98 % | WEIGHT: 145 LBS | BODY MASS INDEX: 25.69 KG/M2 | SYSTOLIC BLOOD PRESSURE: 140 MMHG | TEMPERATURE: 98.2 F | RESPIRATION RATE: 16 BRPM

## 2024-07-19 DIAGNOSIS — R10.32 ABDOMINAL PAIN, LEFT LOWER QUADRANT: Primary | ICD-10-CM

## 2024-07-19 DIAGNOSIS — R10.9 ABDOMINAL CRAMPING: ICD-10-CM

## 2024-07-19 LAB
ALBUMIN SERPL-MCNC: 4.3 G/DL (ref 3.5–4.6)
ALP SERPL-CCNC: 122 U/L (ref 40–130)
ALT SERPL-CCNC: 19 U/L (ref 0–33)
ANION GAP SERPL CALCULATED.3IONS-SCNC: 12 MEQ/L (ref 9–15)
AST SERPL-CCNC: 20 U/L (ref 0–35)
BASOPHILS # BLD: 0.1 K/UL (ref 0–0.2)
BASOPHILS NFR BLD: 0.8 %
BILIRUB SERPL-MCNC: 0.3 MG/DL (ref 0.2–0.7)
BILIRUB UR QL STRIP: NEGATIVE
BUN SERPL-MCNC: 4 MG/DL (ref 8–23)
CALCIUM SERPL-MCNC: 9.3 MG/DL (ref 8.5–9.9)
CHLORIDE SERPL-SCNC: 104 MEQ/L (ref 95–107)
CLARITY UR: CLEAR
CO2 SERPL-SCNC: 25 MEQ/L (ref 20–31)
COLOR UR: YELLOW
CREAT SERPL-MCNC: 0.4 MG/DL (ref 0.5–0.9)
EOSINOPHIL # BLD: 0.1 K/UL (ref 0–0.7)
EOSINOPHIL NFR BLD: 2 %
ERYTHROCYTE [DISTWIDTH] IN BLOOD BY AUTOMATED COUNT: 12.6 % (ref 11.5–14.5)
GLOBULIN SER CALC-MCNC: 3.1 G/DL (ref 2.3–3.5)
GLUCOSE SERPL-MCNC: 90 MG/DL (ref 70–99)
GLUCOSE UR STRIP-MCNC: NEGATIVE MG/DL
HCT VFR BLD AUTO: 36.3 % (ref 37–47)
HGB BLD-MCNC: 12.7 G/DL (ref 12–16)
HGB UR QL STRIP: NEGATIVE
KETONES UR STRIP-MCNC: NEGATIVE MG/DL
LACTATE BLDV-SCNC: 1.1 MMOL/L (ref 0.5–2.2)
LEUKOCYTE ESTERASE UR QL STRIP: NEGATIVE
LYMPHOCYTES # BLD: 1.7 K/UL (ref 1–4.8)
LYMPHOCYTES NFR BLD: 24.8 %
MCH RBC QN AUTO: 30.5 PG (ref 27–31.3)
MCHC RBC AUTO-ENTMCNC: 35 % (ref 33–37)
MCV RBC AUTO: 87.3 FL (ref 79.4–94.8)
MONOCYTES # BLD: 0.6 K/UL (ref 0.2–0.8)
MONOCYTES NFR BLD: 9.6 %
NEUTROPHILS # BLD: 4.2 K/UL (ref 1.4–6.5)
NEUTS SEG NFR BLD: 62.6 %
NITRITE UR QL STRIP: NEGATIVE
PH UR STRIP: 7 [PH] (ref 5–9)
PLATELET # BLD AUTO: 288 K/UL (ref 130–400)
POTASSIUM SERPL-SCNC: 4.1 MEQ/L (ref 3.4–4.9)
PROT SERPL-MCNC: 7.4 G/DL (ref 6.3–8)
PROT UR STRIP-MCNC: NEGATIVE MG/DL
RBC # BLD AUTO: 4.16 M/UL (ref 4.2–5.4)
SODIUM SERPL-SCNC: 141 MEQ/L (ref 135–144)
SP GR UR STRIP: 1 (ref 1–1.03)
URINE REFLEX TO CULTURE: NORMAL
UROBILINOGEN UR STRIP-ACNC: 0.2 E.U./DL
WBC # BLD AUTO: 6.7 K/UL (ref 4.8–10.8)

## 2024-07-19 PROCEDURE — 80053 COMPREHEN METABOLIC PANEL: CPT

## 2024-07-19 PROCEDURE — 99284 EMERGENCY DEPT VISIT MOD MDM: CPT

## 2024-07-19 PROCEDURE — 6360000002 HC RX W HCPCS: Performed by: STUDENT IN AN ORGANIZED HEALTH CARE EDUCATION/TRAINING PROGRAM

## 2024-07-19 PROCEDURE — 36415 COLL VENOUS BLD VENIPUNCTURE: CPT

## 2024-07-19 PROCEDURE — 83605 ASSAY OF LACTIC ACID: CPT

## 2024-07-19 PROCEDURE — 85025 COMPLETE CBC W/AUTO DIFF WBC: CPT

## 2024-07-19 PROCEDURE — 81003 URINALYSIS AUTO W/O SCOPE: CPT

## 2024-07-19 PROCEDURE — 96372 THER/PROPH/DIAG INJ SC/IM: CPT

## 2024-07-19 RX ORDER — DICYCLOMINE HCL 20 MG
20 TABLET ORAL 4 TIMES DAILY
Qty: 28 TABLET | Refills: 0 | Status: SHIPPED | OUTPATIENT
Start: 2024-07-19

## 2024-07-19 RX ORDER — DICYCLOMINE HYDROCHLORIDE 10 MG/ML
20 INJECTION INTRAMUSCULAR ONCE
Status: COMPLETED | OUTPATIENT
Start: 2024-07-19 | End: 2024-07-19

## 2024-07-19 RX ORDER — KETOROLAC TROMETHAMINE 30 MG/ML
30 INJECTION, SOLUTION INTRAMUSCULAR; INTRAVENOUS ONCE
Status: COMPLETED | OUTPATIENT
Start: 2024-07-19 | End: 2024-07-19

## 2024-07-19 RX ADMIN — KETOROLAC TROMETHAMINE 30 MG: 30 INJECTION, SOLUTION INTRAMUSCULAR at 18:04

## 2024-07-19 RX ADMIN — DICYCLOMINE HYDROCHLORIDE 20 MG: 10 INJECTION, SOLUTION INTRAMUSCULAR at 16:05

## 2024-07-19 ASSESSMENT — PAIN SCALES - GENERAL
PAINLEVEL_OUTOF10: 10
PAINLEVEL_OUTOF10: 10
PAINLEVEL_OUTOF10: 9

## 2024-07-19 ASSESSMENT — PAIN DESCRIPTION - ONSET: ONSET: ON-GOING

## 2024-07-19 ASSESSMENT — PAIN DESCRIPTION - PAIN TYPE: TYPE: ACUTE PAIN

## 2024-07-19 ASSESSMENT — PAIN - FUNCTIONAL ASSESSMENT: PAIN_FUNCTIONAL_ASSESSMENT: 0-10

## 2024-07-19 ASSESSMENT — PAIN DESCRIPTION - FREQUENCY: FREQUENCY: CONTINUOUS

## 2024-07-19 ASSESSMENT — PAIN DESCRIPTION - LOCATION: LOCATION: ABDOMEN

## 2024-07-19 ASSESSMENT — LIFESTYLE VARIABLES
HOW MANY STANDARD DRINKS CONTAINING ALCOHOL DO YOU HAVE ON A TYPICAL DAY: PATIENT DOES NOT DRINK
HOW OFTEN DO YOU HAVE A DRINK CONTAINING ALCOHOL: 2-4 TIMES A MONTH

## 2024-07-19 NOTE — DISCHARGE INSTRUCTIONS
Use the Bentyl as needed for abdominal pain and cramping    Avoid eating any spicy food, milk type products or drinks that have caffeine in it.  Take all medications as prescribed.  For pain use ibuprofen (Motrin) or acetaminophen (Tylenol), unless prescribed medications that have acetaminophen in it.  You can take over the counter acetaminophen tablets (1 - 2 tablets of the 500-mg strength every 6 hours) or ibuprofen tablets (2 tablets every 4 hours).    PLEASE RETURN TO THE EMERGENCY DEPARTMENT IMMEDIATELY for worsening symptoms, or if you develop any concerning symptoms such as: high fever not relieved by acetaminophen (Tylenol) and/or ibuprofen (Motrin), chills, shortness of breath, chest pain, persistent nausea and/or vomiting, numbness, weakness or tingling in the arms or legs or change in color of the extremities, changes in mental status, persistent headache, blurry vision.    Return within 8 - 12 hours if you have any of the following: worsening of pain in your abdomen, no food sounds good to you, you continue to vomit, pain goes to your back, have pain in the abdomen when going over a bump in the car or when you jump up and down, develop vaginal bleeding or discharge, inability to urinate, unable to follow up with your physician, or other any other care or concern.

## 2024-07-19 NOTE — ED PROVIDER NOTES
[facility]  Emergency Department Encounter  Emergency Medicine      Pt Name: Flaquita Pham  MRN:[id number]  Birthdate 1960  Date of evaluation: 7/19/24  PCP:  Yuri Coley DO    CHIEF COMPLAINT       Chief Complaint   Patient presents with    Abdominal Pain     Nausea        HISTORY OF PRESENT ILLNESS  (Location/Symptom, Timing/Onset, Context/Setting, Quality, Duration, ModifyingFactors, Severity.)      Flaquita Pham is a 64 y.o. female presents with lower abdominal pain and cramping as well as nausea.  Symptoms started about a week ago.  She was seen at another ED couple days ago, she said that had a CAT scan that showed diverticulosis.  She still has pain after going home.  Has had nausea no vomiting.  Said that she has abdominal cramping nonradiating mostly mid and left lower abdomen.  She says she feels better after having a bowel movement.  She has not had diarrhea, no bloody stools.  Denies any dysuria hematuria or vaginal complaints.  She has had a remote laparotomy which showed diverticulitis.  She had diverticulitis several times last took antibiotics for it May of this year.  She has no chest pain shortness of breath cough fever chills body aches, back pain.  Patient is not currently on antibiotics.    On chart review patient has numerous allergies to antibiotics    PAST MEDICAL / SURGICAL / SOCIAL /FAMILY HISTORY      has a past medical history of Cystocele, Diverticulosis, Hypercholesteremia, Lumbar radicular pain, Osteoarthritis of lumbosacral spine, Rotator cuff tear, Urinary incontinence, and Uterine prolapse.  No other pertinent PMH on review with patient/guardian.     has a past surgical history that includes hysteroscopy; Dilation and curettage of uterus; and Hysterectomy.  No other pertinent PSH on review with patient/guardian.  Social History     Socioeconomic History    Marital status: Single     Spouse name: Not on file    Number of children: Not on file    Years

## 2024-07-19 NOTE — ED NOTES
Patient is requesting something for pain at this time, will report to the provider  Patient states \"If he id not going to give me medicine for pain I can go home and take my own pain meds

## 2024-07-22 ENCOUNTER — TELEPHONE (OUTPATIENT)
Dept: GASTROENTEROLOGY | Age: 64
End: 2024-07-22

## 2024-07-22 NOTE — TELEPHONE ENCOUNTER
----- Message from Juani Sherman MD sent at 7/22/2024  3:00 PM EDT -----  Please schedule an office visit.  ----- Message -----  From: Bipin Dash DO  Sent: 7/20/2024   6:56 AM EDT  To: Juani Sherman MD

## 2024-07-23 ENCOUNTER — APPOINTMENT (OUTPATIENT)
Dept: SURGERY | Facility: CLINIC | Age: 64
End: 2024-07-23
Payer: COMMERCIAL

## 2024-07-23 ENCOUNTER — OFFICE VISIT (OUTPATIENT)
Dept: SURGERY | Facility: CLINIC | Age: 64
End: 2024-07-23
Payer: COMMERCIAL

## 2024-07-23 VITALS
DIASTOLIC BLOOD PRESSURE: 68 MMHG | WEIGHT: 142 LBS | HEIGHT: 63 IN | HEART RATE: 56 BPM | SYSTOLIC BLOOD PRESSURE: 120 MMHG | BODY MASS INDEX: 25.16 KG/M2

## 2024-07-23 DIAGNOSIS — R10.32 LEFT LOWER QUADRANT PAIN: Primary | ICD-10-CM

## 2024-07-23 PROCEDURE — 1159F MED LIST DOCD IN RCRD: CPT | Performed by: SURGERY

## 2024-07-23 PROCEDURE — 1036F TOBACCO NON-USER: CPT | Performed by: SURGERY

## 2024-07-23 PROCEDURE — 3008F BODY MASS INDEX DOCD: CPT | Performed by: SURGERY

## 2024-07-23 PROCEDURE — 99213 OFFICE O/P EST LOW 20 MIN: CPT | Performed by: SURGERY

## 2024-07-23 NOTE — PROGRESS NOTES
Subjective   Patient ID: Sarah Walker is a 64 y.o. female who presents for No chief complaint on file..  HPI  This patient 64-year-old female with multiple medical comorbidities including diverticular disease who presented to their medical hospital in early May with a small 1.4 cm intramural abscess.  She was treated with antibiotics and was discharged home.  She completed her antibiotic regimen and this repeat CT from May 15 does not show any residual disease.  Since that time she continues to have intermittent abdominal pain, mostly in her LLQ.  Unclear of temporality to any activities. Has tried a liquid diet but unsure if it helps.  Having bowel function but pain with defication.  She has had several scans, last being 7/12, which are negative for recurrent absences or diverticulitis.  She presents today for further workup and management.      Objective   Physical Exam  Gen: NAD, AAx3  Abd: Soft, ND, TTP in LLQ      CT abd/pel (7/12) - IMPRESSION:  No evidence of acute pulmonary embolism.      No evidence of bowel obstruction or acute appendicitis.      Colonic diverticulosis without evidence of acute diverticulitis.      No significant free abdominal or pelvic fluid.    Assessment/Plan   63 y/o female with multiple medical comorbidities with history of diverticulitis and pericolonic abscess that has resolved.  Explained to patient that I am unsure of what is causing her symptoms, but given her negative scans and bloodwork, the next step would be an upper and lower endoscopy.  Explained that by examining the lumen of the bowel I may be able to diagnose what is going on, kathrine. Something like a subclinical colitis.  If this ends up being a chronic diverticulitis, may need surgery, but will defer further discussion until sure that this is the diagnosis. Explained risks/benefits/alternatives and she agreed to this plan.  Did state she has an issue with transportation which she will work on in order to have it  done.  -Plan for EGD and colonoscpoy       Mitchell Walton MD 07/23/24 1:25 PM

## 2024-07-29 ENCOUNTER — APPOINTMENT (OUTPATIENT)
Dept: PRIMARY CARE | Facility: CLINIC | Age: 64
End: 2024-07-29
Payer: COMMERCIAL

## 2024-07-29 VITALS
TEMPERATURE: 97.1 F | DIASTOLIC BLOOD PRESSURE: 70 MMHG | BODY MASS INDEX: 25.69 KG/M2 | RESPIRATION RATE: 16 BRPM | OXYGEN SATURATION: 97 % | SYSTOLIC BLOOD PRESSURE: 110 MMHG | WEIGHT: 145 LBS | HEART RATE: 60 BPM

## 2024-07-29 DIAGNOSIS — Z12.31 ENCOUNTER FOR SCREENING MAMMOGRAM FOR MALIGNANT NEOPLASM OF BREAST: ICD-10-CM

## 2024-07-29 DIAGNOSIS — R11.0 NAUSEA: Primary | ICD-10-CM

## 2024-07-29 DIAGNOSIS — R10.9 ABDOMINAL CRAMPING: ICD-10-CM

## 2024-07-29 PROCEDURE — 99214 OFFICE O/P EST MOD 30 MIN: CPT | Performed by: INTERNAL MEDICINE

## 2024-07-29 PROCEDURE — 1160F RVW MEDS BY RX/DR IN RCRD: CPT | Performed by: INTERNAL MEDICINE

## 2024-07-29 PROCEDURE — 1159F MED LIST DOCD IN RCRD: CPT | Performed by: INTERNAL MEDICINE

## 2024-07-29 PROCEDURE — 1036F TOBACCO NON-USER: CPT | Performed by: INTERNAL MEDICINE

## 2024-07-29 RX ORDER — ONDANSETRON 4 MG/1
4 TABLET, FILM COATED ORAL EVERY 8 HOURS PRN
Qty: 20 TABLET | Refills: 1 | Status: SHIPPED | OUTPATIENT
Start: 2024-07-29 | End: 2024-08-05

## 2024-07-29 ASSESSMENT — ENCOUNTER SYMPTOMS
BLOOD IN STOOL: 0
NAUSEA: 1
BACK PAIN: 1
ABDOMINAL PAIN: 1
HEMATURIA: 0
ROS GI COMMENTS: POSITIVE FOR DARK STOOLS.

## 2024-07-29 NOTE — PROGRESS NOTES
Patient here for a 6 week follow up    Subjective   Patient ID: Sarah Walker is a 64 y.o. female who presents for Follow-up.    The patient mentions intermittent abdominal pain and nausea which resulted in multiple ER visits within the previous month.  During the last presentation on 7/12/2024, she completed a CT Abdomen/Pelvis with IV Contrast showed colonic diverticulosis without evidence of acute diverticulitis.  She was discharged home the same day with a prescription for dicyclomine.  The patient has since followed up with  from General Surgery who ordered a colonoscopy and endoscopy.      The patient states that the abdominal pain is ongoing.  She denies any hematochezia or hematuria.  The patient endorses one episode of non-tarry dark stools.  This has been associated with chills as well.  She has been taking ondansetron for nausea and finds this has been helping.      The patient reports ongoing flare-ups of lichen planus and a similar perioral rash.  This is also associated with dental issues which she is concerned about.  She continues to take cetirizine in the morning and Singulair in the evenings.  The patient is not taking Allegra or Claritin.     The patient mentions aggravation of previous sciatica.      The patient has a history of partial hysterectomy.  She has not followed up with Obstetrics/Gynecology for some time.      The patient reports significant life stress associated with difficult relationships with family members.       Review of Systems   HENT:  Positive for dental problem.    Gastrointestinal:  Positive for abdominal pain and nausea. Negative for blood in stool.        Positive for dark stools.    Genitourinary:  Negative for hematuria.   Musculoskeletal:  Positive for back pain.   Skin:  Positive for rash.       Objective   Physical Exam  Constitutional:       Appearance: Normal appearance.   Neck:      Vascular: No carotid bruit.   Cardiovascular:      Rate and Rhythm:  Normal rate and regular rhythm.      Heart sounds: Normal heart sounds.   Pulmonary:      Effort: Pulmonary effort is normal.      Breath sounds: Normal breath sounds.   Abdominal:      General: Bowel sounds are normal.      Palpations: Abdomen is soft.      Tenderness: There is no abdominal tenderness.   Skin:     General: Skin is warm and dry.   Neurological:      General: No focal deficit present.      Mental Status: She is alert and oriented to person, place, and time. Mental status is at baseline.   Psychiatric:         Mood and Affect: Mood normal.         Behavior: Behavior normal.       Assessment/Plan   Problem List Items Addressed This Visit             ICD-10-CM    Nausea - Primary R11.0    Relevant Medications    ondansetron (Zofran) 4 mg tablet     Other Visit Diagnoses         Codes    Encounter for screening mammogram for malignant neoplasm of breast     Z12.31    Relevant Orders    BI mammo bilateral screening tomosynthesis    Abdominal cramping     R10.9    Relevant Orders    Referral to Gastroenterology            IMPRESSIONS/PLAN :      Diverticulosis  - Pending orders for colonoscopy and endoscopy.  Discussed the procedures in some detail, and patient will schedule these soon.  Following with  from General Surgery.  Ordered referral to Gastroenterology.    Nausea  - Refilled ondansetron 4mg up to TID prn.      /70 in the office today.     HLD   - Stable per 9/2023.  Continue with simvastatin 40mg QD.  ASCVD 7.2% 2/2024.  CT Cardiac Score 253.4 per 1/2024.  Following with  from Cardiology.    Anxiety   - Stable.  Continue with hydroxyzine 25mg QD, this also help with chronic hives.     Anaphylaxis  - Refilled Epinephrine 0.3mg/0.3ml as 1 syringe as directed on packaging.  Advised to proceed to ER if she feels she needs to take this (or immediately after is she does need it).  Reviewed indications and criteria for usage with her.     Left low back pain   - Now follows with  Dr. Fuentes in Orthopedics. Xray of the hip performed on 06/2022 revealed Mild osteoarthritis bilateral hips and sacroiliac joints. She has started physical therapy. MRI of L-spine performed 05/2022 revealed Multilevel lumbar spondylopathy, worst at L3-4 and L4-5, with mild interval progression compared to prior examination dated 12/29/2017.     Asthma   - Uses ProAir  mcg/act 1-2 puffs every 4-6 hours as needed.     History of Tobacco Use/Lung Nodules   -She quit smoking 2 years ago.   Stable per 9/2023 repeat.     Lichen Planus  - She will discuss when she follows up with derm.     Recurrent Urticaria  - Has been a chronic issue but recently worsening.  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  No known triggers per patient. Advised patient to STOP Claritin and Zyrtec.  Prescribed fexofenadine 180mg QAM and Xyzal 5mg once nightly.  Can also increase Vitamin D from 1000 to 4000 units once daily.  Continue hydroxyzine 25 mg up to TID prn.  Previously on Prednisone and cetrizine have not been helping.  Instructed patient to go to the ED with recurrence of severe exacerbation or any lip swelling, and she verbalized agreement.  Encouraged patient to follow-up with  from Dermatology. Awaiting appointment with  from Allergy/Immunology.  Explained to patient from primary care standpoint unfortunately not much more we can offer as she has tried a multitude of medications (and this has been on / off since 2015).  Will need to see specialists at this point- patient agreed to keep upcoming appointments.      Hemorrhoids  - Recommended Tucks medicated pads OTC.  Colonoscopy UTD 10/2020- due 2025.    Left Shoulder Pain   - Recurrence.  Noted above.  Likely some arthritis / rotator cuff tendonitis.  Healing well after a fall, continue with icing for now.  Xray 12/2022  unremarkable.      Vitamin D Deficiency   - Takes Vitamin D 50mcg to be increased to 4000 units once daily.      Insomnia   - Previously tried melatonin, trazodone, and mirtazapine 15mg at night prn.     Health Maintenance   - Routine labs 9/2023. Last Mammogram 9/2023, ordered repeat for 2024. Last colonoscopy 10/2020, due for repeat 10/2025.  Lung screen CT UTD due 9/24.  Recommended patient follow-up with Dentistry.     Follow-up in 3 months, call sooner if needed.        Scribe Attestation  By signing my name below, I, Suzi Garcia   attest that this documentation has been prepared under the direction and in the presence of Panfilo Christina DO.   Sindi Kemp 07/29/24 2:28 PM

## 2024-10-14 ENCOUNTER — OFFICE VISIT (OUTPATIENT)
Dept: GASTROENTEROLOGY | Age: 64
End: 2024-10-14
Payer: COMMERCIAL

## 2024-10-14 VITALS — WEIGHT: 147 LBS | OXYGEN SATURATION: 98 % | BODY MASS INDEX: 26.05 KG/M2 | HEIGHT: 63 IN | HEART RATE: 62 BPM

## 2024-10-14 DIAGNOSIS — Z87.19 HISTORY OF DIVERTICULITIS: ICD-10-CM

## 2024-10-14 DIAGNOSIS — R11.0 NAUSEA: ICD-10-CM

## 2024-10-14 DIAGNOSIS — R10.32 LEFT LOWER QUADRANT ABDOMINAL PAIN: Primary | ICD-10-CM

## 2024-10-14 PROCEDURE — 99203 OFFICE O/P NEW LOW 30 MIN: CPT | Performed by: INTERNAL MEDICINE

## 2024-10-14 PROCEDURE — 4004F PT TOBACCO SCREEN RCVD TLK: CPT | Performed by: INTERNAL MEDICINE

## 2024-10-14 PROCEDURE — G8427 DOCREV CUR MEDS BY ELIG CLIN: HCPCS | Performed by: INTERNAL MEDICINE

## 2024-10-14 PROCEDURE — 1123F ACP DISCUSS/DSCN MKR DOCD: CPT | Performed by: INTERNAL MEDICINE

## 2024-10-14 PROCEDURE — G8419 CALC BMI OUT NRM PARAM NOF/U: HCPCS | Performed by: INTERNAL MEDICINE

## 2024-10-14 PROCEDURE — G8484 FLU IMMUNIZE NO ADMIN: HCPCS | Performed by: INTERNAL MEDICINE

## 2024-10-14 PROCEDURE — G8400 PT W/DXA NO RESULTS DOC: HCPCS | Performed by: INTERNAL MEDICINE

## 2024-10-14 PROCEDURE — 3017F COLORECTAL CA SCREEN DOC REV: CPT | Performed by: INTERNAL MEDICINE

## 2024-10-14 PROCEDURE — 1090F PRES/ABSN URINE INCON ASSESS: CPT | Performed by: INTERNAL MEDICINE

## 2024-10-14 RX ORDER — ALUMINUM ZIRCONIUM OCTACHLOROHYDREX GLY 16 G/100G
GEL TOPICAL
Qty: 425 G | Refills: 3 | Status: SHIPPED | OUTPATIENT
Start: 2024-10-14

## 2024-10-14 RX ORDER — FAMOTIDINE 20 MG/1
20 TABLET, FILM COATED ORAL 2 TIMES DAILY
Qty: 60 TABLET | Refills: 3 | Status: SHIPPED | OUTPATIENT
Start: 2024-10-14

## 2024-10-14 RX ORDER — DICYCLOMINE HYDROCHLORIDE 10 MG/1
10 CAPSULE ORAL 2 TIMES DAILY
Qty: 60 CAPSULE | Refills: 3 | Status: CANCELLED | OUTPATIENT
Start: 2024-10-14

## 2024-10-14 NOTE — PROGRESS NOTES
Gastroenterology Clinic Visit    Flaquita Pham  [id number]  Chief Complaint   Patient presents with    Follow-up     HPI: 64 y.o. female referred to the GI clinic with ongoing intermittent left-sided abdominal pain, history of diverticulitis and nausea.  Patient s/p treatment for diverticulitis which required IV antibiotics in May 2024.  Patient reports ongoing left-sided abdominal pain, denies any fevers or chills with it.  She does have slight decrease in frequency of bowel movements.  Not taking any fiber supplementation.  Has intermittent nausea.  Denies any hematochezia, melena or weight loss    Previous GI work up/Endoscopic investigations:   Colonoscopy: 10/28/2020: Findings: The terminal ileum appeared normal. Multiple small and large-mouthed diverticula were found in the      recto-sigmoid colon. There was narrowing of the colon in association with the diverticular opening     Review of Systems   All other systems reviewed and are negative.     Past Medical History:   Diagnosis Date    Cystocele     Diverticulosis     Hypercholesteremia     Lumbar radicular pain     Osteoarthritis of lumbosacral spine     Rotator cuff tear     Urinary incontinence     Uterine prolapse      Past Surgical History:   Procedure Laterality Date    DILATION AND CURETTAGE OF UTERUS      HYSTERECTOMY (CERVIX STATUS UNKNOWN)      HYSTEROSCOPY       Current Outpatient Medications on File Prior to Visit   Medication Sig Dispense Refill    ertapenem (INVANZ) 1 GM injection Inject 1,000 mg into the muscle every 24 hours      doxepin (SINEQUAN) 25 MG capsule Take 1 capsule by mouth nightly 30 capsule 3    ibuprofen (ADVIL;MOTRIN) 800 MG tablet Take 1 tablet by mouth 2 times daily as needed for Pain 30 tablet 0    BIOTIN PO Take 5,000 mcg by mouth daily      vitamin D (CHOLECALCIFEROL) 1000 units TABS tablet Take 2 tablets by mouth daily      hydrOXYzine (ATARAX) 25 MG tablet Take 1 tablet by mouth      cetirizine (ZYRTEC) 10 MG tablet  done

## 2024-10-30 ENCOUNTER — APPOINTMENT (OUTPATIENT)
Dept: PRIMARY CARE | Facility: CLINIC | Age: 64
End: 2024-10-30
Payer: COMMERCIAL

## 2024-12-02 NOTE — ED TRIAGE NOTES
Pt c/o abd pain with nausea. Pt states she has been seen many times without resolution. Pt is frustrated at this triage nurses questions. Pt is A&OX4, skin intact, afebrile, breaths are equal and unlabored.    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

## 2024-12-10 NOTE — PROGRESS NOTES
Gastroenterology Clinic Follow up Visit    Flaquita Pham  [id number]  Chief Complaint   Patient presents with    Follow-up       HPI: 64 y.o. female following up after last GI clinic on 10/14/2024     Interval change: Patient is followed to GI clinic with history of diverticulitis, intermittent left-sided abdominal pain,  and nausea.  Patient reports that she had been taking Metamucil for 1 week after last GI office visit.  She reports having 1 bowel movement daily which was formed to soft in consistency.  She stopped taking Metamucil, patient uncertain of why.  She does endorse having urgency with stools and wears a depends on a daily basis.  She has reported bowel movements occur 1-3 times daily and is associated with abdominal cramping which is relieved after bowel movement.  Patient denies any blood nor mucus in stool.  She reports improvement in acid reflux symptoms taking Pepcid twice daily, she does endorse increased acid with trigger foods including red sauces. Denies any unintentional weight loss, dysphagia, hematemesis, hematochezia, nor melena.  Patient stopped smoking 2 years  Patient denies any EtOH  Patient denies any NSAID use    HPI from last GI clinic visit on 10/14/2024 summarized below:  64 y.o. female referred to the GI clinic with ongoing intermittent left-sided abdominal pain, history of diverticulitis and nausea.  Patient s/p treatment for diverticulitis which required IV antibiotics in May 2024.  Patient reports ongoing left-sided abdominal pain, denies any fevers or chills with it.  She does have slight decrease in frequency of bowel movements.  Not taking any fiber supplementation.  Has intermittent nausea.  Denies any hematochezia, melena or weight loss     Previous GI work up/Endoscopic investigations:   Colonoscopy: 10/28/2020, Dr Cifuentes, [facility]  The terminal ileum appeared normal. Multiple small and large-mouthed diverticula were found in the recto-sigmoid colon. There

## 2024-12-11 ENCOUNTER — OFFICE VISIT (OUTPATIENT)
Dept: GASTROENTEROLOGY | Age: 64
End: 2024-12-11
Payer: COMMERCIAL

## 2024-12-11 VITALS
SYSTOLIC BLOOD PRESSURE: 112 MMHG | BODY MASS INDEX: 26.75 KG/M2 | OXYGEN SATURATION: 96 % | WEIGHT: 151 LBS | DIASTOLIC BLOOD PRESSURE: 60 MMHG | HEART RATE: 71 BPM

## 2024-12-11 DIAGNOSIS — R15.2 FECAL URGENCY: ICD-10-CM

## 2024-12-11 DIAGNOSIS — Z87.19 HISTORY OF DIVERTICULITIS: Primary | ICD-10-CM

## 2024-12-11 DIAGNOSIS — R10.32 LEFT LOWER QUADRANT ABDOMINAL PAIN: ICD-10-CM

## 2024-12-11 DIAGNOSIS — K21.9 GASTROESOPHAGEAL REFLUX DISEASE, UNSPECIFIED WHETHER ESOPHAGITIS PRESENT: ICD-10-CM

## 2024-12-11 DIAGNOSIS — K59.00 CONSTIPATION, UNSPECIFIED CONSTIPATION TYPE: ICD-10-CM

## 2024-12-11 PROCEDURE — G8484 FLU IMMUNIZE NO ADMIN: HCPCS | Performed by: NURSE PRACTITIONER

## 2024-12-11 PROCEDURE — 4004F PT TOBACCO SCREEN RCVD TLK: CPT | Performed by: NURSE PRACTITIONER

## 2024-12-11 PROCEDURE — G8400 PT W/DXA NO RESULTS DOC: HCPCS | Performed by: NURSE PRACTITIONER

## 2024-12-11 PROCEDURE — 1090F PRES/ABSN URINE INCON ASSESS: CPT | Performed by: NURSE PRACTITIONER

## 2024-12-11 PROCEDURE — 3017F COLORECTAL CA SCREEN DOC REV: CPT | Performed by: NURSE PRACTITIONER

## 2024-12-11 PROCEDURE — 1123F ACP DISCUSS/DSCN MKR DOCD: CPT | Performed by: NURSE PRACTITIONER

## 2024-12-11 PROCEDURE — G8427 DOCREV CUR MEDS BY ELIG CLIN: HCPCS | Performed by: NURSE PRACTITIONER

## 2024-12-11 PROCEDURE — G8419 CALC BMI OUT NRM PARAM NOF/U: HCPCS | Performed by: NURSE PRACTITIONER

## 2024-12-11 PROCEDURE — 99213 OFFICE O/P EST LOW 20 MIN: CPT | Performed by: NURSE PRACTITIONER

## 2024-12-12 ASSESSMENT — ENCOUNTER SYMPTOMS
DIARRHEA: 1
VOMITING: 0
ANAL BLEEDING: 0
NAUSEA: 0
TROUBLE SWALLOWING: 0
BLOOD IN STOOL: 0
CONSTIPATION: 0
ABDOMINAL DISTENTION: 0
ABDOMINAL PAIN: 0
RECTAL PAIN: 0

## 2025-01-30 DIAGNOSIS — E78.5 DYSLIPIDEMIA: ICD-10-CM

## 2025-01-30 RX ORDER — SIMVASTATIN 40 MG/1
40 TABLET, FILM COATED ORAL NIGHTLY
Qty: 90 TABLET | Refills: 3 | Status: SHIPPED | OUTPATIENT
Start: 2025-01-30

## 2025-01-30 NOTE — TELEPHONE ENCOUNTER
Pt scheduled follow up appointment for Feb 6 - would like to know if Dr Christina can send in a short script for Zocor - to [facility] in [place]

## 2025-02-06 ENCOUNTER — APPOINTMENT (OUTPATIENT)
Dept: PRIMARY CARE | Facility: CLINIC | Age: 65
End: 2025-02-06
Payer: COMMERCIAL

## 2025-02-18 ENCOUNTER — APPOINTMENT (OUTPATIENT)
Dept: PRIMARY CARE | Facility: CLINIC | Age: 65
End: 2025-02-18
Payer: COMMERCIAL

## 2025-03-05 ENCOUNTER — APPOINTMENT (OUTPATIENT)
Dept: PRIMARY CARE | Facility: CLINIC | Age: 65
End: 2025-03-05
Payer: COMMERCIAL

## 2025-03-05 VITALS
OXYGEN SATURATION: 98 % | DIASTOLIC BLOOD PRESSURE: 70 MMHG | SYSTOLIC BLOOD PRESSURE: 128 MMHG | WEIGHT: 158 LBS | BODY MASS INDEX: 27.99 KG/M2 | HEART RATE: 73 BPM | TEMPERATURE: 97 F | RESPIRATION RATE: 16 BRPM

## 2025-03-05 DIAGNOSIS — G89.29 CHRONIC LEFT SHOULDER PAIN: ICD-10-CM

## 2025-03-05 DIAGNOSIS — J44.9 CHRONIC OBSTRUCTIVE PULMONARY DISEASE, UNSPECIFIED COPD TYPE (MULTI): Primary | ICD-10-CM

## 2025-03-05 DIAGNOSIS — Z72.0 TOBACCO ABUSE: ICD-10-CM

## 2025-03-05 DIAGNOSIS — Z12.31 ENCOUNTER FOR SCREENING MAMMOGRAM FOR MALIGNANT NEOPLASM OF BREAST: ICD-10-CM

## 2025-03-05 DIAGNOSIS — M25.512 CHRONIC LEFT SHOULDER PAIN: ICD-10-CM

## 2025-03-05 DIAGNOSIS — R10.9 LEFT FLANK PAIN: ICD-10-CM

## 2025-03-05 DIAGNOSIS — F32.A DEPRESSION, UNSPECIFIED DEPRESSION TYPE: ICD-10-CM

## 2025-03-05 PROCEDURE — 1159F MED LIST DOCD IN RCRD: CPT | Performed by: INTERNAL MEDICINE

## 2025-03-05 PROCEDURE — 1160F RVW MEDS BY RX/DR IN RCRD: CPT | Performed by: INTERNAL MEDICINE

## 2025-03-05 PROCEDURE — 1036F TOBACCO NON-USER: CPT | Performed by: INTERNAL MEDICINE

## 2025-03-05 PROCEDURE — 1123F ACP DISCUSS/DSCN MKR DOCD: CPT | Performed by: INTERNAL MEDICINE

## 2025-03-05 PROCEDURE — 1158F ADVNC CARE PLAN TLK DOCD: CPT | Performed by: INTERNAL MEDICINE

## 2025-03-05 PROCEDURE — 99214 OFFICE O/P EST MOD 30 MIN: CPT | Performed by: INTERNAL MEDICINE

## 2025-03-05 RX ORDER — GUAIFENESIN 600 MG/1
600 TABLET, EXTENDED RELEASE ORAL 2 TIMES DAILY
Qty: 60 TABLET | Refills: 5 | Status: SHIPPED | OUTPATIENT
Start: 2025-03-05

## 2025-03-05 RX ORDER — DULOXETIN HYDROCHLORIDE 60 MG/1
60 CAPSULE, DELAYED RELEASE ORAL DAILY
Qty: 30 CAPSULE | Refills: 11 | Status: SHIPPED | OUTPATIENT
Start: 2025-03-05 | End: 2026-03-05

## 2025-03-05 ASSESSMENT — ENCOUNTER SYMPTOMS
NERVOUS/ANXIOUS: 1
DYSPHORIC MOOD: 1
ABDOMINAL PAIN: 1
FLANK PAIN: 1
CONSTIPATION: 1

## 2025-03-05 NOTE — PROGRESS NOTES
Patient here for a follow up    Subjective   Patient ID: Sarah Walker is a 65 y.o. female who presents for Follow-up.    The patient reports increased constipation and occasional colicky lower abdominal pain since stopping Metamucil supplementation one month prior.  She has been drinking prune juice, and finds that this is helping.  The patient is scheduled for an upcoming appointment with  from Gastroenterology for a history of diverticulosis.    The patient mentions a recent fall which resulted in pain on the left side of her body.  She suspects minimal injuries which are improving with time.  The patient mentions left upper extremity pain and chest wall pain which are precipitated by positional changes.      The patient reports intermittent left-sided flank pain.    The patient mentions increased anxiety and depressed mood related to concern about family issues.  Her daughter has not spoken to her for two years.  She continues to be close with her grandson who lives near her home as well as her Sister who lives locally.  The patient recalls trying duloxetine in the past, but is unsure whether this was effective.      Review of Systems   Gastrointestinal:  Positive for abdominal pain and constipation.   Genitourinary:  Positive for flank pain.   Musculoskeletal:         Positive for left-sided upper extremity and chest wall pain.   Psychiatric/Behavioral:  Positive for dysphoric mood. The patient is nervous/anxious.        Objective   Physical Exam  Constitutional:       Appearance: Normal appearance.   Neck:      Vascular: No carotid bruit.   Cardiovascular:      Rate and Rhythm: Normal rate and regular rhythm.      Heart sounds: Normal heart sounds.   Pulmonary:      Effort: Pulmonary effort is normal.      Breath sounds: Normal breath sounds.   Abdominal:      General: Bowel sounds are normal.      Palpations: Abdomen is soft.      Tenderness: There is no abdominal tenderness.   Skin:     General:  Skin is warm and dry.   Neurological:      General: No focal deficit present.      Mental Status: She is alert and oriented to person, place, and time. Mental status is at baseline.   Psychiatric:         Mood and Affect: Mood normal.         Behavior: Behavior normal.       Assessment/Plan   Problem List Items Addressed This Visit             ICD-10-CM    Depression F32.A    Relevant Medications    DULoxetine (Cymbalta) 60 mg DR capsule    Other Relevant Orders    Follow Up In [facility] Co[facility]    Chronic obstructive pulmonary disease (Multi) - Primary J44.9    Relevant Medications    Mucinex 600 mg 12 hr tablet     Other Visit Diagnoses         Codes    Encounter for screening mammogram for malignant neoplasm of breast     Z12.31    Relevant Orders    BI mammo bilateral screening tomosynthesis    Tobacco abuse     Z72.0    Relevant Orders    CT lung screening follow up CT chest wo IV contrast    Chronic left shoulder pain     M25.512, G89.29    Relevant Orders    Referral to Physical Therapy    Left flank pain     R10.9    Relevant Orders    US renal complete            IMPRESSIONS/PLAN :       History of Tobacco Use/Lung Nodules   -She quit smoking 2 years ago.   Stable per 9/2023 repeat.  Ordered repeat CT Lung Screening for 2025.    Depression  - Prescribed duloxetine 60mg once daily.  Explained that this may help with chronic pain as well.  Discussed adverse effect profile, and therapeutic expectations.  Ordered referral to [facility].    Asthma/COPD  - Prescribed Mucinex 600mg BID.  Uses ProAir  mcg/act 1-2 puffs every 4-6 hours as needed.  Call the clinic with any concerns.     Left-Sided Flank Pain  - Ordered Renal Ultrasound.    /70 in the office today.     HLD   - Stable per 9/2023.  Continue with simvastatin 40mg QD.  ASCVD 7.2% 2/2024.  CT Cardiac Score 253.4 per 1/2024.  Following with  from Cardiology.      Anxiety   - Stable.  Continue with hydroxyzine 25mg QD, this also help with chronic hives.     Insomnia   - Previously tried melatonin, trazodone, and mirtazapine 15mg at night prn.      Diverticulosis  - Previously pending orders for colonoscopy and endoscopy.  Discussed the procedures in some detail, and patient will schedule these soon.  Following with  from General Surgery.  Following with  from [facility].  Patient is scheduled for upcoming visit.    Hemorrhoids  - Recommended Tucks medicated pads OTC.  Colonoscopy UTD 10/2020- due 2025.    Left Shoulder Pain   - Recurrence.  Noted above.  Likely some arthritis / rotator cuff tendonitis.  Healing well after a fall, continue with icing for now.  Xray 12/2022 unremarkable.     Left low back pain   - Now follows with Dr. Fuentes in Orthopedics. Xray of the hip performed on 06/2022 revealed Mild osteoarthritis bilateral hips and sacroiliac joints. She has started physical therapy. MRI of L-spine performed 05/2022 revealed Multilevel lumbar spondylopathy, worst at L3-4 and L4-5, with mild interval progression compared to prior examination dated 12/29/2017.    Anaphylaxis  - Refilled Epinephrine 0.3mg/0.3ml as 1 syringe as directed on packaging.  Advised to proceed to ER if she feels she needs to take this (or immediately after is she does need it).  Reviewed indications and criteria for usage with her.    Lichen Planus  - She will discuss when she follows up with derm.     Recurrent Urticaria  - Significantly improved.  Maintained with Xolair injections.  Has been a chronic issue but recently worsening.  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  No known triggers per patient. Advised patient to STOP Claritin and Zyrtec.  Prescribed fexofenadine 180mg QAM and Xyzal 5mg once nightly.  Can also  increase Vitamin D from 1000 to 4000 units once daily.  Continue hydroxyzine 25 mg up to TID prn.  Previously on Prednisone and cetrizine have not been helping.  Instructed patient to go to the ED with recurrence of severe exacerbation or any lip swelling, and she verbalized agreement. Explained to patient from primary care standpoint unfortunately not much more we can offer as she has tried a multitude of medications (and this has been on / off since 2015).  Will need to see specialists at this point- patient agreed to keep upcoming appointments. Following with  from Allergy/Immunology.        Vitamin D Deficiency   - Takes Vitamin D 50mcg to be increased to 4000 units once daily.      Health Maintenance   - Routine labs 9/2023, 7/2024. Last Mammogram 9/2023, ordered repeat for 2025. Last colonoscopy 10/2020, due for repeat 10/2025.  Patient will discuss with  from [facility].  Recommended patient follow-up with Dentistry.     Follow-up in 3 months, call sooner if needed.        Scribe Attestation  By signing my name below, I, Sindi Kemp, Suzi   attest that this documentation has been prepared under the direction and in the presence of Panfilo Christina DO.   Sindi Kemp 03/05/25 1:55 PM

## 2025-03-10 NOTE — PROGRESS NOTES
Gastroenterology Clinic Follow up Visit    Flaquita NORTON Ankit  [id number]  Chief Complaint   Patient presents with    Follow-up       HPI: 65 y.o. female following up after last GI clinic on 12/11/2024     Interval change: Patient is follow-up to GI clinic with ongoing intermittent left lower quadrant pain.  Patient does have history of diverticulitis.  She does endorse that she has increased fiber taking Metamucil daily.  Patient is also on Xolair injections for idiopathic Urticaria.  Patient had 2-day episode of vomiting and diarrhea.  She attributes this to spoiled food as her refrigerator was freezing or throwing food on a daily basis.  Patient had fall injuring left back arm and leg.  She has been seen by PCP.  Patient endorses having increased stress and depression over the last several months.  She was prescribed Cymbalta per PCP however has not started medication.  Denies any unintentional weight loss, dysphagia, hematemesis, hematochezia, nor melena.    HPI from last GI clinic visit on 12/11/2024 summarized below:  64 y.o. female referred to the GI clinic with ongoing intermittent left-sided abdominal pain, history of diverticulitis and nausea.  Patient s/p treatment for diverticulitis which required IV antibiotics in May 2024.  Patient reports ongoing left-sided abdominal pain, denies any fevers or chills with it.  She does have slight decrease in frequency of bowel movements.  Not taking any fiber supplementation.  Has intermittent nausea.  Denies any hematochezia, melena or weight loss   HPI from last GI clinic visit on 10/14/2024 summarized below:  64 y.o. female referred to the GI clinic with ongoing intermittent left-sided abdominal pain, history of diverticulitis and nausea.  Patient s/p treatment for diverticulitis which required IV antibiotics in May 2024.  Patient reports ongoing left-sided abdominal pain, denies any fevers or chills with it.  She does have slight decrease in frequency of bowel

## 2025-03-11 ENCOUNTER — OFFICE VISIT (OUTPATIENT)
Dept: GASTROENTEROLOGY | Age: 65
End: 2025-03-11
Payer: COMMERCIAL

## 2025-03-11 VITALS — WEIGHT: 154 LBS | HEART RATE: 65 BPM | BODY MASS INDEX: 27.29 KG/M2 | HEIGHT: 63 IN | OXYGEN SATURATION: 98 %

## 2025-03-11 DIAGNOSIS — R10.32 LEFT LOWER QUADRANT ABDOMINAL PAIN: Primary | ICD-10-CM

## 2025-03-11 DIAGNOSIS — K21.9 GASTROESOPHAGEAL REFLUX DISEASE, UNSPECIFIED WHETHER ESOPHAGITIS PRESENT: ICD-10-CM

## 2025-03-11 DIAGNOSIS — Z87.19 HISTORY OF DIVERTICULITIS: ICD-10-CM

## 2025-03-11 PROCEDURE — G8419 CALC BMI OUT NRM PARAM NOF/U: HCPCS | Performed by: NURSE PRACTITIONER

## 2025-03-11 PROCEDURE — G8427 DOCREV CUR MEDS BY ELIG CLIN: HCPCS | Performed by: NURSE PRACTITIONER

## 2025-03-11 PROCEDURE — G8400 PT W/DXA NO RESULTS DOC: HCPCS | Performed by: NURSE PRACTITIONER

## 2025-03-11 PROCEDURE — 1090F PRES/ABSN URINE INCON ASSESS: CPT | Performed by: NURSE PRACTITIONER

## 2025-03-11 PROCEDURE — 99213 OFFICE O/P EST LOW 20 MIN: CPT | Performed by: NURSE PRACTITIONER

## 2025-03-11 PROCEDURE — 1123F ACP DISCUSS/DSCN MKR DOCD: CPT | Performed by: NURSE PRACTITIONER

## 2025-03-11 PROCEDURE — 4004F PT TOBACCO SCREEN RCVD TLK: CPT | Performed by: NURSE PRACTITIONER

## 2025-03-11 PROCEDURE — 3017F COLORECTAL CA SCREEN DOC REV: CPT | Performed by: NURSE PRACTITIONER

## 2025-03-11 ASSESSMENT — ENCOUNTER SYMPTOMS
RECTAL PAIN: 0
NAUSEA: 0
ABDOMINAL DISTENTION: 0
ABDOMINAL PAIN: 1
BLOOD IN STOOL: 0
TROUBLE SWALLOWING: 0
DIARRHEA: 0
ANAL BLEEDING: 0
CONSTIPATION: 0
VOMITING: 0

## 2025-03-13 ENCOUNTER — TRANSCRIBE ORDERS (OUTPATIENT)
Dept: ADMINISTRATIVE | Age: 65
End: 2025-03-13

## 2025-03-13 DIAGNOSIS — R10.9 LEFT FLANK PAIN: Primary | ICD-10-CM

## 2025-04-22 ENCOUNTER — APPOINTMENT (OUTPATIENT)
Dept: PRIMARY CARE | Facility: CLINIC | Age: 65
End: 2025-04-22
Payer: COMMERCIAL

## 2025-04-23 ENCOUNTER — HOSPITAL ENCOUNTER (OUTPATIENT)
Dept: ULTRASOUND IMAGING | Age: 65
Discharge: HOME | End: 2025-04-25
Payer: COMMERCIAL

## 2025-04-23 ENCOUNTER — TELEPHONE (OUTPATIENT)
Dept: PRIMARY CARE | Facility: CLINIC | Age: 65
End: 2025-04-23
Payer: COMMERCIAL

## 2025-04-23 DIAGNOSIS — R10.9 LEFT FLANK PAIN: ICD-10-CM

## 2025-04-23 PROCEDURE — 76770 US EXAM ABDO BACK WALL COMP: CPT

## 2025-04-24 ENCOUNTER — HOSPITAL ENCOUNTER (OUTPATIENT)
Dept: ULTRASOUND IMAGING | Age: 65
Discharge: HOME | End: 2025-04-26
Payer: COMMERCIAL

## 2025-04-24 DIAGNOSIS — R10.9 LEFT FLANK PAIN: ICD-10-CM

## 2025-04-24 PROCEDURE — 51798 US URINE CAPACITY MEASURE: CPT

## 2025-06-16 ENCOUNTER — APPOINTMENT (OUTPATIENT)
Dept: RADIOLOGY | Facility: CLINIC | Age: 65
End: 2025-06-16
Payer: COMMERCIAL

## 2025-08-13 ENCOUNTER — APPOINTMENT (OUTPATIENT)
Dept: RADIOLOGY | Facility: CLINIC | Age: 65
End: 2025-08-13
Payer: COMMERCIAL

## 2025-08-18 ENCOUNTER — HOSPITAL ENCOUNTER (OUTPATIENT)
Dept: RADIOLOGY | Facility: CLINIC | Age: 65
Discharge: HOME | End: 2025-08-18
Payer: COMMERCIAL

## 2025-08-18 ENCOUNTER — APPOINTMENT (OUTPATIENT)
Dept: RADIOLOGY | Facility: CLINIC | Age: 65
End: 2025-08-18
Payer: COMMERCIAL

## 2025-08-18 ENCOUNTER — TELEPHONE (OUTPATIENT)
Dept: PRIMARY CARE | Facility: CLINIC | Age: 65
End: 2025-08-18
Payer: COMMERCIAL

## 2025-08-18 DIAGNOSIS — Z72.0 TOBACCO ABUSE: ICD-10-CM

## 2025-08-18 PROCEDURE — 71271 CT THORAX LUNG CANCER SCR C-: CPT | Performed by: RADIOLOGY

## 2025-08-18 PROCEDURE — 71271 CT THORAX LUNG CANCER SCR C-: CPT

## 2025-08-27 ENCOUNTER — APPOINTMENT (OUTPATIENT)
Dept: PRIMARY CARE | Facility: CLINIC | Age: 65
End: 2025-08-27
Payer: COMMERCIAL

## 2025-09-04 ENCOUNTER — APPOINTMENT (OUTPATIENT)
Dept: PRIMARY CARE | Facility: CLINIC | Age: 65
End: 2025-09-04
Payer: COMMERCIAL

## 2025-09-17 ENCOUNTER — APPOINTMENT (OUTPATIENT)
Dept: PRIMARY CARE | Facility: CLINIC | Age: 65
End: 2025-09-17
Payer: COMMERCIAL